# Patient Record
Sex: FEMALE | Race: BLACK OR AFRICAN AMERICAN | Employment: OTHER | URBAN - METROPOLITAN AREA
[De-identification: names, ages, dates, MRNs, and addresses within clinical notes are randomized per-mention and may not be internally consistent; named-entity substitution may affect disease eponyms.]

---

## 2018-01-03 ENCOUNTER — HOSPITAL ENCOUNTER (OUTPATIENT)
Dept: PREADMISSION TESTING | Age: 58
Discharge: HOME | End: 2018-01-03
Payer: MEDICARE

## 2018-01-03 LAB
ANION GAP SERPL CALC-SCNC: 9 MMOL/L (ref 3–18)
APTT PPP: 30.9 SEC (ref 23–36.4)
ATRIAL RATE: 65 BPM
BACTERIA SPEC CULT: NORMAL
BUN SERPL-MCNC: 20 MG/DL (ref 7–18)
BUN/CREAT SERPL: 19 (ref 12–20)
CALCIUM SERPL-MCNC: 9.3 MG/DL (ref 8.5–10.1)
CALCULATED P AXIS, ECG09: 5 DEGREES
CALCULATED R AXIS, ECG10: 11 DEGREES
CALCULATED T AXIS, ECG11: 107 DEGREES
CHLORIDE SERPL-SCNC: 104 MMOL/L (ref 100–108)
CO2 SERPL-SCNC: 28 MMOL/L (ref 21–32)
CREAT SERPL-MCNC: 1.08 MG/DL (ref 0.6–1.3)
DIAGNOSIS, 93000: NORMAL
ERYTHROCYTE [DISTWIDTH] IN BLOOD BY AUTOMATED COUNT: 13.9 % (ref 11.6–14.5)
GLUCOSE SERPL-MCNC: 91 MG/DL (ref 74–99)
HCT VFR BLD AUTO: 37 % (ref 35–45)
HGB BLD-MCNC: 12.1 G/DL (ref 12–16)
INR PPP: 1 (ref 0.8–1.2)
MCH RBC QN AUTO: 32 PG (ref 24–34)
MCHC RBC AUTO-ENTMCNC: 32.7 G/DL (ref 31–37)
MCV RBC AUTO: 97.9 FL (ref 74–97)
P-R INTERVAL, ECG05: 192 MS
PLATELET # BLD AUTO: 310 K/UL (ref 135–420)
PMV BLD AUTO: 9.6 FL (ref 9.2–11.8)
POTASSIUM SERPL-SCNC: 3.9 MMOL/L (ref 3.5–5.5)
PROTHROMBIN TIME: 12.5 SEC (ref 11.5–15.2)
Q-T INTERVAL, ECG07: 426 MS
QRS DURATION, ECG06: 76 MS
QTC CALCULATION (BEZET), ECG08: 443 MS
RBC # BLD AUTO: 3.78 M/UL (ref 4.2–5.3)
SERVICE CMNT-IMP: NORMAL
SODIUM SERPL-SCNC: 141 MMOL/L (ref 136–145)
VENTRICULAR RATE, ECG03: 65 BPM
WBC # BLD AUTO: 7.6 K/UL (ref 4.6–13.2)

## 2018-01-03 PROCEDURE — 85730 THROMBOPLASTIN TIME PARTIAL: CPT | Performed by: ORTHOPAEDIC SURGERY

## 2018-01-03 PROCEDURE — 93005 ELECTROCARDIOGRAM TRACING: CPT

## 2018-01-03 PROCEDURE — 80048 BASIC METABOLIC PNL TOTAL CA: CPT | Performed by: ORTHOPAEDIC SURGERY

## 2018-01-03 PROCEDURE — 87641 MR-STAPH DNA AMP PROBE: CPT | Performed by: ORTHOPAEDIC SURGERY

## 2018-01-03 PROCEDURE — 85610 PROTHROMBIN TIME: CPT | Performed by: ORTHOPAEDIC SURGERY

## 2018-01-03 PROCEDURE — 85027 COMPLETE CBC AUTOMATED: CPT | Performed by: ORTHOPAEDIC SURGERY

## 2018-01-03 RX ORDER — ALBUTEROL SULFATE 0.83 MG/ML
SOLUTION RESPIRATORY (INHALATION)
COMMUNITY

## 2018-01-03 RX ORDER — FLUTICASONE PROPIONATE 50 MCG
2 SPRAY, SUSPENSION (ML) NASAL 2 TIMES DAILY
COMMUNITY

## 2018-01-03 RX ORDER — TRAMADOL HYDROCHLORIDE 50 MG/1
50 TABLET ORAL
COMMUNITY
End: 2020-08-11

## 2018-01-03 RX ORDER — NAPROXEN 500 MG/1
500 TABLET ORAL
COMMUNITY
End: 2020-08-11

## 2018-01-03 RX ORDER — FUROSEMIDE 40 MG/1
40 TABLET ORAL EVERY OTHER DAY
COMMUNITY

## 2018-01-03 RX ORDER — SIMVASTATIN 40 MG/1
40 TABLET, FILM COATED ORAL
COMMUNITY
End: 2019-06-03

## 2018-01-03 RX ORDER — RANITIDINE 150 MG/1
150 TABLET, FILM COATED ORAL 2 TIMES DAILY
COMMUNITY
End: 2020-08-11

## 2018-01-03 RX ORDER — DICLOFENAC POTASSIUM 50 MG/1
50 TABLET, FILM COATED ORAL 3 TIMES DAILY
COMMUNITY
End: 2019-06-03

## 2018-01-03 RX ORDER — FLUCONAZOLE 100 MG/1
100 TABLET ORAL DAILY
COMMUNITY
End: 2019-06-03

## 2018-01-03 RX ORDER — PHENTERMINE HYDROCHLORIDE 37.5 MG/1
37.5 CAPSULE ORAL
COMMUNITY
End: 2018-03-14

## 2018-01-03 RX ORDER — PREGABALIN 300 MG/1
300 CAPSULE ORAL 2 TIMES DAILY
COMMUNITY
End: 2020-08-11

## 2018-01-03 RX ORDER — CEFAZOLIN SODIUM 2 G/50ML
2 SOLUTION INTRAVENOUS ONCE
Status: CANCELLED | OUTPATIENT
Start: 2018-01-03 | End: 2018-01-03

## 2018-01-03 RX ORDER — AMLODIPINE BESYLATE 5 MG/1
5 TABLET ORAL DAILY
COMMUNITY
End: 2019-06-03

## 2018-01-03 RX ORDER — QUETIAPINE FUMARATE 50 MG/1
50 TABLET, FILM COATED ORAL
COMMUNITY
End: 2019-06-03

## 2018-01-03 RX ORDER — SODIUM CHLORIDE, SODIUM LACTATE, POTASSIUM CHLORIDE, CALCIUM CHLORIDE 600; 310; 30; 20 MG/100ML; MG/100ML; MG/100ML; MG/100ML
125 INJECTION, SOLUTION INTRAVENOUS CONTINUOUS
Status: CANCELLED | OUTPATIENT
Start: 2018-01-03

## 2018-01-03 NOTE — PERIOP NOTES
No sleep apnea or previous sleep studies. No history of MH. PCP is aware of surgery. Care fusion instructions reviewed and kit given. Does  meet criteria for special population at this time, OR posting notified. Not participating in clinical trials or research study.

## 2018-03-14 RX ORDER — DICLOFENAC SODIUM 50 MG/1
TABLET, DELAYED RELEASE ORAL 2 TIMES DAILY
COMMUNITY
End: 2019-06-03

## 2018-03-14 RX ORDER — GUAIFENESIN 100 MG/5ML
81 LIQUID (ML) ORAL DAILY
COMMUNITY
End: 2020-08-25

## 2018-03-15 ENCOUNTER — HOSPITAL ENCOUNTER (INPATIENT)
Dept: CARDIAC CATH/INVASIVE PROCEDURES | Age: 58
LOS: 1 days | Discharge: ANOTHER HOSPITAL | DRG: 287 | End: 2018-03-16
Attending: INTERNAL MEDICINE | Admitting: INTERNAL MEDICINE
Payer: MEDICARE

## 2018-03-15 PROBLEM — Z95.1 AORTOCORONARY BYPASS STATUS: Status: ACTIVE | Noted: 2018-03-15

## 2018-03-15 LAB
ANION GAP SERPL CALC-SCNC: 6 MMOL/L (ref 3–18)
BUN SERPL-MCNC: 27 MG/DL (ref 7–18)
BUN/CREAT SERPL: 23 (ref 12–20)
CALCIUM SERPL-MCNC: 9.3 MG/DL (ref 8.5–10.1)
CHLORIDE SERPL-SCNC: 104 MMOL/L (ref 100–108)
CHOLEST SERPL-MCNC: 195 MG/DL
CO2 SERPL-SCNC: 28 MMOL/L (ref 21–32)
CREAT SERPL-MCNC: 1.17 MG/DL (ref 0.6–1.3)
ERYTHROCYTE [DISTWIDTH] IN BLOOD BY AUTOMATED COUNT: 13.8 % (ref 11.6–14.5)
GLUCOSE SERPL-MCNC: 93 MG/DL (ref 74–99)
HCT VFR BLD AUTO: 43.5 % (ref 35–45)
HDLC SERPL-MCNC: 42 MG/DL (ref 40–60)
HDLC SERPL: 4.6 {RATIO} (ref 0–5)
HGB BLD-MCNC: 14.3 G/DL (ref 12–16)
INR PPP: 0.9 (ref 0.8–1.2)
LDLC SERPL CALC-MCNC: 128.6 MG/DL (ref 0–100)
LIPID PROFILE,FLP: ABNORMAL
MAGNESIUM SERPL-MCNC: 1.9 MG/DL (ref 1.6–2.6)
MCH RBC QN AUTO: 32.1 PG (ref 24–34)
MCHC RBC AUTO-ENTMCNC: 32.9 G/DL (ref 31–37)
MCV RBC AUTO: 97.5 FL (ref 74–97)
PLATELET # BLD AUTO: 281 K/UL (ref 135–420)
PMV BLD AUTO: 9.2 FL (ref 9.2–11.8)
POTASSIUM SERPL-SCNC: 4.2 MMOL/L (ref 3.5–5.5)
PROTHROMBIN TIME: 12 SEC (ref 11.5–15.2)
RBC # BLD AUTO: 4.46 M/UL (ref 4.2–5.3)
SODIUM SERPL-SCNC: 138 MMOL/L (ref 136–145)
TRIGL SERPL-MCNC: 122 MG/DL (ref ?–150)
VLDLC SERPL CALC-MCNC: 24.4 MG/DL
WBC # BLD AUTO: 5.7 K/UL (ref 4.6–13.2)

## 2018-03-15 PROCEDURE — 74011250637 HC RX REV CODE- 250/637: Performed by: INTERNAL MEDICINE

## 2018-03-15 PROCEDURE — 74011000250 HC RX REV CODE- 250

## 2018-03-15 PROCEDURE — B2111ZZ FLUOROSCOPY OF MULTIPLE CORONARY ARTERIES USING LOW OSMOLAR CONTRAST: ICD-10-PCS | Performed by: INTERNAL MEDICINE

## 2018-03-15 PROCEDURE — 74011636320 HC RX REV CODE- 636/320: Performed by: INTERNAL MEDICINE

## 2018-03-15 PROCEDURE — 80061 LIPID PANEL: CPT | Performed by: INTERNAL MEDICINE

## 2018-03-15 PROCEDURE — 85610 PROTHROMBIN TIME: CPT | Performed by: INTERNAL MEDICINE

## 2018-03-15 PROCEDURE — 80048 BASIC METABOLIC PNL TOTAL CA: CPT | Performed by: INTERNAL MEDICINE

## 2018-03-15 PROCEDURE — 77030010221 CARDIAC CATHETERIZATION

## 2018-03-15 PROCEDURE — 4A023N8 MEASUREMENT OF CARDIAC SAMPLING AND PRESSURE, BILATERAL, PERCUTANEOUS APPROACH: ICD-10-PCS | Performed by: INTERNAL MEDICINE

## 2018-03-15 PROCEDURE — 65660000000 HC RM CCU STEPDOWN

## 2018-03-15 PROCEDURE — 74011000250 HC RX REV CODE- 250: Performed by: INTERNAL MEDICINE

## 2018-03-15 PROCEDURE — 83735 ASSAY OF MAGNESIUM: CPT | Performed by: INTERNAL MEDICINE

## 2018-03-15 PROCEDURE — B2151ZZ FLUOROSCOPY OF LEFT HEART USING LOW OSMOLAR CONTRAST: ICD-10-PCS | Performed by: INTERNAL MEDICINE

## 2018-03-15 PROCEDURE — 74011250636 HC RX REV CODE- 250/636

## 2018-03-15 PROCEDURE — 74011250636 HC RX REV CODE- 250/636: Performed by: INTERNAL MEDICINE

## 2018-03-15 PROCEDURE — 85027 COMPLETE CBC AUTOMATED: CPT | Performed by: INTERNAL MEDICINE

## 2018-03-15 PROCEDURE — 36415 COLL VENOUS BLD VENIPUNCTURE: CPT | Performed by: INTERNAL MEDICINE

## 2018-03-15 RX ORDER — CLONAZEPAM 0.5 MG/1
0.5 TABLET ORAL 3 TIMES DAILY
Status: DISCONTINUED | OUTPATIENT
Start: 2018-03-15 | End: 2018-03-16 | Stop reason: HOSPADM

## 2018-03-15 RX ORDER — RANITIDINE 150 MG/1
150 TABLET, FILM COATED ORAL 2 TIMES DAILY
Status: DISCONTINUED | OUTPATIENT
Start: 2018-03-15 | End: 2018-03-16 | Stop reason: HOSPADM

## 2018-03-15 RX ORDER — LOSARTAN POTASSIUM 50 MG/1
100 TABLET ORAL DAILY
Status: DISCONTINUED | OUTPATIENT
Start: 2018-03-16 | End: 2018-03-16 | Stop reason: HOSPADM

## 2018-03-15 RX ORDER — OXYCODONE HYDROCHLORIDE 10 MG/1
TABLET ORAL
COMMUNITY
End: 2020-08-11

## 2018-03-15 RX ORDER — FENTANYL CITRATE 50 UG/ML
INJECTION, SOLUTION INTRAMUSCULAR; INTRAVENOUS
Status: COMPLETED
Start: 2018-03-15 | End: 2018-03-15

## 2018-03-15 RX ORDER — SODIUM CHLORIDE 0.9 % (FLUSH) 0.9 %
5-10 SYRINGE (ML) INJECTION EVERY 8 HOURS
Status: DISCONTINUED | OUTPATIENT
Start: 2018-03-15 | End: 2018-03-16 | Stop reason: HOSPADM

## 2018-03-15 RX ORDER — LIDOCAINE HYDROCHLORIDE 10 MG/ML
INJECTION, SOLUTION EPIDURAL; INFILTRATION; INTRACAUDAL; PERINEURAL
Status: COMPLETED
Start: 2018-03-15 | End: 2018-03-15

## 2018-03-15 RX ORDER — ADENOSINE 3 MG/ML
140 INJECTION, SOLUTION INTRAVENOUS
Status: DISCONTINUED | OUTPATIENT
Start: 2018-03-15 | End: 2018-03-15 | Stop reason: HOSPADM

## 2018-03-15 RX ORDER — LIDOCAINE HYDROCHLORIDE 10 MG/ML
3-20 INJECTION INFILTRATION; PERINEURAL
Status: DISCONTINUED | OUTPATIENT
Start: 2018-03-15 | End: 2018-03-15 | Stop reason: HOSPADM

## 2018-03-15 RX ORDER — LORAZEPAM 1 MG/1
.5-1 TABLET ORAL
Status: DISCONTINUED | OUTPATIENT
Start: 2018-03-15 | End: 2018-03-16 | Stop reason: HOSPADM

## 2018-03-15 RX ORDER — SODIUM CHLORIDE 9 MG/ML
50 INJECTION, SOLUTION INTRAVENOUS CONTINUOUS
Status: DISCONTINUED | OUTPATIENT
Start: 2018-03-15 | End: 2018-03-16

## 2018-03-15 RX ORDER — FLUTICASONE PROPIONATE 50 MCG
2 SPRAY, SUSPENSION (ML) NASAL 2 TIMES DAILY
Status: DISCONTINUED | OUTPATIENT
Start: 2018-03-15 | End: 2018-03-16 | Stop reason: HOSPADM

## 2018-03-15 RX ORDER — VERAPAMIL HYDROCHLORIDE 2.5 MG/ML
INJECTION, SOLUTION INTRAVENOUS
Status: DISPENSED
Start: 2018-03-15 | End: 2018-03-15

## 2018-03-15 RX ORDER — ARIPIPRAZOLE 2 MG/1
2 TABLET ORAL
Status: DISCONTINUED | OUTPATIENT
Start: 2018-03-15 | End: 2018-03-16 | Stop reason: HOSPADM

## 2018-03-15 RX ORDER — GUAIFENESIN 100 MG/5ML
81 LIQUID (ML) ORAL DAILY
Status: COMPLETED | OUTPATIENT
Start: 2018-03-15 | End: 2018-03-15

## 2018-03-15 RX ORDER — MIDAZOLAM HYDROCHLORIDE 1 MG/ML
INJECTION, SOLUTION INTRAMUSCULAR; INTRAVENOUS
Status: DISPENSED
Start: 2018-03-15 | End: 2018-03-15

## 2018-03-15 RX ORDER — AMLODIPINE BESYLATE 5 MG/1
5 TABLET ORAL DAILY
Status: DISCONTINUED | OUTPATIENT
Start: 2018-03-16 | End: 2018-03-16 | Stop reason: HOSPADM

## 2018-03-15 RX ORDER — ALBUTEROL SULFATE 0.83 MG/ML
2.5 SOLUTION RESPIRATORY (INHALATION)
Status: DISCONTINUED | OUTPATIENT
Start: 2018-03-15 | End: 2018-03-16 | Stop reason: HOSPADM

## 2018-03-15 RX ORDER — LIDOCAINE HYDROCHLORIDE 10 MG/ML
3-20 INJECTION, SOLUTION EPIDURAL; INFILTRATION; INTRACAUDAL; PERINEURAL ONCE
Status: COMPLETED | OUTPATIENT
Start: 2018-03-15 | End: 2018-03-15

## 2018-03-15 RX ORDER — PHENTERMINE HYDROCHLORIDE 37.5 MG/1
37.5 CAPSULE ORAL 2 TIMES DAILY
COMMUNITY
End: 2019-06-03

## 2018-03-15 RX ORDER — HEPARIN SODIUM 200 [USP'U]/100ML
INJECTION, SOLUTION INTRAVENOUS
Status: COMPLETED
Start: 2018-03-15 | End: 2018-03-15

## 2018-03-15 RX ORDER — SODIUM CHLORIDE 900 MG/100ML
INJECTION INTRAVENOUS
Status: DISPENSED
Start: 2018-03-15 | End: 2018-03-15

## 2018-03-15 RX ORDER — METOPROLOL TARTRATE 50 MG/1
50 TABLET ORAL 2 TIMES DAILY
Status: DISCONTINUED | OUTPATIENT
Start: 2018-03-15 | End: 2018-03-16 | Stop reason: HOSPADM

## 2018-03-15 RX ORDER — FUROSEMIDE 40 MG/1
40 TABLET ORAL DAILY
Status: DISCONTINUED | OUTPATIENT
Start: 2018-03-15 | End: 2018-03-16 | Stop reason: HOSPADM

## 2018-03-15 RX ORDER — SIMVASTATIN 40 MG/1
40 TABLET, FILM COATED ORAL
Status: DISCONTINUED | OUTPATIENT
Start: 2018-03-15 | End: 2018-03-16 | Stop reason: HOSPADM

## 2018-03-15 RX ORDER — GUAIFENESIN 100 MG/5ML
81 LIQUID (ML) ORAL DAILY
Status: DISCONTINUED | OUTPATIENT
Start: 2018-03-15 | End: 2018-03-16 | Stop reason: HOSPADM

## 2018-03-15 RX ORDER — QUETIAPINE FUMARATE 25 MG/1
50 TABLET, FILM COATED ORAL
Status: DISCONTINUED | OUTPATIENT
Start: 2018-03-15 | End: 2018-03-16 | Stop reason: HOSPADM

## 2018-03-15 RX ORDER — NITROGLYCERIN 5 MG/ML
INJECTION, SOLUTION INTRAVENOUS
Status: DISPENSED
Start: 2018-03-15 | End: 2018-03-15

## 2018-03-15 RX ORDER — SODIUM CHLORIDE 0.9 % (FLUSH) 0.9 %
5-10 SYRINGE (ML) INJECTION AS NEEDED
Status: DISCONTINUED | OUTPATIENT
Start: 2018-03-15 | End: 2018-03-16 | Stop reason: HOSPADM

## 2018-03-15 RX ORDER — HEPARIN SODIUM 1000 [USP'U]/ML
INJECTION, SOLUTION INTRAVENOUS; SUBCUTANEOUS
Status: DISPENSED
Start: 2018-03-15 | End: 2018-03-15

## 2018-03-15 RX ORDER — FENTANYL CITRATE 50 UG/ML
25-100 INJECTION, SOLUTION INTRAMUSCULAR; INTRAVENOUS
Status: DISCONTINUED | OUTPATIENT
Start: 2018-03-15 | End: 2018-03-15 | Stop reason: HOSPADM

## 2018-03-15 RX ORDER — MIDAZOLAM HYDROCHLORIDE 1 MG/ML
.5-2 INJECTION, SOLUTION INTRAMUSCULAR; INTRAVENOUS
Status: DISCONTINUED | OUTPATIENT
Start: 2018-03-15 | End: 2018-03-15 | Stop reason: HOSPADM

## 2018-03-15 RX ORDER — BIVALIRUDIN 250 MG/5ML
INJECTION, POWDER, LYOPHILIZED, FOR SOLUTION INTRAVENOUS
Status: DISPENSED
Start: 2018-03-15 | End: 2018-03-15

## 2018-03-15 RX ORDER — PREGABALIN 100 MG/1
300 CAPSULE ORAL 2 TIMES DAILY
Status: DISCONTINUED | OUTPATIENT
Start: 2018-03-15 | End: 2018-03-16 | Stop reason: HOSPADM

## 2018-03-15 RX ORDER — GABAPENTIN 300 MG/1
300 CAPSULE ORAL 2 TIMES DAILY
Status: DISCONTINUED | OUTPATIENT
Start: 2018-03-15 | End: 2018-03-16 | Stop reason: HOSPADM

## 2018-03-15 RX ORDER — HEPARIN SODIUM 1000 [USP'U]/ML
4000 INJECTION, SOLUTION INTRAVENOUS; SUBCUTANEOUS ONCE
Status: COMPLETED | OUTPATIENT
Start: 2018-03-15 | End: 2018-03-15

## 2018-03-15 RX ORDER — HEPARIN SODIUM 200 [USP'U]/100ML
500 INJECTION, SOLUTION INTRAVENOUS
Status: DISCONTINUED | OUTPATIENT
Start: 2018-03-15 | End: 2018-03-15 | Stop reason: HOSPADM

## 2018-03-15 RX ADMIN — QUETIAPINE FUMARATE 50 MG: 25 TABLET ORAL at 22:13

## 2018-03-15 RX ADMIN — FENTANYL CITRATE 50 MCG: 50 INJECTION, SOLUTION INTRAMUSCULAR; INTRAVENOUS at 10:19

## 2018-03-15 RX ADMIN — LAMOTRIGINE 150 MG: 100 TABLET ORAL at 23:35

## 2018-03-15 RX ADMIN — SODIUM CHLORIDE 50 ML/HR: 900 INJECTION, SOLUTION INTRAVENOUS at 17:30

## 2018-03-15 RX ADMIN — SODIUM CHLORIDE 50 ML/HR: 900 INJECTION, SOLUTION INTRAVENOUS at 08:28

## 2018-03-15 RX ADMIN — SIMVASTATIN 40 MG: 40 TABLET, FILM COATED ORAL at 22:13

## 2018-03-15 RX ADMIN — GABAPENTIN 300 MG: 300 CAPSULE ORAL at 22:13

## 2018-03-15 RX ADMIN — MIDAZOLAM HYDROCHLORIDE 1 MG: 1 INJECTION, SOLUTION INTRAMUSCULAR; INTRAVENOUS at 10:38

## 2018-03-15 RX ADMIN — VERAPAMIL HYDROCHLORIDE 3 ML: 2.5 INJECTION INTRAVENOUS at 10:09

## 2018-03-15 RX ADMIN — LIDOCAINE HYDROCHLORIDE 3 ML: 10 INJECTION, SOLUTION EPIDURAL; INFILTRATION; INTRACAUDAL; PERINEURAL at 09:58

## 2018-03-15 RX ADMIN — LIDOCAINE HYDROCHLORIDE 3 ML: 10 INJECTION, SOLUTION EPIDURAL; INFILTRATION; INTRACAUDAL; PERINEURAL at 09:59

## 2018-03-15 RX ADMIN — HEPARIN SODIUM 4000 UNITS: 1000 INJECTION, SOLUTION INTRAVENOUS; SUBCUTANEOUS at 10:11

## 2018-03-15 RX ADMIN — FENTANYL CITRATE 100 MCG: 50 INJECTION, SOLUTION INTRAMUSCULAR; INTRAVENOUS at 09:58

## 2018-03-15 RX ADMIN — Medication 1000 UNITS: at 10:22

## 2018-03-15 RX ADMIN — MIDAZOLAM HYDROCHLORIDE 1 MG: 1 INJECTION, SOLUTION INTRAMUSCULAR; INTRAVENOUS at 10:20

## 2018-03-15 RX ADMIN — MIDAZOLAM HYDROCHLORIDE 2 MG: 1 INJECTION, SOLUTION INTRAMUSCULAR; INTRAVENOUS at 09:58

## 2018-03-15 RX ADMIN — METOPROLOL TARTRATE 50 MG: 50 TABLET ORAL at 22:13

## 2018-03-15 RX ADMIN — ASPIRIN 81 MG 81 MG: 81 TABLET ORAL at 08:27

## 2018-03-15 RX ADMIN — FLUTICASONE PROPIONATE 2 SPRAY: 50 SPRAY, METERED NASAL at 22:12

## 2018-03-15 RX ADMIN — Medication 1000 UNITS: at 09:58

## 2018-03-15 RX ADMIN — PREGABALIN 300 MG: 100 CAPSULE ORAL at 22:13

## 2018-03-15 RX ADMIN — CLONAZEPAM 0.5 MG: 0.5 TABLET ORAL at 22:13

## 2018-03-15 RX ADMIN — CLONAZEPAM 0.5 MG: 0.5 TABLET ORAL at 18:03

## 2018-03-15 RX ADMIN — ARIPIPRAZOLE 2 MG: 2 TABLET ORAL at 22:13

## 2018-03-15 RX ADMIN — IOPAMIDOL 85 ML: 510 INJECTION, SOLUTION INTRAVASCULAR at 10:38

## 2018-03-15 RX ADMIN — FENTANYL CITRATE 50 MCG: 50 INJECTION, SOLUTION INTRAMUSCULAR; INTRAVENOUS at 10:38

## 2018-03-15 RX ADMIN — RANITIDINE 150 MG: 150 TABLET ORAL at 22:12

## 2018-03-15 NOTE — PROGRESS NOTES
Venous sheath from right anticub area removed, easton pressure to site, covered with 2x2 and tegaderm  Tr band removed, no drainage noted from site, 2x2 and tegaderm applied, neuro/vasc assessment of right wirist and hand WNL

## 2018-03-15 NOTE — ROUTINE PROCESS
Cardiac Cath Lab:  Pre Procedure Chart Check     Patients chart was accessed and reviewed for possible and/or scheduled procedure. Creatinine Clearance:  Creatinine clearance cannot be calculated (Unknown ideal weight.)    Total Contrast  Load:  3 x estimated clearance amount=  ml    75% of Contrast Load:  0.75 x Total Contrast Load=    ml    No results for input(s): WBC, RBC, HCT, HGB, PLT, INR, APTT, PTP, NA, K, BUN, CREA, GFRAA, GFRNA, CA, MAG, CPK, CKMB, CKNDX, CKND1, TROPT, TROIQ, BNPP, BNP, HCTEXT, HGBEXT, PLTEXT in the last 72 hours. No lab exists for component: DDIMER, GLUCOSE, INREXT    BMI: There is no height or weight on file to calculate BMI.     ALLERGIES:   Allergies   Allergen Reactions    Hydrocodone Nausea and Vomiting       Lines:                History:    Past Medical History:   Diagnosis Date    Anemia     Arthritis     back    Asthma     life long    Autoimmune disease ([facility] 2013    Fibromyalgia    Bipolar 1 disorder, depressed ([facility]     Bronchitis     Chronic obstructive pulmonary disease ([facility]     2017    Chronic pain     back, all over    Depression     HTN (hypertension), benign     30 years    Hypertension     Liver disease     states had hepatitis and was treated after childbirth and a bloof transfusion 20 years ago    PTSD (post-traumatic stress disorder)      Past Surgical History:   Procedure Laterality Date    ABDOMEN SURGERY PROC UNLISTED      [facility]    BREAST SURGERY PROCEDURE UNLISTED      reduction    DELIVERY       ENDOMETRIAL ABLATION, THERMAL      GASTRIC BYPASS,OBESITY,W/SM BOWEL RECONS      HX APPENDECTOMY      years ago     [address]    HX CHOLECYSTECTOMY      years ago    HX GASTRIC BYPASS  2007    HX HERNIA REPAIR  2008    HX HYSTERECTOMY      years ago    HX LUMBAR DISKECTOMY  2006     Patient Active Problem List   Diagnosis Code    HTN (hypertension), benign I10    Depression F32.9    Asthma J45.909    Bipolar 1 disorder, depressed (HCC) F31.9    PTSD (post-traumatic stress disorder) F43.10    Back pain M54.9

## 2018-03-15 NOTE — ROUTINE PROCESS
1300 TRANSFER - OUT REPORT:    Verbal report given to Adrienne PEOPLES(name) on Aziza Sterling  being transferred to [facility] Cardiac (unit) for routine progression of care       Report consisted of patients Situation, Background, Assessment and   Recommendations(SBAR). Information from the following report(s) SBAR, Kardex, Procedure Summary, Intake/Output, MAR, Recent Results, Med Rec Status and Cardiac Rhythm SR was reviewed with the receiving nurse. Lines:   Peripheral IV 03/15/18 Left Antecubital (Active)   Site Assessment Clean, dry, & intact 3/15/2018  8:34 AM   Phlebitis Assessment 0 3/15/2018  8:34 AM   Infiltration Assessment 0 3/15/2018  8:34 AM   Dressing Status Clean, dry, & intact 3/15/2018  8:34 AM   Dressing Type Tape;Transparent 3/15/2018  8:34 AM   Hub Color/Line Status Pink;Flushed;Capped; Infusing 3/15/2018  8:34 AM   Action Taken Open ports on tubing capped 3/15/2018  8:34 AM   Alcohol Cap Used Yes 3/15/2018  8:34 AM        Opportunity for questions and clarification was provided.       Patient transported with:   Monitor

## 2018-03-15 NOTE — PROGRESS NOTES
Pt is all prepped and ready for procedure. 1045 Pt back to unit S/P cardiac cath. Rt wrist and rt brachial accessed. TR band to wrist and venous sheat in place to rt brachial. Pt very sleepy. Sites wnl.     1230 Pt awake and alert, up to bathroom and voided well  [id number] now eating lunch  Spoke with Ynes Castellanos from transfer center, transfer work  Initiated, face sheet faxed. 1300 Report called to tele    1350 Pt transfer to [address] [place] [address] , bed 345 in stable condition.

## 2018-03-15 NOTE — PROGRESS NOTES
Cardiology Progress Note        Patient: Ernestina Ferrell        Sex: female          DOA: 3/15/2018  YOB: 1960      Age:  63 y.o.        LOS:  LOS: 0 days   Assessment/Plan     Discussed with transfer center, they want me to discuss with ER physician for transfer to [facility]. Discussed with patient and son they requested transfer to [facility]  for CABG. Will initiate process of transfer to [facility].   THX      Signed By: Noé Kaur MD     March 15, 2018

## 2018-03-15 NOTE — ROUTINE PROCESS
-1400 Assumed pt care. Received verbal report from [address], Cath lab. No s/s of distress noted. Pt to be transferred to [facility] for a CABG. Waiting on call from [facility] for room assignment. - Dr Carlito Mckenzie stated pt will be transferred to [facility] tomorrow. Bedside and Verbal shift change report given to Jose Daniel High RN (oncoming nurse) by Sade Harvey RN (offgoing nurse). Report included the following information SBAR, Kardex, MAR and Accordion. Alarm parameters reviewed, on and audible Appropriate for patient clinical condition. Alarm parameters reviewed and opportunities for questions provided.

## 2018-03-15 NOTE — PROGRESS NOTES
TRANSFER - OUT REPORT:  Verbal report given to RICHELLE Granados(name) on Brenton Sevilla being transferred to Care(unit) for routine progression of care   Report consisted of patients Situation, Background, Assessment and   Recommendations(SBAR). Information from the following report(s) SBAR, Kardex, Procedure Summary, MAR, Recent Results and Cardiac Rhythm NSR 69bpm was reviewed with the receiving nurse. Cath Lab Report:    Procedure:  [x ] LHC  [x ] RHC  [ ] PTCA   [ ] Peripheral   [ ] Pacemaker [ ] TORI  [ ] [facility]    Access site:   [x ] Right [ ] Left  [ ] Radial [x ] Brachial [ ] Femoral [ ] Jugular [ ] Chest Wall      Sheath:           [ x] Pulled in Cath Lab   [x ] In place in RBV   [x ] To be pulled after: 1 hour after heparin @1111         Closure:          [ x] Radial Band  [ ] Manual Pressure     [ ] Radha Reyes     [ ] Star Close    [ ] Per Close    [ ] Safe Guard    Site Assessment:   [x ] Clean, Dry, No bleeding    [ ] Minor oozing          [ ] Hematoma: Description:    Stents(s) Placement:  [ ] Left Main:                 [ ] LAD:                [ ] Circ:                [ ] RCA:                [ ] EF:     [ ] Peripheral:      [ ] N/A    Infusion [ ]Angiomax [ ] Integrelin [ ] Heparin d/c'd: Intra procedure Medications:    Fentanyl:200mcg IV  Versed:4mg IV  Heparin:4000Units @ 1011  Antiplatelet:  Other:    Lines:        Peripheral IV 03/15/18 Left Antecubital (Active)   Site Assessment Clean, dry, & intact 3/15/2018  8:34 AM   Phlebitis Assessment 0 3/15/2018  8:34 AM   Infiltration Assessment 0 3/15/2018  8:34 AM   Dressing Status Clean, dry, & intact 3/15/2018  8:34 AM   Dressing Type Tape;Transparent 3/15/2018  8:34 AM   Hub Color/Line Status Pink;Flushed;Capped; Infusing 3/15/2018  8:34 AM   Action Taken Open ports on tubing capped 3/15/2018  8:34 AM   Alcohol Cap Used Yes 3/15/2018  8:34 AM                                         Sheath 03/15/18 (Active)   Site Assessment Clean, dry, & intact 3/15/2018 10:31 AM   Dressing Status Clean, dry, & intact 3/15/2018 10:31 AM   Dressing Type Transparent 3/15/2018 10:31 AM   Hub Color/Line Status Green 3/15/2018 10:31 AM   Hemostasis  Dressing applied during procedure 3/15/2018 10:31 AM       Patient Vitals for the past 4 hrs:   Temp Pulse Resp BP SpO2   03/15/18 1031 97.8 °F (36.6 °C) 69 13 (!) 150/97 96 %   03/15/18 0830 99 °F (37.2 °C) - 20 105/68 95 %         Extended / Orthostatic Vitals:    Vital Signs  Level of Consciousness: Alert (03/15/18 1031)  Temp: 97.8 °F (36.6 °C) (03/15/18 1031)  Temp Source: Oral (03/15/18 1031)  Pulse (Heart Rate): 69 (03/15/18 1031)  Heart Rate Source: Monitor (03/15/18 1031)  Cardiac Rhythm: Normal sinus rhythm (03/15/18 1031)  Resp Rate: 13 (03/15/18 1031)  BP: (!) 150/97 (03/15/18 1031)  MAP (Calculated): 115 (03/15/18 1031)  BP 1 Location: Left arm (03/15/18 1031)  BP 1 Method: Automatic (03/15/18 1031)  BP Patient Position: At rest;Supine (03/15/18 1031)  MEWS Score: 0 (03/15/18 1031)         Oxygen Therapy  O2 Sat (%): 96 % (03/15/18 1031)  O2 Device: Room air (03/15/18 0830)          Opportunity for questions and clarification was provided.

## 2018-03-16 VITALS
SYSTOLIC BLOOD PRESSURE: 138 MMHG | RESPIRATION RATE: 16 BRPM | DIASTOLIC BLOOD PRESSURE: 72 MMHG | WEIGHT: 227.07 LBS | OXYGEN SATURATION: 98 % | BODY MASS INDEX: 38.77 KG/M2 | HEART RATE: 70 BPM | HEIGHT: 64 IN | TEMPERATURE: 97.7 F

## 2018-03-16 PROBLEM — I10 ESSENTIAL HYPERTENSION: Status: ACTIVE | Noted: 2018-03-16

## 2018-03-16 PROBLEM — I25.10 3-VESSEL CAD: Status: ACTIVE | Noted: 2018-03-15

## 2018-03-16 PROBLEM — E66.01 MORBID OBESITY (HCC): Status: ACTIVE | Noted: 2018-03-15

## 2018-03-16 PROBLEM — E78.5 HYPERLIPIDEMIA: Status: ACTIVE | Noted: 2018-03-15

## 2018-03-16 LAB
APTT PPP: 28.1 SEC (ref 23–36.4)
BASOPHILS # BLD: 0 K/UL (ref 0–0.06)
BASOPHILS NFR BLD: 0 % (ref 0–2)
DIFFERENTIAL METHOD BLD: ABNORMAL
EOSINOPHIL # BLD: 0.2 K/UL (ref 0–0.4)
EOSINOPHIL NFR BLD: 4 % (ref 0–5)
ERYTHROCYTE [DISTWIDTH] IN BLOOD BY AUTOMATED COUNT: 14 % (ref 11.6–14.5)
HCT VFR BLD AUTO: 41.6 % (ref 35–45)
HGB BLD-MCNC: 13.4 G/DL (ref 12–16)
LYMPHOCYTES # BLD: 1.8 K/UL (ref 0.9–3.6)
LYMPHOCYTES NFR BLD: 33 % (ref 21–52)
MCH RBC QN AUTO: 31.6 PG (ref 24–34)
MCHC RBC AUTO-ENTMCNC: 32.2 G/DL (ref 31–37)
MCV RBC AUTO: 98.1 FL (ref 74–97)
MONOCYTES # BLD: 0.4 K/UL (ref 0.05–1.2)
MONOCYTES NFR BLD: 8 % (ref 3–10)
NEUTS SEG # BLD: 3.1 K/UL (ref 1.8–8)
NEUTS SEG NFR BLD: 55 % (ref 40–73)
PLATELET # BLD AUTO: 287 K/UL (ref 135–420)
PMV BLD AUTO: 9.3 FL (ref 9.2–11.8)
RBC # BLD AUTO: 4.24 M/UL (ref 4.2–5.3)
WBC # BLD AUTO: 5.6 K/UL (ref 4.6–13.2)

## 2018-03-16 PROCEDURE — 36415 COLL VENOUS BLD VENIPUNCTURE: CPT | Performed by: INTERNAL MEDICINE

## 2018-03-16 PROCEDURE — 74011000250 HC RX REV CODE- 250: Performed by: INTERNAL MEDICINE

## 2018-03-16 PROCEDURE — 74011250637 HC RX REV CODE- 250/637: Performed by: INTERNAL MEDICINE

## 2018-03-16 PROCEDURE — 85025 COMPLETE CBC W/AUTO DIFF WBC: CPT | Performed by: INTERNAL MEDICINE

## 2018-03-16 PROCEDURE — 85730 THROMBOPLASTIN TIME PARTIAL: CPT | Performed by: INTERNAL MEDICINE

## 2018-03-16 PROCEDURE — 74011250636 HC RX REV CODE- 250/636: Performed by: INTERNAL MEDICINE

## 2018-03-16 RX ORDER — HEPARIN SODIUM 10000 [USP'U]/100ML
9.8-25 INJECTION, SOLUTION INTRAVENOUS
Status: DISCONTINUED | OUTPATIENT
Start: 2018-03-16 | End: 2018-03-16 | Stop reason: HOSPADM

## 2018-03-16 RX ADMIN — ASPIRIN 81 MG 81 MG: 81 TABLET ORAL at 10:46

## 2018-03-16 RX ADMIN — LAMOTRIGINE 150 MG: 100 TABLET ORAL at 10:46

## 2018-03-16 RX ADMIN — RANITIDINE 150 MG: 150 TABLET ORAL at 10:46

## 2018-03-16 RX ADMIN — HEPARIN SODIUM AND DEXTROSE 9.8 UNITS/KG/HR: 10000; 5 INJECTION INTRAVENOUS at 15:42

## 2018-03-16 RX ADMIN — FUROSEMIDE 40 MG: 40 TABLET ORAL at 10:46

## 2018-03-16 RX ADMIN — METOPROLOL TARTRATE 50 MG: 50 TABLET ORAL at 10:52

## 2018-03-16 RX ADMIN — UMECLIDINIUM BROMIDE AND VILANTEROL TRIFENATATE 1 PUFF: 62.5; 25 POWDER RESPIRATORY (INHALATION) at 10:51

## 2018-03-16 RX ADMIN — FLUTICASONE PROPIONATE 2 SPRAY: 50 SPRAY, METERED NASAL at 10:51

## 2018-03-16 RX ADMIN — Medication 10 ML: at 15:36

## 2018-03-16 RX ADMIN — AMLODIPINE BESYLATE 5 MG: 5 TABLET ORAL at 10:46

## 2018-03-16 RX ADMIN — LOSARTAN POTASSIUM 100 MG: 50 TABLET ORAL at 10:46

## 2018-03-16 NOTE — PROCEDURES
[address] [facility] [facility]    Name:Filomena POLLACK.  MR#: [id number]  : 1960  ACCOUNT #: [de-identified]   DATE OF SERVICE: 03/15/2018    REASON FOR PROCEDURE:  Preoperative abnormal cardiac stress test, with a history of shortness of breath and abnormal EKG. PROCEDURES PERFORMED:  1. Left heart catheterization. 2.  Right heart catheterization. 3.  Selective coronary angiogram.    BLOOD LOSS:  Less than 50 mL. SPECIMENS REMOVED:  None. COUNSELING:  Risks, benefits and alternatives were discussed in detail with the patient and family including mother and son. They all agreed to proceed. PROCEDURE AND TECHNIQUE:  The patient was brought to the cardiac catheterization lab in a fasting and nonsedated state. The patient was prepped and draped in the usual sterilized fashion. The right radial area was anesthetized with local anesthetic and then the right antecubital vein area was also anesthetized with local anesthetic. A 6-Australian sheath was placed under fluoroscopic guidance in the right antecubital vein. Then right radial artery access was also achieved via modified Seldinger technique under fluoroscopic guidance without any complication. The patient was given a radial cocktail and then the Creston catheter was advanced under fluoroscopic guidance. Right-sided pressures were measured and right-sided cardiac output and cardiac index were also measured. The patient remained hemodynamically stable after completing the right heart catheterization. Then a 5-Australian JR4 catheter was used to perform the left heart catheterization and a thin 5-Australian JR4 catheter was used to perform the selective angiography of the right pulmonary artery. Then a 5-Australian JL3 diagnostic catheter was used to perform the selective angiography of the left coronary system. Multiple angiographic views were acquired. The patient remained hemodynamically stable.     Procedure was completed without any complication. The patient remained hemodynamically stable. FINDINGS:  CORONARY ANATOMY:  1. Left main:  This is a right dominant coronary anatomy. LVEDP is 43 mmHg. 2.  Left main artery is a very large vessel in its ostium and body, distally bifurcated into LAD and left circumflex artery, distal left main has probably 30-40% disease on cranial views. 3.  LAD:  LAD is a large vessel in its ostium and in the proximal area, and then it gives rise to a large diagonal branch which has ostial 70-80% lesion, and then in the mid area the LAD is a subtotally occluded very small vessel. Distal LAD is filling with antegrade filling with a small-caliber vessel bridging to the apex. 4.  Left circumflex artery is a large vessel in its ostium and proximal area, gives rise to a small OM1. The moderate-size OM2 has a 70% lesion, and distal left circumflex artery also has a 70% lesion in the distal area, which further divides into superior and inferior branches distally. 5.  Right coronary artery is a moderate-size vessel, has 30-40% proximal lesion. In the mid RCA there is an 80-90% lesion, and very distal RCA also has 80-90% disease. Distal RCA is patent, which bifurcates into small PLV and PDA branches with minimal luminal irregularity. HEMODYNAMICS:    1. Aortic saturation was 88.3% on room air and PA saturation was 60.60% on room air. 2.  Pulmonary capillary wedge pressure mean pressure was 27 mmHg. A wave was 28 mmHg, V wave was 30 mmHg. 3.  PA pressure:  PA was 44/27 mmHg, mean pressure was 31 mmHg. 4.  RV:  54/13. 5.  RA mean pressure was 15 mmHg, A wave was 19 mmHg and V wave was 16 mmHg. 6.  LV was 162/21 with mean LVEDP of 43 mmHg. Aortic pressure was 166/92 mmHg. 7.  PVR was 0.91 Hidalgo unit. 8.  Thermodilution cardiac output and cardiac index was 4.65 liters per minute and 2.25 L per minute per square meter.   9.  Sheng cardiac output and cardiac index was 4.41 and 2.13 L per minute per square meter.    FINAL IMPRESSION:  1.  Multivessel coronary artery disease. 2.  Distal left main has 30-40% disease on cranial view. 3.  Mid-left anterior descending is subtotally occluded and long segment of subtotally occluded vessel. 3.  Moderate to large diagonal branch has ostial 70-80% disease. 4.  Mid-distal left circumflex artery has 70% disease. 5.  Large obtuse marginal 2 branch has 70% proximal disease. 6.  Mid-right coronary artery has 80-90% tight lesions, 2 of them. 7.  Right-sided elevated pressure. No evidence of any significant pulmonary hypertension. RECOMMENDATION:  Multivessel bypass surgery. We will discuss with the family. Depending upon the family's wishes, we will transfer the patient to the hospital , where the patient will undergo bypass surgery. The patient will be admitted in the hospital, will be started on heparin. Discussed with the patient's son and mother.       Rosa Ivey MD MA / GILMA  D: 03/15/2018 20:04     T: 03/16/2018 04:17  JOB #: [id number]

## 2018-03-16 NOTE — PROGRESS NOTES
Cm was asked by Dr. Ronnie Valle to get intouch with cardiac surgeon at [facility] patient has mulitvessel disease ad needs a cabg. Spoke with transfer center [phone] they will have thoracic cardiac surgeon call him back [phone]. Also provided her with Dr. Erika Dupree pager number    Met with patiet at bedside states she understands the purpose for her transfer. Patient is agreeable. Patient states shelives with her henry nd will call him and let him know she is being transferred. Telephone call from [place]amps from  Transfer center from [facility]. Pedro Reed is the accepting physician. She asked that patients face sheet be faxed to [phone]. Cm did fax and have confirmation with job no. [phone]. They will accept patient to the telemetry unit  At [facility] however they will call when they have a bed available. Telephone call from [facility] They can accept the patient today she will need ALs transport [place] [facility] 10th [facility] East room 334A. RN please call report to [phone] at [address] is setting up transportation at this time   Patient will need Emtala form it has been signed  By Dr. Ronnie Valle and form given to [facility], Devendra B [address] Management Interventions  PCP Verified by CM: Yes  Mode of Transport at Discharge: ALS  Transition of Care Consult (CM Consult): Other ([facility])  Current Support Network:  Other  Confirm Follow Up Transport:  (als to [employer])  Plan discussed with Pt/Family/Caregiver: Yes  Discharge Location  Discharge Placement: Other: ([facility]

## 2018-03-16 NOTE — PROGRESS NOTES
Cardiology Progress Note        Patient: Brenton Sevilla        Sex: female          DOA: 3/15/2018  YOB: 1960      Age:  63 y.o.        LOS:  LOS: 1 day   Assessment/Plan     Principal Problem:    3-vessel CAD (3/15/2018)    Active Problems:    Essential hypertension (3/16/2018)      Hyperlipidemia (3/15/2018)      Morbid obesity ([address] (3/15/2018)        Plan:  Severe multivessel CAD  Discussed with cardiac surgeon Dr. Wilber Scott, who have accepted the patient for CABG at [facility]  Patient have CD of angiography  EMTALA is done  DC summary dictated. Subjective:    cc:  No CP    REVIEW OF SYSTEMS:     General: No fevers or chills. Cardiovascular: No chest pain or pressure. No palpitations. Pre syncope, syncope. No ankle swelling  Pulmonary: No SOB, orthopnea, PND  Gastrointestinal: No nausea, vomiting or diarrhea      Objective:      Visit Vitals    /72 (BP 1 Location: Right arm, BP Patient Position: At rest;Supine; Head of bed elevated (Comment degrees))    Pulse 70    Temp 97.7 °F (36.5 °C)    Resp 16    Ht 5' 4\" (1.626 m)    Wt 103 kg (227 lb 1.2 oz)    SpO2 98%    Breastfeeding No    BMI 38.98 kg/m2     Body mass index is 38.98 kg/(m^2). Physical Exam:  General Appearance: Comfortable, not using accessory muscles of respiration. HEENT: AMIRAH. HEAD: Atraumatic  NECK: No JVD, no thyroidomeglay. LUNGS: Clear bilaterally. , no added sounds   HEART: S1+S2 audible, no added sound    ABD: Non-tender, BS Audible    EXT: No edema, and no cysnosis. VASCULAR EXAM: Pulses are intact. PSYCHIATRIC EXAM: Mood is appropriate. MUSCULOSKELETAL: Grossly no joint deformity. NEUROLOGICAL: No motor and sensory deficit, Cranial nerves II-XII intact.   Medication:  Current Facility-Administered Medications   Medication Dose Route Frequency    0.9% sodium chloride infusion  50 mL/hr IntraVENous CONTINUOUS    LORazepam (ATIVAN) tablet 0.5-1 mg  0.5-1 mg Oral ON CALL    bivalirudin (ANGIOMAX) 250 mg in 0.9% sodium chloride (MBP/ADV) 50 mL infusion  1.75 mg/kg/hr IntraVENous CONTINUOUS    sodium chloride (NS) flush 5-10 mL  5-10 mL IntraVENous Q8H    sodium chloride (NS) flush 5-10 mL  5-10 mL IntraVENous PRN    aspirin chewable tablet 81 mg  81 mg Oral DAILY    umeclidinium-vilanterol (ANORO ELLIPTA) 62.5 mcg- 25 mcg/inhalation  1 Puff Inhalation DAILY    albuterol (PROVENTIL VENTOLIN) nebulizer solution 2.5 mg  2.5 mg Nebulization Q4H PRN    amLODIPine (NORVASC) tablet 5 mg  5 mg Oral DAILY    fluticasone (FLONASE) 50 mcg/actuation nasal spray 2 Spray  2 Spray Both Nostrils BID    furosemide (LASIX) tablet 40 mg  40 mg Oral DAILY    pregabalin (LYRICA) capsule 300 mg  300 mg Oral BID    QUEtiapine (SEROquel) tablet 50 mg  50 mg Oral QHS    raNITIdine (ZANTAC) tablet 150 mg  150 mg Oral BID    simvastatin (ZOCOR) tablet 40 mg  40 mg Oral QHS    ARIPiprazole (ABILIFY) tablet 2 mg  2 mg Oral QHS    clonazePAM (KlonoPIN) tablet 0.5 mg  0.5 mg Oral TID    lamoTRIgine (LaMICtal) tablet 150 mg  150 mg Oral BID    losartan (COZAAR) tablet 100 mg  100 mg Oral DAILY    metoprolol tartrate (LOPRESSOR) tablet 50 mg  50 mg Oral BID    gabapentin (NEURONTIN) capsule 300 mg  300 mg Oral BID    levomilnacipran (FETZIMA) ER capsule 40 mg  (Patient Supplied)  40 mg Oral BID               Lab/Data Reviewed:  Procedures/imaging: see electronic medical records for all procedures/Xrays   and details which were not copied into this note but were reviewed prior to creation of Plan       All lab results for the last 24 hours reviewed.      Recent Labs      03/15/18   0815   WBC  5.7   HGB  14.3   HCT  43.5   PLT  281     Recent Labs      03/15/18   0815   NA  138   K  4.2   CL  104   CO2  28   GLU  93   BUN  27*   CREA  1.17   CA  9.3       Signed By: Melanie Perkins MD     March 16, 2018

## 2018-03-16 NOTE — PROGRESS NOTES
Report called to nurse Ashlyn Wilkins at [facility]. Patient will be transported by [facility] to the 10 th floor East room 334A. Heparin gtt will be infusing in route at 9.8 units/kg/hr wt. 103kg dose weight, next PTT due at 2142 today. Opportunity for questions and call back number provided.

## 2018-03-16 NOTE — PROGRESS NOTES
2335:  Reassessment completed and VS obtained. Patient very drowsy. Concerned that patient takes multiple antidepressant medications and other sedating type medications. Patient spilled ginger ale on herself while trying to get a drink to swallow pills. Complete bed linen and gown change completed. Patient instructed to not get out of bed without assistance due to her drowsiness and she agrees. Bed alarm engaged.

## 2018-03-16 NOTE — PROGRESS NOTES
0730 Assumed care of patient from Isrrael Granda [address]. Patient resting with eyes closed,no signs of distress,call bell within reach. 1046 Held Lyrica, Neurontin, and Klonopin this morning for sedation. Patient eyes open to voice, however patient unable to stay awake to follow commands. Will continue to monitor. 1230 Patient more alert, continues to sleep between care. Will continue to monitor. 1 Dr Aileen Bowers on unit, made aware that sedatives were held this morning for lethargy. Verbal orders received to hold Lyrica, Neurontin and Klonopin for sedation. 1542 Heparin gtt started at lower rate, next ptt due at 2142 today. Patient educated on the indication for Heparin gtt. Verbalized understanding.

## 2018-03-16 NOTE — ROUTINE PROCESS
Bedside and Verbal shift change report given to Morgan Low RN (oncoming nurse) by ALESSANDRA Goncalves RN (offgoing nurse). Report included the following information SBAR, Kardex, Intake/Output, MAR and Recent Results. Alarm parameters reviewed and opportunities for questions provided.

## 2018-03-16 NOTE — PROGRESS NOTES
Chart reviewed. Pt admitted for cardiac cath. Per chart, pt to transfer to another facility. CM will be available for discharge planning, as needed. Readmission Risk Assessment: Low Risk and MSSP/Good Help ACO patients    RRAT Score: 1 - 12    Initial Assessment:  See clinicals    Emergency Contact:    Name Relation Home Work [place] Mother [phone]      Pertinent Medical Hx: asthma, COPD, GERD, anxiety    PCP/Specialists: PCP [facility]:     DME:     Low Risk Care Transition Plan:  1. Evaluate for [facility] or [address] coordination of resources  2. Involve patient/caregiver in assessment, planning, education and implement of intervention. 3. CM daily patient care huddles/interdisciplinary rounds. 4. PCP/Specialist appointment within 7 - 10 days made prior to discharge. 5. Facilitate transportation and logistics for follow-up appointments.   6. Handoff to [address] Nurse Navigator or PCP practice    Care Management Interventions  Transition of Care Consult (CM Consult): Discharge Planning

## 2018-03-16 NOTE — ROUTINE PROCESS
Cardiac cath films given to patient, instructed to take with her on discharge and give to MD assuming her care.

## 2018-03-17 NOTE — DISCHARGE SUMMARY
[address] SUMMARY    Name:Martha POLLACK.  MR#: [id number]  : 1960  ACCOUNT #: [de-identified]   ADMIT DATE: 03/15/2018  DISCHARGE DATE: 2018    DISCHARGE DIAGNOSES:  1. Severe multivessel coronary artery disease. 2.  Hypertension. 3.  Severe morbid obesity. 4.  Dyslipidemia. 5.  Chronic back pain. DISCHARGE MEDICATIONS:  Include:    1. Amlodipine 5 mg daily. 2.  Abilify. 3.  Aspirin. 4.  Clonazepam.  5.  Flonase. 6.  Lasix. 7.  Gabapentin. 8.  Lamictal.  9.    10.  Losartan. 11.  Metoprolol. 12.  Lyrica. 13.  Seroquel. 14.  Zantac. 15.  Zocor. 16.  Simvastatin. 17.  The patient is on IV heparin ACS protocol. DISCHARGE CONDITION:  Stable, hemodynamically stable, without any chest pain. OBJECTIVE:  VITAL SIGNS:  Temperature is 97.7, heart rate is 70 per minute, respirations 16 per minute, blood pressure is 138/72, O2 saturation is 98%. HEENT:  Head is atraumatic, normocephalic. NECK:  Supple, no JVD, no carotid bruit. HEART:  S1, S2 audible. LUNGS:  Bilateral air entry positive. No added sound. ABDOMEN:  Soft, nontender. Bowel sounds audible. EXTREMITIES:  No edema. Pulses are palpable. NEUROLOGIC:  No focal motor or sensory deficit. DERMATOLOGIC:  No skin rash. MUSCULOSKELETAL:  No obvious joint deformity. Right radial area, no hematoma. Cardiac telemetry stable. DISCHARGE CONDITION:  Stable. DISPOSITION:  The patient will be transferred to [facility] for multivessel bypass surgery; accepting physician is Dr. Mame Seaman. Discussed with the family and patient. DISCHARGE TIME:  More than 30 minutes. LABORATORY DATA:  Includes yesterday hemoglobin 14.3, platelet count 371. Sodium 138, potassium 4.2, creatinine 1.17. Please see the cath report.   The patient was given CD of the angiogram.      Gisell Heaton MD MA/TIM  D: 2018 15:12     T: 2018 19:05  JOB #: [id number]

## 2019-06-03 ENCOUNTER — OFFICE VISIT (OUTPATIENT)
Dept: SURGERY | Age: 59
End: 2019-06-03

## 2019-06-03 VITALS
TEMPERATURE: 97.6 F | HEART RATE: 88 BPM | OXYGEN SATURATION: 98 % | WEIGHT: 244.6 LBS | BODY MASS INDEX: 41.76 KG/M2 | DIASTOLIC BLOOD PRESSURE: 69 MMHG | HEIGHT: 64 IN | SYSTOLIC BLOOD PRESSURE: 117 MMHG | RESPIRATION RATE: 16 BRPM

## 2019-06-03 DIAGNOSIS — E66.01 MORBID OBESITY (HCC): Primary | ICD-10-CM

## 2019-06-03 DIAGNOSIS — F31.9 BIPOLAR 1 DISORDER, DEPRESSED (HCC): ICD-10-CM

## 2019-06-03 DIAGNOSIS — E78.5 HYPERLIPIDEMIA, UNSPECIFIED HYPERLIPIDEMIA TYPE: ICD-10-CM

## 2019-06-03 DIAGNOSIS — K90.9 INTESTINAL MALABSORPTION, UNSPECIFIED TYPE: ICD-10-CM

## 2019-06-03 DIAGNOSIS — Z95.1 AORTOCORONARY BYPASS STATUS: ICD-10-CM

## 2019-06-03 DIAGNOSIS — M54.5 LOW BACK PAIN, UNSPECIFIED BACK PAIN LATERALITY, UNSPECIFIED CHRONICITY, WITH SCIATICA PRESENCE UNSPECIFIED: ICD-10-CM

## 2019-06-03 DIAGNOSIS — E66.01 MORBID OBESITY WITH BMI OF 40.0-44.9, ADULT (HCC): ICD-10-CM

## 2019-06-03 DIAGNOSIS — I10 ESSENTIAL HYPERTENSION: ICD-10-CM

## 2019-06-03 DIAGNOSIS — I10 HTN (HYPERTENSION), BENIGN: ICD-10-CM

## 2019-06-03 RX ORDER — ATORVASTATIN CALCIUM 20 MG/1
20 TABLET, FILM COATED ORAL
COMMUNITY

## 2019-06-03 NOTE — PROGRESS NOTES
Revision Surgery Consultation    Subjective: The patient is a 63 y.o. obese female with a Body mass index is 41.99 kg/m². .  The patient had a Fobi Pouch procedure done approximatly 12 years ago in [place] by Dr. Malcolm Tierney. her starting weight prior to surgery was 280. she ultimately lost approximately 120 lbs with a subsequent weight regain of 80lbs. her peak EBWL  out from surgery was 80% and now her current EBWL is 24%. her last bariatric follow-up was innumerous years ago with Dr. Malcolm Tierney. Campos Padilla notes that she had no issues in the immediate post-op phase and had no hospital readmissions in the remote post-op phase. she currently is having the following issues related to his health:Weight gain. she is here today to discuss a possible revision of her Fobi pouch because of weight regain. All of their prior evaluations available by both their PCP's and specialists physicians have been reviewed today either in the Care Everywhere portal or scanned under the media tab. I have spent a large portion of my initial consultation today reviewing the patients current dietary habits which have contributed to their health issues, weight regain and  their current obesity. They understand that generally speaking,  weight regain is  a function of resuming less that ideal dietary habits instead of being a procedural issue. They understand that older procedures are more likely to be associated with a less that perfect procedural result, such as a prior vertical banded gastroplasty or non divided gastric bypass. These procedures are more likely to result in staple line failures with resultant weight regain. This has been explained to the patient via diagrams of these older procedures and given to the patient. I have suggested to them personally a dietary regimen that they can initiate now to help with their status as it pertains to their weight.   They understand that the most important aspect of their journey through their weight loss endeavor will be their adherence to a new lifestyle of healthy eating behavior. They also understand that an adherence to an exercise program will not only help with weight loss but is ultimately important in weight maintenance. Patient Active Problem List    Diagnosis Date Noted    Essential hypertension 2018    Aortocoronary bypass status 03/15/2018    3-vessel CAD 03/15/2018    Hyperlipidemia 03/15/2018    Morbid obesity ([facility] [address] 03/15/2018    Back pain 10/25/2012    Depression     Asthma     Bipolar 1 disorder, depressed ([facility] [address]     PTSD (post-traumatic stress disorder)     HTN (hypertension), benign       Past Surgical History:   Procedure Laterality Date    ABDOMEN SURGERY PROC UNLISTED      [facility]    BREAST SURGERY PROCEDURE UNLISTED      reduction    CARDIAC SURG PROCEDURE UNLIST      3 [place] [facility] [place]    DELIVERY       ENDOMETRIAL ABLATION, THERMAL      GASTRIC BYPASS,OBESITY,W/SM BOWEL RECONS      HX APPENDECTOMY      years ago    HX  SECTION      HX CHOLECYSTECTOMY      years ago    HX GASTRIC BYPASS      Dr Dari Meredith      HX HYSTERECTOMY      years ago    HX LUMBAR DISKECTOMY  2006      Social History     Tobacco Use    Smoking status: Current Every Day Smoker     Packs/day: 1.00    Smokeless tobacco: Never Used    Tobacco comment: cigars   Substance Use Topics    Alcohol use: Yes     Comment: rare      History reviewed. No pertinent family history. Current Outpatient Medications   Medication Sig Dispense Refill    atorvastatin (LIPITOR) 20 mg tablet Take  by mouth daily.  umeclidinium-vilanterol (ANORO ELLIPTA) 62.5-25 mcg/actuation inhaler Take 1 Puff by inhalation daily.  oxyCODONE IR (ROXICODONE) 10 mg tab immediate release tablet Take  by mouth every six (6) hours as needed.       aspirin 81 mg chewable tablet Take 81 mg by mouth daily.  albuterol (PROVENTIL VENTOLIN) 2.5 mg /3 mL (0.083 %) nebulizer solution by Nebulization route every four (4) hours as needed for Wheezing.  fluticasone (FLONASE) 50 mcg/actuation nasal spray 2 Sprays by Both Nostrils route two (2) times a day.  traMADol (ULTRAM) 50 mg tablet Take 50 mg by mouth every four (4) hours as needed for Pain.  levomilnacipran (FETZIMA) 40 mg ER capsule Take 40 mg by mouth two (2) times a day. Indications: fibromyalgia      pregabalin (LYRICA) 300 mg capsule Take 300 mg by mouth two (2) times a day.  furosemide (LASIX) 40 mg tablet Take 40 mg by mouth as needed. Indications: Edema      raNITIdine (ZANTAC) 150 mg tablet Take 150 mg by mouth two (2) times a day.  naproxen (NAPROSYN) 500 mg tablet Take 500 mg by mouth daily as needed.  ARIPiprazole (ABILIFY) 2 mg tablet Take 2 mg by mouth nightly.  metoprolol tartrate (LOPRESSOR) 50 mg tablet Take 50 mg by mouth two (2) times a day.  losartan (COZAAR) 100 mg tablet Take 100 mg by mouth daily.  lamoTRIgine (LAMICTAL) 100 mg tablet Take 150 mg by mouth two (2) times a day. Indications: DEPRESSION ASSOCIATED WITH BIPOLAR DISORDER      clonazePAM (KLONOPIN) 0.5 mg tablet Take 1 mg by mouth three (3) times daily.  gabapentin (NEURONTIN) 800 mg tablet Take 300 mg by mouth two (2) times a day.  Indications: can take up to 3 capsules 3 times a day       Allergies   Allergen Reactions    Hydrocodone Nausea and Vomiting          Review of Systems:            General - No history or complaints of unexpected fever, chills, or weight loss  Head/Neck - No history or complaints of headache, diplopia, dysphagia, hearing loss  Cardiac - No history or complaints of chest pain, palpitations, murmur, or shortness of breath  Pulmonary - No history or complaints of shortness of breath, productive cough, hemoptysis  Gastrointestinal - No history or complaints of reflux,  abdominal pain, obstipation/constipation, blood per rectum  Genitourinary - No history or complaints of hematuria/dysuria, stress urinary incontinence symptoms, or renal lithiasis  Musculoskeletal - No history or complaints of joint pain or muscular weakness  Hematologic - No history or complaints of bleeding disorders, blood transfusions, sickle cell anemia  Neurologic - No history or complaints of  migraine headaches, seizure activity, syncopal episodes, TIA or stroke  Integumentary - No history or complaints of rashes, abnormal nevi, skin cancer  Gynecological - n/a           Objective:     Visit Vitals  /69 (BP 1 Location: Left arm, BP Patient Position: Sitting)   Pulse 88   Temp 97.6 °F (36.4 °C)   Resp 16   Ht 5' 4\" (1.626 m)   Wt 110.9 kg (244 lb 9.6 oz)   SpO2 98%   BMI 41.99 kg/m²       Physical Examination: General appearance - alert, well appearing, and in no distress and oriented to person, place, and time  Mental status - alert, oriented to person, place, and time, normal mood, behavior, speech, dress, motor activity, and thought processes  Eyes - pupils equal and reactive, extraocular eye movements intact, sclera anicteric, left eye normal, right eye normal  Ears - right ear normal, left ear normal  Nose - normal and patent, no erythema, discharge or polyps  Mouth - mucous membranes moist, pharynx normal without lesions  Neck - supple, no significant adenopathy  Lymphatics - no palpable lymphadenopathy, no hepatosplenomegaly  Chest - clear to auscultation, no wheezes, rales or rhonchi, symmetric air entry  Heart - normal rate, regular rhythm, normal S1, S2, no murmurs, rubs, clicks or gallops  Abdomen - soft, nontender, nondistended, no masses or organomegaly, log midline incision  Back exam - limited range of motion, no tenderness, palpable spasm or pain on motion  Neurological - alert, oriented, normal speech, no focal findings or movement disorder noted  Musculoskeletal - no joint tenderness, deformity or swelling  Extremities - peripheral pulses normal, no pedal edema, no clubbing or cyanosis  Skin - normal coloration and turgor, no rashes, no suspicious skin lesions noted    Labs / Old Records: Old operative reports reviewed if available and are scanned under the media tab or reviewed under Care Everywhere        Assessment:     Morbid obesity status post Fobi pouch procedure approximately 12 years ago with complaint of weight regain. Plan: 1. Weight regain-Today in our office I had a lengthy discussion with Della Matthews regarding the nature of their prior procedure. We discussed the anatomical changes to their anatomy and how this relates to  contributing weight regain. Our office will continue to attempt to obtain any medical records, if not obtained or available already ,related to their procedure. It was also discussed today that before any decisions can be made regarding a possible revision of their initial  procedure that an upper GI swallow study must be obtained to evaluate their post surgical anatomy. They understand that in patients with a prior open gastric bypass, those patients are not revision candidates due to adhesive disease usually, and the fact that post surgical anatomy  usually can not be improved upon. They understand if their gastric bypass was performed laparoscopically, then this situation might be more amendable to gastric band placement over their prior gastric bypass. They understand, as I have explained today, that the adhesive disease associated with prior  procedures is at times a rate limiting factor. This precludes our ability to perform a revision procedure safely. The factors that contribute to this are increased risk such as age, health issues and increased risk from a procedural standpoint and have been discussed today. We will proceed with the UGI swallow study as described above.   The patient understands all of the above and wishes to proceed with the study. 2.Nutrition-  I have discussed in detail the pitfalls in diet that have contributed to their weight regain and the importance of adhering to a lifelong regimen of dietary goals and proper eating habits. I have discussed the proper lifelong bariatric diet  in detail spending in excess of 20 minutes discussing this. We will schedule them for a dietary consultation with our nutritionist and urge them to continue on a regular follow-up schedule with her. 3.Maintenance vitamins- Today we have discussed the importance of vitamins as it pertains to their procedure and we will obtain appropriate lab to check all levels. They have been provided a handout regarding this today. Total time spent with the patient reviewing their complex history of bariatric surgery,diet, and plan is in excess of 60 minutes. Secondary Diagnoses:     Smoking Cessation - Today I have counseled the patient extensively regarding smoking cessation for greater than 10 minutes. They have been counseled extensively about the detrimental effects of smoking on their weight loss surgical procedure particularly for the gastric bypass and sleeve gastrectomy procedures. They understand that smoking leads to pulmonary issues postoperatively and can lead to gastric ulcers and marginal ulcers in the post bariatric surgery pouch that has been created. They understand that they must stop smoking 1 month at least prior to surgery or it may affect their ultimate progression to their procedure. They understand finally that labs may be obtained to prove that they have ceased smoking prior to surgery. Total time counseling was greater than 10 minutes.     Signed By: Cee Beckham MD     Magi 3, 2019

## 2019-08-28 ENCOUNTER — HOSPITAL ENCOUNTER (OUTPATIENT)
Dept: LAB | Age: 59
Discharge: HOME | End: 2019-08-28
Payer: MEDICARE

## 2019-08-28 ENCOUNTER — HOSPITAL ENCOUNTER (OUTPATIENT)
Age: 59
Setting detail: OUTPATIENT SURGERY
Discharge: HOME | End: 2019-08-28
Attending: SPECIALIST | Admitting: SPECIALIST
Payer: MEDICARE

## 2019-08-28 ENCOUNTER — APPOINTMENT (OUTPATIENT)
Dept: GENERAL RADIOLOGY | Age: 59
End: 2019-08-28
Attending: SPECIALIST
Payer: MEDICARE

## 2019-08-28 VITALS
BODY MASS INDEX: 40.75 KG/M2 | WEIGHT: 238.7 LBS | TEMPERATURE: 97.2 F | HEART RATE: 66 BPM | RESPIRATION RATE: 16 BRPM | HEIGHT: 64 IN | DIASTOLIC BLOOD PRESSURE: 78 MMHG | SYSTOLIC BLOOD PRESSURE: 131 MMHG | OXYGEN SATURATION: 96 %

## 2019-08-28 DIAGNOSIS — K90.9 IDIOPATHIC STEATORRHEA: ICD-10-CM

## 2019-08-28 DIAGNOSIS — E66.01 MORBID OBESITY (HCC): ICD-10-CM

## 2019-08-28 LAB
ALBUMIN SERPL-MCNC: 3.5 G/DL (ref 3.4–5)
ALBUMIN/GLOB SERPL: 1 {RATIO} (ref 0.8–1.7)
ALP SERPL-CCNC: 142 U/L (ref 45–117)
ALT SERPL-CCNC: 23 U/L (ref 13–56)
ANION GAP SERPL CALC-SCNC: 6 MMOL/L (ref 3–18)
AST SERPL-CCNC: 16 U/L (ref 10–38)
BASOPHILS # BLD: 0 K/UL (ref 0–0.1)
BASOPHILS NFR BLD: 0 % (ref 0–2)
BILIRUB SERPL-MCNC: 0.5 MG/DL (ref 0.2–1)
BUN SERPL-MCNC: 21 MG/DL (ref 7–18)
BUN/CREAT SERPL: 18 (ref 12–20)
CALCIUM SERPL-MCNC: 9.6 MG/DL (ref 8.5–10.1)
CHLORIDE SERPL-SCNC: 105 MMOL/L (ref 100–111)
CO2 SERPL-SCNC: 29 MMOL/L (ref 21–32)
CREAT SERPL-MCNC: 1.2 MG/DL (ref 0.6–1.3)
DIFFERENTIAL METHOD BLD: ABNORMAL
EOSINOPHIL # BLD: 0.2 K/UL (ref 0–0.4)
EOSINOPHIL NFR BLD: 4 % (ref 0–5)
ERYTHROCYTE [DISTWIDTH] IN BLOOD BY AUTOMATED COUNT: 14.7 % (ref 11.6–14.5)
FERRITIN SERPL-MCNC: 32 NG/ML (ref 8–388)
FOLATE SERPL-MCNC: 7.9 NG/ML (ref 3.1–17.5)
GLOBULIN SER CALC-MCNC: 3.5 G/DL (ref 2–4)
GLUCOSE SERPL-MCNC: 93 MG/DL (ref 74–99)
HCT VFR BLD AUTO: 41.7 % (ref 35–45)
HGB BLD-MCNC: 13.4 G/DL (ref 12–16)
IRON SERPL-MCNC: 115 UG/DL (ref 50–175)
LYMPHOCYTES # BLD: 2.6 K/UL (ref 0.9–3.6)
LYMPHOCYTES NFR BLD: 39 % (ref 21–52)
MCH RBC QN AUTO: 30.7 PG (ref 24–34)
MCHC RBC AUTO-ENTMCNC: 32.1 G/DL (ref 31–37)
MCV RBC AUTO: 95.4 FL (ref 74–97)
MONOCYTES # BLD: 0.6 K/UL (ref 0.05–1.2)
MONOCYTES NFR BLD: 9 % (ref 3–10)
NEUTS SEG # BLD: 3.3 K/UL (ref 1.8–8)
NEUTS SEG NFR BLD: 48 % (ref 40–73)
PLATELET # BLD AUTO: 299 K/UL (ref 135–420)
PMV BLD AUTO: 9.3 FL (ref 9.2–11.8)
POTASSIUM SERPL-SCNC: 4.5 MMOL/L (ref 3.5–5.5)
PROT SERPL-MCNC: 7 G/DL (ref 6.4–8.2)
RBC # BLD AUTO: 4.37 M/UL (ref 4.2–5.3)
SODIUM SERPL-SCNC: 140 MMOL/L (ref 136–145)
VIT B12 SERPL-MCNC: 268 PG/ML (ref 211–911)
WBC # BLD AUTO: 6.8 K/UL (ref 4.6–13.2)

## 2019-08-28 PROCEDURE — 80053 COMPREHEN METABOLIC PANEL: CPT

## 2019-08-28 PROCEDURE — 84425 ASSAY OF VITAMIN B-1: CPT

## 2019-08-28 PROCEDURE — 74011000255 HC RX REV CODE- 255: Performed by: SPECIALIST

## 2019-08-28 PROCEDURE — 83540 ASSAY OF IRON: CPT

## 2019-08-28 PROCEDURE — 36415 COLL VENOUS BLD VENIPUNCTURE: CPT

## 2019-08-28 PROCEDURE — 77030040361 HC SLV COMPR DVT MDII -B: Performed by: SPECIALIST

## 2019-08-28 PROCEDURE — 76040000019: Performed by: SPECIALIST

## 2019-08-28 PROCEDURE — 74240 X-RAY XM UPR GI TRC 1CNTRST: CPT

## 2019-08-28 PROCEDURE — 85025 COMPLETE CBC W/AUTO DIFF WBC: CPT

## 2019-08-28 PROCEDURE — 82728 ASSAY OF FERRITIN: CPT

## 2019-08-28 PROCEDURE — 82607 VITAMIN B-12: CPT

## 2019-08-28 NOTE — PROCEDURES
12 years post open johnathon emily gastric bypass at 24% weight loss seeking revision. UGI is normal - not a candidate for revision. Pt needs diet and exercise education. See dictation.

## 2019-08-29 NOTE — OP NOTES
[facility]  OPERATIVE REPORT    Name:  Fay Montoya  MR#:   [id number]  :  1960  ACCOUNT #:  [de-identified]  DATE OF SERVICE:  2019    PREOPERATIVE DIAGNOSIS:  The patient is 12 years out from a Fobi-Pouch by Dr. Yoshi Smith with full weight loss. POSTOPERATIVE DIAGNOSIS:  The patient is 12 years out from a Fobi pouch by Dr. Yoshi Smith with full weight loss with normal upper gastrointestinal study. PROCEDURE PERFORMED:  Upper gastrointestinal study with barium. SURGEON:  Therman Angelucci, MD    ASSISTANT:  None. ANESTHESIA:  None. COMPLICATIONS:  None. SPECIMENS REMOVED:  None. IMPLANTS:  None. ESTIMATED BLOOD LOSS:  None. STATEMENT OF MEDICAL NECESSITY:  The patient is a 45-year-old female who 12 years ago underwent a Fobi-Pouch by Dr. Yoshi Smith and initially lost weight very well. She is only at 25% excess body weight loss due to weight regain. She is here today for upper GI study and to assess her prior surgery. PROCEDURE:  The patient was brought to fluoro unit where she was given thin barium. On swallowing barium, she was noted to have normal peristalsis of her esophagus. She had prompt filling of the distal esophagus with tapering into and through the gastroesophageal junction. Contrast then filled a vertically oriented gastric pouch, which was normal in caliber and dimension. Contrast readily flowed into the Mac limb, which was normal also. We did follow up the contrast distally for about a 1-1/2 feet to 2 feet, and it was totally normal likewise. There was no dilation of gastric pouch. Her gastric pouch emptied normally, and she is not a candidate for any type of procedure due to the fact she has had an open operation via Fobi-Pouch. There is no way to revise her operation to make it better in my experience given the Fobi-Pouch anatomy with the small bowel being tacked along the lateral pouch.   These patients are not a candidate for band-over-bypass either. She understands all this very clearly and will pursue a medical weight loss plan.       Kira Torres MD      [id number]  D:  08/29/2019 7:04  T:  08/29/2019 10:25  JOB #:  [id number]

## 2019-08-31 LAB — VIT B1 BLD-SCNC: 136 NMOL/L (ref 66.5–200)

## 2020-08-14 ENCOUNTER — HOSPITAL ENCOUNTER (OUTPATIENT)
Dept: PREADMISSION TESTING | Age: 60
Discharge: HOME | End: 2020-08-14
Payer: MEDICARE

## 2020-08-14 PROCEDURE — 87635 SARS-COV-2 COVID-19 AMP PRB: CPT

## 2020-08-16 LAB — SARS-COV-2, COV2NT: NOT DETECTED

## 2020-08-17 ENCOUNTER — HOSPITAL ENCOUNTER (OUTPATIENT)
Dept: PREADMISSION TESTING | Age: 60
Discharge: HOME | End: 2020-08-17
Payer: MEDICARE

## 2020-08-17 LAB
ABO + RH BLD: NORMAL
ALBUMIN SERPL-MCNC: 3.1 G/DL (ref 3.4–5)
ALBUMIN/GLOB SERPL: 0.9 {RATIO} (ref 0.8–1.7)
ALP SERPL-CCNC: 145 U/L (ref 45–117)
ALT SERPL-CCNC: 14 U/L (ref 13–56)
ANION GAP SERPL CALC-SCNC: 5 MMOL/L (ref 3–18)
APTT PPP: 28.3 SEC (ref 23–36.4)
AST SERPL-CCNC: 13 U/L (ref 10–38)
BILIRUB SERPL-MCNC: 0.3 MG/DL (ref 0.2–1)
BLOOD GROUP ANTIBODIES SERPL: NORMAL
BUN SERPL-MCNC: 22 MG/DL (ref 7–18)
BUN/CREAT SERPL: 19 (ref 12–20)
CALCIUM SERPL-MCNC: 9 MG/DL (ref 8.5–10.1)
CHLORIDE SERPL-SCNC: 105 MMOL/L (ref 100–111)
CO2 SERPL-SCNC: 30 MMOL/L (ref 21–32)
CREAT SERPL-MCNC: 1.15 MG/DL (ref 0.6–1.3)
ERYTHROCYTE [DISTWIDTH] IN BLOOD BY AUTOMATED COUNT: 16.7 % (ref 11.6–14.5)
GLOBULIN SER CALC-MCNC: 3.5 G/DL (ref 2–4)
GLUCOSE SERPL-MCNC: 86 MG/DL (ref 74–99)
HCT VFR BLD AUTO: 40.5 % (ref 35–45)
HGB BLD-MCNC: 13.1 G/DL (ref 12–16)
INR PPP: 1 (ref 0.8–1.2)
MCH RBC QN AUTO: 29.8 PG (ref 24–34)
MCHC RBC AUTO-ENTMCNC: 32.3 G/DL (ref 31–37)
MCV RBC AUTO: 92.3 FL (ref 74–97)
PLATELET # BLD AUTO: 313 K/UL (ref 135–420)
PMV BLD AUTO: 8.5 FL (ref 9.2–11.8)
POTASSIUM SERPL-SCNC: 4.3 MMOL/L (ref 3.5–5.5)
PROT SERPL-MCNC: 6.6 G/DL (ref 6.4–8.2)
PROTHROMBIN TIME: 13 SEC (ref 11.5–15.2)
RBC # BLD AUTO: 4.39 M/UL (ref 4.2–5.3)
SODIUM SERPL-SCNC: 140 MMOL/L (ref 136–145)
SPECIMEN EXP DATE BLD: NORMAL
WBC # BLD AUTO: 6.9 K/UL (ref 4.6–13.2)

## 2020-08-17 PROCEDURE — 85027 COMPLETE CBC AUTOMATED: CPT

## 2020-08-17 PROCEDURE — 85730 THROMBOPLASTIN TIME PARTIAL: CPT

## 2020-08-17 PROCEDURE — 93005 ELECTROCARDIOGRAM TRACING: CPT

## 2020-08-17 PROCEDURE — 86900 BLOOD TYPING SEROLOGIC ABO: CPT

## 2020-08-17 PROCEDURE — 80053 COMPREHEN METABOLIC PANEL: CPT

## 2020-08-17 PROCEDURE — 85610 PROTHROMBIN TIME: CPT

## 2020-08-17 PROCEDURE — 36415 COLL VENOUS BLD VENIPUNCTURE: CPT

## 2020-08-18 LAB
ATRIAL RATE: 79 BPM
BACTERIA SPEC CULT: NORMAL
BACTERIA SPEC CULT: NORMAL
CALCULATED P AXIS, ECG09: 18 DEGREES
CALCULATED R AXIS, ECG10: 28 DEGREES
CALCULATED T AXIS, ECG11: 106 DEGREES
DIAGNOSIS, 93000: NORMAL
P-R INTERVAL, ECG05: 204 MS
Q-T INTERVAL, ECG07: 400 MS
QRS DURATION, ECG06: 68 MS
QTC CALCULATION (BEZET), ECG08: 458 MS
SERVICE CMNT-IMP: NORMAL
VENTRICULAR RATE, ECG03: 79 BPM

## 2020-08-19 PROBLEM — M48.061 LUMBAR SPINAL STENOSIS: Status: ACTIVE | Noted: 2020-08-19

## 2020-08-19 PROBLEM — M47.816 LUMBAR SPONDYLOSIS: Status: ACTIVE | Noted: 2020-08-19

## 2020-08-19 PROBLEM — M54.10 RADICULOPATHY OF LEG: Status: ACTIVE | Noted: 2020-08-19

## 2020-08-19 RX ORDER — GABAPENTIN 800 MG/1
300 TABLET ORAL 2 TIMES DAILY
Status: CANCELLED | OUTPATIENT
Start: 2020-08-19

## 2020-08-19 RX ORDER — GABAPENTIN 300 MG/1
300 CAPSULE ORAL
Status: CANCELLED | OUTPATIENT
Start: 2020-08-19

## 2020-08-20 ENCOUNTER — APPOINTMENT (OUTPATIENT)
Dept: GENERAL RADIOLOGY | Age: 60
DRG: 453 | End: 2020-08-20
Attending: ORTHOPAEDIC SURGERY
Payer: MEDICARE

## 2020-08-20 ENCOUNTER — HOSPITAL ENCOUNTER (INPATIENT)
Age: 60
LOS: 4 days | Discharge: HOME HEALTH | DRG: 453 | End: 2020-08-25
Attending: ORTHOPAEDIC SURGERY | Admitting: ORTHOPAEDIC SURGERY
Payer: MEDICARE

## 2020-08-20 ENCOUNTER — ANESTHESIA (OUTPATIENT)
Dept: SURGERY | Age: 60
DRG: 453 | End: 2020-08-20
Payer: MEDICARE

## 2020-08-20 ENCOUNTER — ANESTHESIA EVENT (OUTPATIENT)
Dept: SURGERY | Age: 60
DRG: 453 | End: 2020-08-20
Payer: MEDICARE

## 2020-08-20 DIAGNOSIS — I95.9 ACUTE HYPOTENSION: ICD-10-CM

## 2020-08-20 DIAGNOSIS — I25.10 CORONARY ARTERY DISEASE INVOLVING NATIVE CORONARY ARTERY OF NATIVE HEART WITHOUT ANGINA PECTORIS: ICD-10-CM

## 2020-08-20 DIAGNOSIS — M48.062 SPINAL STENOSIS OF LUMBAR REGION WITH NEUROGENIC CLAUDICATION: Primary | ICD-10-CM

## 2020-08-20 LAB
HCT VFR BLD AUTO: 35.7 % (ref 35–45)
HGB BLD-MCNC: 11.4 G/DL (ref 12–16)

## 2020-08-20 PROCEDURE — 77030013708 HC HNDPC SUC IRR PULS STRY –B: Performed by: ORTHOPAEDIC SURGERY

## 2020-08-20 PROCEDURE — 77030040830 HC CATH URETH FOL MDII -A: Performed by: ORTHOPAEDIC SURGERY

## 2020-08-20 PROCEDURE — 94640 AIRWAY INHALATION TREATMENT: CPT

## 2020-08-20 PROCEDURE — 99218 HC RM OBSERVATION: CPT

## 2020-08-20 PROCEDURE — C1713 ANCHOR/SCREW BN/BN,TIS/BN: HCPCS | Performed by: ORTHOPAEDIC SURGERY

## 2020-08-20 PROCEDURE — 74011000250 HC RX REV CODE- 250: Performed by: NURSE ANESTHETIST, CERTIFIED REGISTERED

## 2020-08-20 PROCEDURE — 97530 THERAPEUTIC ACTIVITIES: CPT

## 2020-08-20 PROCEDURE — 77030003029 HC SUT VCRL J&J -B: Performed by: ORTHOPAEDIC SURGERY

## 2020-08-20 PROCEDURE — 74011000258 HC RX REV CODE- 258: Performed by: ANESTHESIOLOGY

## 2020-08-20 PROCEDURE — 74011000250 HC RX REV CODE- 250: Performed by: PHYSICIAN ASSISTANT

## 2020-08-20 PROCEDURE — 76210000000 HC OR PH I REC 2 TO 2.5 HR: Performed by: ORTHOPAEDIC SURGERY

## 2020-08-20 PROCEDURE — 97535 SELF CARE MNGMENT TRAINING: CPT

## 2020-08-20 PROCEDURE — 74011250637 HC RX REV CODE- 250/637: Performed by: NURSE ANESTHETIST, CERTIFIED REGISTERED

## 2020-08-20 PROCEDURE — 01NB0ZZ RELEASE LUMBAR NERVE, OPEN APPROACH: ICD-10-PCS | Performed by: ORTHOPAEDIC SURGERY

## 2020-08-20 PROCEDURE — 76060000041 HC ANESTHESIA 5 TO 5.5 HR: Performed by: ORTHOPAEDIC SURGERY

## 2020-08-20 PROCEDURE — C9290 INJ, BUPIVACAINE LIPOSOME: HCPCS | Performed by: ORTHOPAEDIC SURGERY

## 2020-08-20 PROCEDURE — 94760 N-INVAS EAR/PLS OXIMETRY 1: CPT

## 2020-08-20 PROCEDURE — 0SG10AJ FUSION OF 2 OR MORE LUMBAR VERTEBRAL JOINTS WITH INTERBODY FUSION DEVICE, POSTERIOR APPROACH, ANTERIOR COLUMN, OPEN APPROACH: ICD-10-PCS | Performed by: ORTHOPAEDIC SURGERY

## 2020-08-20 PROCEDURE — 74011000258 HC RX REV CODE- 258: Performed by: ORTHOPAEDIC SURGERY

## 2020-08-20 PROCEDURE — 77030010784 HC BOWL MX BN MEDT -C: Performed by: ORTHOPAEDIC SURGERY

## 2020-08-20 PROCEDURE — 77030033138 HC SUT PGA STRATFX J&J -B: Performed by: ORTHOPAEDIC SURGERY

## 2020-08-20 PROCEDURE — 77030040361 HC SLV COMPR DVT MDII -B: Performed by: ORTHOPAEDIC SURGERY

## 2020-08-20 PROCEDURE — 0QH304Z INSERTION OF INTERNAL FIXATION DEVICE INTO LEFT PELVIC BONE, OPEN APPROACH: ICD-10-PCS | Performed by: ORTHOPAEDIC SURGERY

## 2020-08-20 PROCEDURE — 0QH204Z INSERTION OF INTERNAL FIXATION DEVICE INTO RIGHT PELVIC BONE, OPEN APPROACH: ICD-10-PCS | Performed by: ORTHOPAEDIC SURGERY

## 2020-08-20 PROCEDURE — 36415 COLL VENOUS BLD VENIPUNCTURE: CPT

## 2020-08-20 PROCEDURE — 77030003194 HC GRFT RECOMB BN MEDT -I1: Performed by: ORTHOPAEDIC SURGERY

## 2020-08-20 PROCEDURE — 77030010507 HC ADH SKN DERMBND J&J -B: Performed by: ORTHOPAEDIC SURGERY

## 2020-08-20 PROCEDURE — 74011250636 HC RX REV CODE- 250/636: Performed by: NURSE ANESTHETIST, CERTIFIED REGISTERED

## 2020-08-20 PROCEDURE — 74011250636 HC RX REV CODE- 250/636: Performed by: ORTHOPAEDIC SURGERY

## 2020-08-20 PROCEDURE — 74011000250 HC RX REV CODE- 250: Performed by: ANESTHESIOLOGY

## 2020-08-20 PROCEDURE — 77030004435 HC BUR RND STRY -C: Performed by: ORTHOPAEDIC SURGERY

## 2020-08-20 PROCEDURE — 74011250636 HC RX REV CODE- 250/636: Performed by: PHYSICIAN ASSISTANT

## 2020-08-20 PROCEDURE — 76010000137 HC OR TIME 5 TO 5.5 HR: Performed by: ORTHOPAEDIC SURGERY

## 2020-08-20 PROCEDURE — 97167 OT EVAL HIGH COMPLEX 60 MIN: CPT

## 2020-08-20 PROCEDURE — 74011250636 HC RX REV CODE- 250/636: Performed by: ANESTHESIOLOGY

## 2020-08-20 PROCEDURE — 74011000272 HC RX REV CODE- 272: Performed by: ORTHOPAEDIC SURGERY

## 2020-08-20 PROCEDURE — 77030004402 HC BUR NEUR STRY -C: Performed by: ORTHOPAEDIC SURGERY

## 2020-08-20 PROCEDURE — 07DR3ZZ EXTRACTION OF ILIAC BONE MARROW, PERCUTANEOUS APPROACH: ICD-10-PCS | Performed by: ORTHOPAEDIC SURGERY

## 2020-08-20 PROCEDURE — 0SG1071 FUSION OF 2 OR MORE LUMBAR VERTEBRAL JOINTS WITH AUTOLOGOUS TISSUE SUBSTITUTE, POSTERIOR APPROACH, POSTERIOR COLUMN, OPEN APPROACH: ICD-10-PCS | Performed by: ORTHOPAEDIC SURGERY

## 2020-08-20 PROCEDURE — 0QP204Z REMOVAL OF INTERNAL FIXATION DEVICE FROM RIGHT PELVIC BONE, OPEN APPROACH: ICD-10-PCS | Performed by: ORTHOPAEDIC SURGERY

## 2020-08-20 PROCEDURE — 77030038552 HC DRN WND MDII -A: Performed by: ORTHOPAEDIC SURGERY

## 2020-08-20 PROCEDURE — 74011000250 HC RX REV CODE- 250: Performed by: ORTHOPAEDIC SURGERY

## 2020-08-20 PROCEDURE — 97162 PT EVAL MOD COMPLEX 30 MIN: CPT

## 2020-08-20 PROCEDURE — 77030034479 HC ADH SKN CLSR PRINEO J&J -B: Performed by: ORTHOPAEDIC SURGERY

## 2020-08-20 PROCEDURE — 0QP304Z REMOVAL OF INTERNAL FIXATION DEVICE FROM LEFT PELVIC BONE, OPEN APPROACH: ICD-10-PCS | Performed by: ORTHOPAEDIC SURGERY

## 2020-08-20 PROCEDURE — 74011250637 HC RX REV CODE- 250/637: Performed by: PHYSICIAN ASSISTANT

## 2020-08-20 PROCEDURE — 85018 HEMOGLOBIN: CPT

## 2020-08-20 PROCEDURE — 77030020782 HC GWN BAIR PAWS FLX 3M -B: Performed by: ORTHOPAEDIC SURGERY

## 2020-08-20 PROCEDURE — 77030016273 HC SPCR SPN CAPSTN MEDT -I1: Performed by: ORTHOPAEDIC SURGERY

## 2020-08-20 PROCEDURE — 0ST20ZZ RESECTION OF LUMBAR VERTEBRAL DISC, OPEN APPROACH: ICD-10-PCS | Performed by: ORTHOPAEDIC SURGERY

## 2020-08-20 PROCEDURE — 77030037875 HC DRSG MEPILEX <16IN BORD MOLN -A: Performed by: ORTHOPAEDIC SURGERY

## 2020-08-20 PROCEDURE — 77030018390 HC SPNG HEMSTAT2 J&J -B: Performed by: ORTHOPAEDIC SURGERY

## 2020-08-20 DEVICE — BONE GRAFT KIT 7510200 INFUSE SMALL
Type: IMPLANTABLE DEVICE | Site: SPINE LUMBAR | Status: FUNCTIONAL
Brand: INFUSE® BONE GRAFT

## 2020-08-20 DEVICE — SCREW 54840006540 MAS 6.5X40 CC
Type: IMPLANTABLE DEVICE | Site: SPINE LUMBAR | Status: FUNCTIONAL
Brand: CD HORIZON® SPINAL SYSTEM

## 2020-08-20 RX ORDER — NALOXONE HYDROCHLORIDE 0.4 MG/ML
0.4 INJECTION, SOLUTION INTRAMUSCULAR; INTRAVENOUS; SUBCUTANEOUS AS NEEDED
Status: DISCONTINUED | OUTPATIENT
Start: 2020-08-20 | End: 2020-08-25 | Stop reason: HOSPADM

## 2020-08-20 RX ORDER — POTASSIUM CHLORIDE 750 MG/1
10 TABLET, EXTENDED RELEASE ORAL DAILY
Status: DISCONTINUED | OUTPATIENT
Start: 2020-08-21 | End: 2020-08-25 | Stop reason: HOSPADM

## 2020-08-20 RX ORDER — VASOPRESSIN 20 U/ML
INJECTION PARENTERAL AS NEEDED
Status: DISCONTINUED | OUTPATIENT
Start: 2020-08-20 | End: 2020-08-20 | Stop reason: HOSPADM

## 2020-08-20 RX ORDER — CYCLOBENZAPRINE HCL 10 MG
10 TABLET ORAL
Status: DISCONTINUED | OUTPATIENT
Start: 2020-08-20 | End: 2020-08-25 | Stop reason: HOSPADM

## 2020-08-20 RX ORDER — FUROSEMIDE 40 MG/1
40 TABLET ORAL EVERY OTHER DAY
Status: DISCONTINUED | OUTPATIENT
Start: 2020-08-20 | End: 2020-08-23

## 2020-08-20 RX ORDER — ONDANSETRON 2 MG/ML
INJECTION INTRAMUSCULAR; INTRAVENOUS AS NEEDED
Status: DISCONTINUED | OUTPATIENT
Start: 2020-08-20 | End: 2020-08-20 | Stop reason: HOSPADM

## 2020-08-20 RX ORDER — FENTANYL CITRATE 50 UG/ML
25 INJECTION, SOLUTION INTRAMUSCULAR; INTRAVENOUS AS NEEDED
Status: DISCONTINUED | OUTPATIENT
Start: 2020-08-20 | End: 2020-08-20 | Stop reason: HOSPADM

## 2020-08-20 RX ORDER — LAMOTRIGINE 100 MG/1
150 TABLET ORAL 2 TIMES DAILY
Status: DISCONTINUED | OUTPATIENT
Start: 2020-08-20 | End: 2020-08-25 | Stop reason: HOSPADM

## 2020-08-20 RX ORDER — GLYCOPYRROLATE 0.2 MG/ML
INJECTION INTRAMUSCULAR; INTRAVENOUS AS NEEDED
Status: DISCONTINUED | OUTPATIENT
Start: 2020-08-20 | End: 2020-08-20 | Stop reason: HOSPADM

## 2020-08-20 RX ORDER — SODIUM CHLORIDE, SODIUM LACTATE, POTASSIUM CHLORIDE, CALCIUM CHLORIDE 600; 310; 30; 20 MG/100ML; MG/100ML; MG/100ML; MG/100ML
100 INJECTION, SOLUTION INTRAVENOUS CONTINUOUS
Status: DISCONTINUED | OUTPATIENT
Start: 2020-08-20 | End: 2020-08-20 | Stop reason: HOSPADM

## 2020-08-20 RX ORDER — HYDROMORPHONE HYDROCHLORIDE 2 MG/ML
INJECTION, SOLUTION INTRAMUSCULAR; INTRAVENOUS; SUBCUTANEOUS AS NEEDED
Status: DISCONTINUED | OUTPATIENT
Start: 2020-08-20 | End: 2020-08-20 | Stop reason: HOSPADM

## 2020-08-20 RX ORDER — ALBUTEROL SULFATE 90 UG/1
AEROSOL, METERED RESPIRATORY (INHALATION) AS NEEDED
Status: DISCONTINUED | OUTPATIENT
Start: 2020-08-20 | End: 2020-08-20 | Stop reason: HOSPADM

## 2020-08-20 RX ORDER — CLONAZEPAM 0.5 MG/1
1 TABLET ORAL
Status: DISCONTINUED | OUTPATIENT
Start: 2020-08-20 | End: 2020-08-25 | Stop reason: HOSPADM

## 2020-08-20 RX ORDER — NALOXONE HYDROCHLORIDE 0.4 MG/ML
0.1 INJECTION, SOLUTION INTRAMUSCULAR; INTRAVENOUS; SUBCUTANEOUS AS NEEDED
Status: DISCONTINUED | OUTPATIENT
Start: 2020-08-20 | End: 2020-08-20 | Stop reason: HOSPADM

## 2020-08-20 RX ORDER — HYDROMORPHONE HYDROCHLORIDE 2 MG/ML
0.5 INJECTION, SOLUTION INTRAMUSCULAR; INTRAVENOUS; SUBCUTANEOUS
Status: DISCONTINUED | OUTPATIENT
Start: 2020-08-20 | End: 2020-08-20 | Stop reason: HOSPADM

## 2020-08-20 RX ORDER — ATORVASTATIN CALCIUM 20 MG/1
20 TABLET, FILM COATED ORAL
Status: DISCONTINUED | OUTPATIENT
Start: 2020-08-20 | End: 2020-08-25 | Stop reason: HOSPADM

## 2020-08-20 RX ORDER — FAMOTIDINE 20 MG/1
20 TABLET, FILM COATED ORAL EVERY 12 HOURS
Status: DISCONTINUED | OUTPATIENT
Start: 2020-08-20 | End: 2020-08-25 | Stop reason: HOSPADM

## 2020-08-20 RX ORDER — METOPROLOL TARTRATE 50 MG/1
50 TABLET ORAL 2 TIMES DAILY
Status: DISCONTINUED | OUTPATIENT
Start: 2020-08-20 | End: 2020-08-21

## 2020-08-20 RX ORDER — SODIUM CHLORIDE 0.9 % (FLUSH) 0.9 %
5-40 SYRINGE (ML) INJECTION EVERY 8 HOURS
Status: DISCONTINUED | OUTPATIENT
Start: 2020-08-20 | End: 2020-08-20 | Stop reason: HOSPADM

## 2020-08-20 RX ORDER — PROPOFOL 10 MG/ML
INJECTION, EMULSION INTRAVENOUS AS NEEDED
Status: DISCONTINUED | OUTPATIENT
Start: 2020-08-20 | End: 2020-08-20 | Stop reason: HOSPADM

## 2020-08-20 RX ORDER — SODIUM CHLORIDE 0.9 % (FLUSH) 0.9 %
5-40 SYRINGE (ML) INJECTION AS NEEDED
Status: DISCONTINUED | OUTPATIENT
Start: 2020-08-20 | End: 2020-08-25 | Stop reason: HOSPADM

## 2020-08-20 RX ORDER — BISACODYL 5 MG
5 TABLET, DELAYED RELEASE (ENTERIC COATED) ORAL DAILY PRN
Status: DISCONTINUED | OUTPATIENT
Start: 2020-08-20 | End: 2020-08-25 | Stop reason: HOSPADM

## 2020-08-20 RX ORDER — LOSARTAN POTASSIUM 50 MG/1
100 TABLET ORAL DAILY
Status: DISCONTINUED | OUTPATIENT
Start: 2020-08-21 | End: 2020-08-21

## 2020-08-20 RX ORDER — NEOSTIGMINE METHYLSULFATE 1 MG/ML
INJECTION, SOLUTION INTRAVENOUS AS NEEDED
Status: DISCONTINUED | OUTPATIENT
Start: 2020-08-20 | End: 2020-08-20 | Stop reason: HOSPADM

## 2020-08-20 RX ORDER — EPHEDRINE SULFATE/0.9% NACL/PF 50 MG/5 ML
SYRINGE (ML) INTRAVENOUS AS NEEDED
Status: DISCONTINUED | OUTPATIENT
Start: 2020-08-20 | End: 2020-08-20 | Stop reason: HOSPADM

## 2020-08-20 RX ORDER — METOCLOPRAMIDE HYDROCHLORIDE 5 MG/ML
INJECTION INTRAMUSCULAR; INTRAVENOUS AS NEEDED
Status: DISCONTINUED | OUTPATIENT
Start: 2020-08-20 | End: 2020-08-20 | Stop reason: HOSPADM

## 2020-08-20 RX ORDER — ALBUTEROL SULFATE 0.83 MG/ML
2.5 SOLUTION RESPIRATORY (INHALATION)
Status: DISCONTINUED | OUTPATIENT
Start: 2020-08-20 | End: 2020-08-23

## 2020-08-20 RX ORDER — KETAMINE HYDROCHLORIDE 10 MG/ML
INJECTION, SOLUTION INTRAMUSCULAR; INTRAVENOUS AS NEEDED
Status: DISCONTINUED | OUTPATIENT
Start: 2020-08-20 | End: 2020-08-20 | Stop reason: HOSPADM

## 2020-08-20 RX ORDER — ROCURONIUM BROMIDE 10 MG/ML
INJECTION, SOLUTION INTRAVENOUS AS NEEDED
Status: DISCONTINUED | OUTPATIENT
Start: 2020-08-20 | End: 2020-08-20 | Stop reason: HOSPADM

## 2020-08-20 RX ORDER — OXYCODONE AND ACETAMINOPHEN 5; 325 MG/1; MG/1
1-2 TABLET ORAL
Status: DISCONTINUED | OUTPATIENT
Start: 2020-08-20 | End: 2020-08-21

## 2020-08-20 RX ORDER — HEPARIN SODIUM 10000 [USP'U]/ML
INJECTION, SOLUTION INTRAVENOUS; SUBCUTANEOUS AS NEEDED
Status: DISCONTINUED | OUTPATIENT
Start: 2020-08-20 | End: 2020-08-20 | Stop reason: HOSPADM

## 2020-08-20 RX ORDER — CEFAZOLIN SODIUM 2 G/50ML
2 SOLUTION INTRAVENOUS ONCE
Status: COMPLETED | OUTPATIENT
Start: 2020-08-20 | End: 2020-08-20

## 2020-08-20 RX ORDER — SODIUM CHLORIDE, SODIUM LACTATE, POTASSIUM CHLORIDE, CALCIUM CHLORIDE 600; 310; 30; 20 MG/100ML; MG/100ML; MG/100ML; MG/100ML
70 INJECTION, SOLUTION INTRAVENOUS CONTINUOUS
Status: DISCONTINUED | OUTPATIENT
Start: 2020-08-20 | End: 2020-08-22

## 2020-08-20 RX ORDER — ARIPIPRAZOLE 2 MG/1
2 TABLET ORAL
Status: DISCONTINUED | OUTPATIENT
Start: 2020-08-20 | End: 2020-08-25 | Stop reason: HOSPADM

## 2020-08-20 RX ORDER — PREGABALIN 100 MG/1
300 CAPSULE ORAL 2 TIMES DAILY
Status: DISCONTINUED | OUTPATIENT
Start: 2020-08-20 | End: 2020-08-25 | Stop reason: HOSPADM

## 2020-08-20 RX ORDER — HYDROMORPHONE HYDROCHLORIDE 1 MG/ML
1 INJECTION, SOLUTION INTRAMUSCULAR; INTRAVENOUS; SUBCUTANEOUS
Status: DISCONTINUED | OUTPATIENT
Start: 2020-08-20 | End: 2020-08-23

## 2020-08-20 RX ORDER — CEFAZOLIN SODIUM 2 G/50ML
2 SOLUTION INTRAVENOUS EVERY 6 HOURS
Status: DISCONTINUED | OUTPATIENT
Start: 2020-08-20 | End: 2020-08-21

## 2020-08-20 RX ORDER — SODIUM CHLORIDE 0.9 % (FLUSH) 0.9 %
5-40 SYRINGE (ML) INJECTION AS NEEDED
Status: DISCONTINUED | OUTPATIENT
Start: 2020-08-20 | End: 2020-08-20 | Stop reason: HOSPADM

## 2020-08-20 RX ORDER — MIDAZOLAM HYDROCHLORIDE 1 MG/ML
INJECTION, SOLUTION INTRAMUSCULAR; INTRAVENOUS AS NEEDED
Status: DISCONTINUED | OUTPATIENT
Start: 2020-08-20 | End: 2020-08-20 | Stop reason: HOSPADM

## 2020-08-20 RX ORDER — SODIUM CHLORIDE 0.9 % (FLUSH) 0.9 %
5-40 SYRINGE (ML) INJECTION EVERY 8 HOURS
Status: DISCONTINUED | OUTPATIENT
Start: 2020-08-20 | End: 2020-08-25 | Stop reason: HOSPADM

## 2020-08-20 RX ORDER — ONDANSETRON 2 MG/ML
4 INJECTION INTRAMUSCULAR; INTRAVENOUS
Status: DISCONTINUED | OUTPATIENT
Start: 2020-08-20 | End: 2020-08-25 | Stop reason: HOSPADM

## 2020-08-20 RX ORDER — ALBUTEROL SULFATE 0.83 MG/ML
2.5 SOLUTION RESPIRATORY (INHALATION)
Status: DISCONTINUED | OUTPATIENT
Start: 2020-08-20 | End: 2020-08-20 | Stop reason: HOSPADM

## 2020-08-20 RX ORDER — CHLORTHALIDONE 25 MG/1
25 TABLET ORAL DAILY
Status: DISCONTINUED | OUTPATIENT
Start: 2020-08-21 | End: 2020-08-21

## 2020-08-20 RX ORDER — TEMAZEPAM 15 MG/1
15 CAPSULE ORAL
Status: DISCONTINUED | OUTPATIENT
Start: 2020-08-20 | End: 2020-08-25 | Stop reason: HOSPADM

## 2020-08-20 RX ORDER — FENTANYL CITRATE 50 UG/ML
INJECTION, SOLUTION INTRAMUSCULAR; INTRAVENOUS AS NEEDED
Status: DISCONTINUED | OUTPATIENT
Start: 2020-08-20 | End: 2020-08-20 | Stop reason: HOSPADM

## 2020-08-20 RX ORDER — LIDOCAINE HYDROCHLORIDE 20 MG/ML
INJECTION, SOLUTION EPIDURAL; INFILTRATION; INTRACAUDAL; PERINEURAL AS NEEDED
Status: DISCONTINUED | OUTPATIENT
Start: 2020-08-20 | End: 2020-08-20 | Stop reason: HOSPADM

## 2020-08-20 RX ORDER — DIPHENHYDRAMINE HYDROCHLORIDE 50 MG/ML
12.5 INJECTION, SOLUTION INTRAMUSCULAR; INTRAVENOUS
Status: DISCONTINUED | OUTPATIENT
Start: 2020-08-20 | End: 2020-08-25 | Stop reason: HOSPADM

## 2020-08-20 RX ORDER — DIPHENHYDRAMINE HYDROCHLORIDE 50 MG/ML
12.5 INJECTION, SOLUTION INTRAMUSCULAR; INTRAVENOUS
Status: DISCONTINUED | OUTPATIENT
Start: 2020-08-20 | End: 2020-08-20 | Stop reason: HOSPADM

## 2020-08-20 RX ORDER — SODIUM CHLORIDE, SODIUM LACTATE, POTASSIUM CHLORIDE, CALCIUM CHLORIDE 600; 310; 30; 20 MG/100ML; MG/100ML; MG/100ML; MG/100ML
125 INJECTION, SOLUTION INTRAVENOUS CONTINUOUS
Status: DISCONTINUED | OUTPATIENT
Start: 2020-08-20 | End: 2020-08-22

## 2020-08-20 RX ORDER — ALBUTEROL SULFATE 90 UG/1
1 AEROSOL, METERED RESPIRATORY (INHALATION)
Status: DISCONTINUED | OUTPATIENT
Start: 2020-08-20 | End: 2020-08-25 | Stop reason: HOSPADM

## 2020-08-20 RX ADMIN — PREGABALIN 300 MG: 75 CAPSULE ORAL at 21:29

## 2020-08-20 RX ADMIN — FAMOTIDINE 20 MG: 20 TABLET, FILM COATED ORAL at 21:29

## 2020-08-20 RX ADMIN — GLYCOPYRROLATE 0.8 MG: 0.2 INJECTION INTRAMUSCULAR; INTRAVENOUS at 14:02

## 2020-08-20 RX ADMIN — VASOPRESSIN 1 UNITS: 20 INJECTION INTRAVENOUS at 10:42

## 2020-08-20 RX ADMIN — LIDOCAINE HYDROCHLORIDE 100 MG: 20 INJECTION, SOLUTION EPIDURAL; INFILTRATION; INTRACAUDAL; PERINEURAL at 09:54

## 2020-08-20 RX ADMIN — ALBUTEROL SULFATE 3 PUFF: 90 AEROSOL, METERED RESPIRATORY (INHALATION) at 14:35

## 2020-08-20 RX ADMIN — LAMOTRIGINE 150 MG: 100 TABLET ORAL at 21:29

## 2020-08-20 RX ADMIN — VASOPRESSIN 2 UNITS: 20 INJECTION INTRAVENOUS at 12:59

## 2020-08-20 RX ADMIN — CEFAZOLIN SODIUM 2 G: 2 SOLUTION INTRAVENOUS at 23:15

## 2020-08-20 RX ADMIN — METOCLOPRAMIDE 10 MG: 5 INJECTION, SOLUTION INTRAMUSCULAR; INTRAVENOUS at 13:27

## 2020-08-20 RX ADMIN — VASOPRESSIN 1 UNITS: 20 INJECTION INTRAVENOUS at 10:22

## 2020-08-20 RX ADMIN — VASOPRESSIN 2 UNITS: 20 INJECTION INTRAVENOUS at 11:59

## 2020-08-20 RX ADMIN — SODIUM CHLORIDE, SODIUM LACTATE, POTASSIUM CHLORIDE, AND CALCIUM CHLORIDE 70 ML/HR: 600; 310; 30; 20 INJECTION, SOLUTION INTRAVENOUS at 18:00

## 2020-08-20 RX ADMIN — ATORVASTATIN CALCIUM 20 MG: 20 TABLET, FILM COATED ORAL at 21:30

## 2020-08-20 RX ADMIN — PROPOFOL 100 MG: 10 INJECTION, EMULSION INTRAVENOUS at 09:54

## 2020-08-20 RX ADMIN — CYCLOBENZAPRINE HYDROCHLORIDE 10 MG: 10 TABLET, FILM COATED ORAL at 22:29

## 2020-08-20 RX ADMIN — LIDOCAINE HYDROCHLORIDE 100 MG: 20 INJECTION, SOLUTION EPIDURAL; INFILTRATION; INTRACAUDAL; PERINEURAL at 14:39

## 2020-08-20 RX ADMIN — SODIUM CHLORIDE, SODIUM LACTATE, POTASSIUM CHLORIDE, AND CALCIUM CHLORIDE: 600; 310; 30; 20 INJECTION, SOLUTION INTRAVENOUS at 09:40

## 2020-08-20 RX ADMIN — CEFAZOLIN SODIUM 2 G: 2 SOLUTION INTRAVENOUS at 19:41

## 2020-08-20 RX ADMIN — SODIUM CHLORIDE, SODIUM LACTATE, POTASSIUM CHLORIDE, AND CALCIUM CHLORIDE: 600; 310; 30; 20 INJECTION, SOLUTION INTRAVENOUS at 11:31

## 2020-08-20 RX ADMIN — SODIUM CHLORIDE, SODIUM LACTATE, POTASSIUM CHLORIDE, AND CALCIUM CHLORIDE: 600; 310; 30; 20 INJECTION, SOLUTION INTRAVENOUS at 10:34

## 2020-08-20 RX ADMIN — ROCURONIUM BROMIDE 50 MG: 10 INJECTION, SOLUTION INTRAVENOUS at 10:23

## 2020-08-20 RX ADMIN — PHENYLEPHRINE HYDROCHLORIDE 200 MCG: 10 INJECTION INTRAVENOUS at 10:20

## 2020-08-20 RX ADMIN — SODIUM CHLORIDE, SODIUM LACTATE, POTASSIUM CHLORIDE, AND CALCIUM CHLORIDE: 600; 310; 30; 20 INJECTION, SOLUTION INTRAVENOUS at 12:16

## 2020-08-20 RX ADMIN — MIDAZOLAM 1 MG: 1 INJECTION INTRAMUSCULAR; INTRAVENOUS at 09:41

## 2020-08-20 RX ADMIN — ROCURONIUM BROMIDE 20 MG: 10 INJECTION, SOLUTION INTRAVENOUS at 11:59

## 2020-08-20 RX ADMIN — Medication 4 MG: at 14:02

## 2020-08-20 RX ADMIN — PHENYLEPHRINE HYDROCHLORIDE 200 MCG: 10 INJECTION INTRAVENOUS at 13:56

## 2020-08-20 RX ADMIN — VASOPRESSIN 2 UNITS: 20 INJECTION INTRAVENOUS at 13:35

## 2020-08-20 RX ADMIN — VASOPRESSIN 1 UNITS: 20 INJECTION INTRAVENOUS at 11:03

## 2020-08-20 RX ADMIN — VASOPRESSIN 1 UNITS: 20 INJECTION INTRAVENOUS at 11:11

## 2020-08-20 RX ADMIN — HYDROMORPHONE HYDROCHLORIDE 0.5 MG: 2 INJECTION, SOLUTION INTRAMUSCULAR; INTRAVENOUS; SUBCUTANEOUS at 13:52

## 2020-08-20 RX ADMIN — FENTANYL CITRATE 100 MCG: 50 INJECTION, SOLUTION INTRAMUSCULAR; INTRAVENOUS at 09:50

## 2020-08-20 RX ADMIN — PHENYLEPHRINE HYDROCHLORIDE 200 MCG: 10 INJECTION INTRAVENOUS at 10:14

## 2020-08-20 RX ADMIN — MIDAZOLAM 1 MG: 1 INJECTION INTRAMUSCULAR; INTRAVENOUS at 09:44

## 2020-08-20 RX ADMIN — VASOPRESSIN 1 UNITS: 20 INJECTION INTRAVENOUS at 11:22

## 2020-08-20 RX ADMIN — ALBUTEROL SULFATE 2.5 MG: 2.5 SOLUTION RESPIRATORY (INHALATION) at 20:29

## 2020-08-20 RX ADMIN — Medication 20 MG: at 10:21

## 2020-08-20 RX ADMIN — CEFAZOLIN SODIUM 3 G: 2 SOLUTION INTRAVENOUS at 09:57

## 2020-08-20 RX ADMIN — KETAMINE HYDROCHLORIDE 20 MG: 10 INJECTION, SOLUTION INTRAMUSCULAR; INTRAVENOUS at 10:25

## 2020-08-20 RX ADMIN — ONDANSETRON HYDROCHLORIDE 4 MG: 2 INJECTION INTRAMUSCULAR; INTRAVENOUS at 13:27

## 2020-08-20 RX ADMIN — KETAMINE HYDROCHLORIDE 30 MG: 10 INJECTION, SOLUTION INTRAMUSCULAR; INTRAVENOUS at 13:27

## 2020-08-20 RX ADMIN — Medication 10 ML: at 22:30

## 2020-08-20 RX ADMIN — TRANEXAMIC ACID 1 G: 100 INJECTION, SOLUTION INTRAVENOUS at 10:26

## 2020-08-20 RX ADMIN — SODIUM CHLORIDE, SODIUM LACTATE, POTASSIUM CHLORIDE, AND CALCIUM CHLORIDE 125 ML/HR: 600; 310; 30; 20 INJECTION, SOLUTION INTRAVENOUS at 08:49

## 2020-08-20 RX ADMIN — VASOPRESSIN 2 UNITS: 20 INJECTION INTRAVENOUS at 11:47

## 2020-08-20 NOTE — PROGRESS NOTES
Problem: Mobility Impaired (Adult and Pediatric)  Goal: *Acute Goals and Plan of Care (Insert Text)  Description: In 1-4 days pt will be able to perform:  STG  1. Bed mobility:  Demonstrate proper log-roll technique for safe initiation of rolling for OOB activities. 2.  Supine to sit to supine S with HR for meals. 3.  Sit to stand to sit S with RW/LSO in prep for ambulation. LT.  Gait:  Ambulate 150ft S with RW/LSO, for home/community mobility. 2.  Activity tolerance: Tolerate up in chair 30 minutes-1 hour for ADL's.  3.  Patient/Family Education:  Patient/family to be independent with HEP for follow-up care and safe discharge. Note: []         Recommend HH with 24 hour adult care  [x]         Benefit from additional acute PT session to address: functional mobility, safety and optimal independence. PHYSICAL THERAPY EVALUATION    Patient: Angela Lugo (64 y.o. female)  Date: 2020  Primary Diagnosis: Lumbar spinal stenosis [M48.061]  Procedure(s) (LRB):  LUMBAR 3-LUMBAR 5 LAMINECTOMY, INSTRUMENTED FUSION WITH CAGES, EXPLORATION OF LUMBAR 5 - SACRAL 1 FUSION, REMOVAL OF LUMBAR 5-SACRAL 1 HARDWARE WITH C-ARM  **SPEC POP** (N/A) Day of Surgery   Precautions:   Fall, Back    PLOF: ambulated w/ SPC, pt poor historian due to drowsy state    ASSESSMENT :  Based on the objective data described below, the patient presents with decreased mobility in regards to bed mobility, transfers, gt quality and tolerance, balance, and safety due to back surgery. Decreased generalized strength of B LE, pain in back, preexisting conditions in Hx also impacting pt functional mobility. Pt rating pain on numerical pain scale pre/post and during session 10/10 however pt remained drowsy and would easily fall asleep without stimulation. Pt ed regarding mobility safety, back precautions, log roll techniques, donning/doffing/use of LSO, environmental safety and home safe techniques.   Pt so drowsy she was unable to attend fully to tasks and question retention of ed. Pt able to perform supine<>siidelying<>sit w/ min/mod Ax1-2. Safety vc required throughout session to reinforce safety. Donned/doffed LSO requiring full A from PT/OT. Pt seen w/ OT for second pair of skilled hands as needed during sessionPt BP remained low throughout session 91/51, 78/45. Pt returned to R sidelying w/ pillow support, O2 via NC and SCDs reapplied. Answered questions by pt. Pt had call button within reach. Nurse Edgardo Ledesma aware. Recommend HHPT upon hospital d/c. Patient will benefit from skilled intervention to address the above impairments. Patient's rehabilitation potential is considered to be Fair  Factors which may influence rehabilitation potential include:   []         None noted  []         Mental ability/status  [x]         Medical condition  []         Home/family situation and support systems  [x]         Safety awareness  [x]         Pain tolerance/management  []         Other:      PLAN :  Recommendations and Planned Interventions:   [x]           Bed Mobility Training             [x]    Neuromuscular Re-Education  [x]           Transfer Training                   []    Orthotic/Prosthetic Training  [x]           Gait Training                          []    Modalities  [x]           Therapeutic Exercises           []    Edema Management/Control  [x]           Therapeutic Activities            [x]    Family Training/Education  [x]           Patient Education  []           Other (comment):    Frequency/Duration: Patient will be followed by physical therapy twice daily to address goals. Discharge Recommendations: Home Health  Further Equipment Recommendations for Discharge: N/A     SUBJECTIVE:   Patient stated I want some pain medicine.     OBJECTIVE DATA SUMMARY:     Past Medical History:   Diagnosis Date    Anemia     Arthritis     back    Asthma     life long    Autoimmune disease ([facility] [address] 2013    Fibromyalgia    Bipolar 1 disorder, depressed ([facility] [address]     Bronchitis     CAD (coronary artery disease)     Chronic obstructive pulmonary disease ([facility]     2017    Chronic pain     back, all over    Depression     HTN (hypertension), benign     30 years    Hypertension     Liver disease     states had hepatitis and was treated after childbirth and a bloof transfusion 20 years ago    PTSD (post-traumatic stress disorder)      Past Surgical History:   Procedure Laterality Date    ABDOMEN SURGERY PROC UNLISTED  2008    Tummy Tuck    BREAST SURGERY PROCEDURE UNLISTED      reduction    CARDIAC SURG PROCEDURE [place] [facility] [place]       ENDOMETRIAL ABLATION, THERMAL      GASTRIC BYPASS,OBESITY,W/SM BOWEL RECONS      HX APPENDECTOMY      years ago    [address]    HX CHOLECYSTECTOMY      years ago    HX GASTRIC BYPASS      Dr Elias Powers    [address]      HX HYSTERECTOMY      years ago    HX LUMBAR DISKECTOMY  2006     Barriers to Learning/Limitations: yes;  physical  Compensate with: Verbal Cues and Tactile Cues  Home Situation:  Home Situation  Home Environment: Private residence  # Steps to Enter: 0  One/Two Story Residence: One story  Living Alone: No  Support Systems: Family member(s)  Patient Expects to be Discharged to[de-identified] Private residence  Current DME Used/Available at Home: [employer] Kevin, straight, Walker, rolling, Brace/Splint  Critical Behavior:  Neurologic State: Drowsy; Lethargic  Orientation Level: Oriented to person;Oriented to place;Oriented to situation  Cognition: Decreased attention/concentration;Decreased command following;Poor safety awareness  Safety/Judgement: Decreased awareness of environment;Decreased awareness of need for assistance;Decreased awareness of need for safety;Decreased insight into deficits  Psychosocial  Patient Behaviors: Calm; Cooperative  Skin Condition/Temp: Dry;Warm  Skin Integrity: Incision (comment)(back )  Skin Integumentary  Skin Color: Appropriate for ethnicity  Skin Condition/Temp: Dry;Warm  Skin Integrity: Incision (comment)(back )  Turgor: Non-tenting  Strength:    Strength: Generally decreased, functional  Tone & Sensation:   Tone: Normal  Sensation: Intact  Range Of Motion:  AROM: Generally decreased, functional  Posture:  Functional Mobility:  Bed Mobility:  Rolling: Minimum assistance;Assist x2; Additional time(vc)  Supine to Sit: Minimum assistance; Moderate assistance;Assist x1;Assist x2; Additional time(vc)  Sit to Supine: Minimum assistance;Assist x2; Additional time(vc)  Scooting: Contact guard assistance(vc)  Transfers:  Balance:   Wheelchair Mobility:  Ambulation/Gait Training:    Therapeutic Exercises:   Pain:  Pain level pre-treatment: 10/10   Pain level post-treatment: 10/10   Pain Intervention(s) : Medication (see MAR); Rest, Ice, Repositioning  Response to intervention: Nurse notified, See doc flow    Activity Tolerance:   Fair -  Please refer to the flowsheet for vital signs taken during this treatment. After treatment:   []         Patient left in no apparent distress sitting up in chair  [x]         Patient left in no apparent distress in bed  [x]         Call bell left within reach  [x]         Nursing notified  []         Caregiver present  []         Bed alarm activated  [x]         SCDs applied    COMMUNICATION/EDUCATION:   [x]         Role of Physical Therapy in the acute care setting. [x]         Fall prevention education was provided and the patient/caregiver indicated understanding. [x]         Patient/family have participated as able in goal setting and plan of care. []         Patient/family agree to work toward stated goals and plan of care. []         Patient understands intent and goals of therapy, but is neutral about his/her participation. []         Patient is unable to participate in goal setting/plan of care: ongoing with therapy staff.  []         Other:     Thank you for this referral.  [facility], PT   Time Calculation: 33 mins      Eval Complexity: History: HIGH Complexity :3+ comorbidities / personal factors will impact the outcome/ POC Exam:MEDIUM Complexity : 3 Standardized tests and measures addressing body structure, function, activity limitation and / or participation in recreation  Presentation: MEDIUM Complexity : Evolving with changing characteristics  Clinical Decision Making:High Complexity    Overall Complexity:MEDIUM

## 2020-08-20 NOTE — H&P
History and Physical        Patient: Jacquelyn Mart               Sex: female          DOA: (Not on file)         YOB: 1960      Age:  62 y.o.        LOS:  LOS: 0 days        HPI:     Jacquelyn Mart is a 62 y.o. female who has a history of back and lower extremity pain. Their pain is typically across the lower back and travels into the bilaterally lower extremity down the lateral aspect of the leg. She has numbness/tingling in the same distribution of their pain. She denies weakness in the bilateral lower extremity. Their pain is worse with ambulation and better at rest. She has failed conservative care including anti-inflammatories, analgesics, physical therapy and injections. Their pain affects their activities of daily living and would like to move forward with surgical intervention.        Past Medical History:   Diagnosis Date    Anemia     Arthritis     back    Asthma     life long    Autoimmune disease ([facility] [address] 2013    Fibromyalgia    Bipolar 1 disorder, depressed ([facility] [address]     Bronchitis     CAD (coronary artery disease)     Chronic obstructive pulmonary disease ([facility] [address]     2017    Chronic pain     back, all over    Depression     HTN (hypertension), benign     30 years    Hypertension     Liver disease     states had hepatitis and was treated after childbirth and a bloof transfusion 20 years ago    PTSD (post-traumatic stress disorder)        Past Surgical History:   Procedure Laterality Date    [facility]BDOM[facility]ED  [facility]    BREAST SURGERY PROCEDURE UNLISTED      reduction    CARDIAC SURG PROCEDURE UN[place] [facility] [place]    DELIVERY       ENDOMETRIAL ABLATION, THERMAL      GASTRIC BYPASS,OBESITY,W/SM BOWEL RECONS      HX APPENDECTOMY      years ago    HX  SECTION      HX CHOLECYSTECTOMY      years ago    HX GASTRIC BYPASS      Dr Damián Poe      HX HYSTERECTOMY      years ago   [facility] HX LUMBAR DISKECTOMY  2006       No family history on file. Social History     Socioeconomic History    Marital status: SINGLE     Spouse name: Not on file    Number of children: Not on file    Years of education: Not on file    Highest education level: Not on file   Tobacco Use    Smoking status: Light Tobacco Smoker     Packs/day: 1.00    Smokeless tobacco: Never Used    Tobacco comment: advised none 24 hours pre-op    Substance and Sexual Activity    Alcohol use: Yes     Comment: rare    Drug use: No    Sexual activity: Yes       Prior to Admission medications    Medication Sig Start Date End Date Taking? Authorizing Provider   acetaminophen (TylenoL) 325 mg tablet Take  by mouth every four (4) hours as needed for Pain. Provider, Historical   albuterol (PROVENTIL HFA, VENTOLIN HFA, PROAIR HFA) 90 mcg/actuation inhaler Take  by inhalation every four (4) hours as needed for Wheezing. Provider, Historical   umeclidinium brm/vilanterol tr (ANORO ELLIPTA IN) Take  by inhalation daily. Provider, Historical   ARIPiprazole (Abilify) 2 mg tablet Take 2 mg by mouth nightly. Provider, Historical   chlorthalidone (HYGROTEN) 25 mg tablet Take 25 mg by mouth daily. Provider, Historical   chlorzoxazone (Parafon Forte DSC) 500 mg tablet Take 500 mg by mouth nightly as needed for Muscle Spasm(s). Provider, Historical   clobetasoL (TEMOVATE) 0.05 % ointment Apply  to affected area two (2) times daily as needed for Skin Irritation. Provider, Historical   clopidogreL (Plavix) 75 mg tab Take 75 mg by mouth daily. Provider, Historical   Omeprazole delayed release (PRILOSEC D/R) 20 mg tablet Take 20 mg by mouth two (2) times a day. Provider, Historical   potassium chloride SA (MICRO-K) 10 mEq capsule Take 10 mEq by mouth daily. Provider, Historical   pregabalin (Lyrica) 300 mg capsule Take 300 mg by mouth two (2) times a day.  Indications: disorder characterized by stiff, tender & painful muscles, repeated episodes of anxiety    Provider, Historical   OTHER Indications: Nicotene patch daily    Provider, Historical   atorvastatin (LIPITOR) 20 mg tablet Take 20 mg by mouth nightly. Provider, Historical   aspirin 81 mg chewable tablet Take 81 mg by mouth daily. Provider, Historical   albuterol (PROVENTIL VENTOLIN) 2.5 mg /3 mL (0.083 %) nebulizer solution by Nebulization route every four (4) hours as needed for Wheezing. Provider, Historical   fluticasone (FLONASE) 50 mcg/actuation nasal spray 2 Sprays by Both Nostrils route two (2) times a day. Provider, Historical   furosemide (LASIX) 40 mg tablet Take 40 mg by mouth every other day. Indications: visible water retention    Provider, Historical   metoprolol tartrate (LOPRESSOR) 50 mg tablet Take 50 mg by mouth two (2) times a day. Other, MD Sánchez   losartan (COZAAR) 100 mg tablet Take 100 mg by mouth daily. OtherSánchez MD   lamoTRIgine (LAMICTAL) 100 mg tablet Take 150 mg by mouth two (2) times a day. Indications: DEPRESSION ASSOCIATED WITH BIPOLAR DISORDER    Other, MD Sánchez   clonazePAM (KLONOPIN) 0.5 mg tablet Take 1 mg by mouth three (3) times daily as needed. Indications: usually takes once    Sánchez Lopez MD   gabapentin (NEURONTIN) 800 mg tablet Take 300 mg by mouth two (2) times a day. Indications: can take up to 3 capsules 3 times a day    Provider, Historical       Allergies   Allergen Reactions    Effexor [Venlafaxine] Nausea and Vomiting    Hydrocodone Nausea and Vomiting       Review of Systems  A comprehensive review of systems was negative except for that written in the History of Present Illness. Physical Exam:      There were no vitals taken for this visit.     Physical Exam:  General: Alert and Oriented X 3  Lungs: Clear to ausculation bilaterally  Cardiovascular: Regular Rate and Rhythm, without murmur  Abdomen: Soft, nontender with positive bowel sounds in all four quadrants  Gential/Rectal: deferred  Musculoskeletal: The paitent has full range of motion of the lumbar spine in flexion, extension and side bending bilaterally. The patient has pain with flexion or extension. There is not pain with internal and external rotation of the bilaterally hip. The patient is tender across the left paralumbar, right paralumbar muscles. The patient is neurologically intact in the lower extremities. There is a Negative straight leg raise. His pulses are 2+. Calves are nontender. Radiographic evaluation show Lumbar DDD at L3-L5 with previous fusion at L5-S1      Labs Reviewed: All lab results for the last 24 hours reviewed. Assessment/Plan     Principal Problem:    Lumbar spinal stenosis (8/19/2020)    Active Problems:    HTN (hypertension), benign ()      Hyperlipidemia (3/15/2018)      Lumbar spondylosis (8/19/2020)      Radiculopathy of leg (8/19/2020)        We are going to move forward with a decompressive lumbar laminectomy and instrumented fusion at L3-L5 with possible removal of L5-S1 instrumentation  The risks vs the benefits have been discussed with the patient. They will follow-up with us in the hospital 10 days post-operatively and call with any and all concerns.

## 2020-08-20 NOTE — INTERVAL H&P NOTE
Update History & Physical 
 
The Patient's History and Physical of August 20, Chart was reviewed with the patient and I examined the patient. There was no change. The surgical site was confirmed by the patient and me. Plan:  The risk, benefits, expected outcome, and alternative to the recommended procedure have been discussed with the patient. Patient understands and wants to proceed with the procedure.  
 
Electronically signed by Cherelle Mae MD on 8/20/2020 at 8:57 AM

## 2020-08-20 NOTE — ANESTHESIA PREPROCEDURE EVALUATION
Relevant Problems   No relevant active problems       Anesthetic History   No history of anesthetic complications            Review of Systems / Medical History  Patient summary reviewed and pertinent labs reviewed    Pulmonary    COPD: moderate        Asthma : well controlled  Pertinent negatives: No sleep apnea     Neuro/Psych         Psychiatric history     Cardiovascular    Hypertension          CAD    Exercise tolerance: >4 METS     GI/Hepatic/Renal     GERD: well controlled      Liver disease  Pertinent negatives: No hiatal hernia   Endo/Other        Morbid obesity and arthritis     Other Findings              Physical Exam    Airway  Mallampati: II  TM Distance: 4 - 6 cm  Neck ROM: normal range of motion   Mouth opening: Normal     Cardiovascular    Rhythm: regular  Rate: normal         Dental      Comments: Chipped molars   Pulmonary  Breath sounds clear to auscultation               Abdominal  GI exam deferred       Other Findings            Anesthetic Plan    ASA: 3  Anesthesia type: general          Induction: Intravenous  Anesthetic plan and risks discussed with: Patient

## 2020-08-20 NOTE — PROGRESS NOTES
Problem: Self Care Deficits Care Plan (Adult)  Goal: *Acute Goals and Plan of Care (Insert Text)  Description: Initial Occupational Therapy Goals (8/20/2020) Within 7 day(s):    1. Patient will perform toilet transfer with SBA in preparation for bowel and bladder management. 2. Patient will perform bowel and bladder management with SBA for increased independence with ADLs. 3. Patient will perform UB dressing with SBA (including back brace) for increased independence with ADLs. 4. Patient will perform LB dressing with CGA & A/E PRN for increased independence with ADLs. 5. Patient will adhere to back/spinal precautions 100% of the time with 1-2 verbal cues for increased independence with ADLs. 6. Patient will utilize energy conservation techniques with 1 verbal cue(s) for increased independence with ADLs. Outcome: Progressing Towards Goal   OCCUPATIONAL THERAPY EVALUATION    Patient: Tom Forte (5 y.o. female)  Date: 8/20/2020  Primary Diagnosis: Lumbar spinal stenosis [M48.061]  Procedure(s) (LRB):  LUMBAR 3-LUMBAR 5 LAMINECTOMY, INSTRUMENTED FUSION WITH CAGES, EXPLORATION OF LUMBAR 5 - SACRAL 1 FUSION, REMOVAL OF LUMBAR 5-SACRAL 1 HARDWARE WITH C-ARM  **SPEC POP** (N/A) Day of Surgery   Precautions: Fall, Back  PLOF: pt mod I for ADLs, living in one story home    ASSESSMENT AND RECOMMENDATIONS:  Based on the objective data described below, the patient presents with BLE decreased ROM and strength affecting LE ADLs. Pt seen with PT for additional set of skilled hands. Pt reporting pain 10/10, pt very drowsy throughout session and would fall asleep intermittently. BP 98/51 supine in bed. Pt required min/mod A x2 for log rolling technique and to sit up to EOB. Pt required min A to maintain sitting balance. Pt required total A x2 to don LSO seated EOB. Pt not safe for OOB activity at this time due to decreased attention/concentration and drowsiness.  Pt returned to supine with min/mod A. BP 78/45 at end of session supine in bed; RN notified of above. Patient will benefit from skilled Occupational Therapy intervention to maximize safety/independence with ADLs at d/c; pt would benefit from addressing lower body dressing/functional mobility during next session. Education: Reviewed Back Precautions, LSO/Brace management, body mechanics, pt not verbalizing understanding at this time and requires additional education    Patient will benefit from skilled intervention to address the above impairments. Patient's rehabilitation potential is considered to be Fair  Factors which may influence rehabilitation potential include:   []             None noted  []             Mental ability/status  []             Medical condition  []             Home/family situation and support systems  [x]             Safety awareness  [x]             Pain tolerance/management  []             Other:        PLAN :  Recommendations and Planned Interventions:   [x]               Self Care Training                  [x]      Therapeutic Activities  [x]               Functional Mobility Training   []      Cognitive Retraining  [x]               Therapeutic Exercises           [x]      Endurance Activities  [x]               Balance Training                    []      Neuromuscular Re-Education  []               Visual/Perceptual Training     [x]      Home Safety Training  [x]               Patient Education                   []      Family Training/Education  []               Other (comment):    Frequency/Duration: Patient will be followed by occupational therapy 1-2 times per day/4-7 days per week to address goals. Discharge Recommendations: Home Health  Further Equipment Recommendations for Discharge: shower chair     SUBJECTIVE:   Patient stated Beatriz Malady are we doing? Eryn Luis    OBJECTIVE DATA SUMMARY:     Past Medical History:   Diagnosis Date    Anemia     Arthritis     back    Asthma     life long    Autoimmune disease ([facility] [address] 2013    Fibromyalgia Bipolar 1 disorder, depressed ([facility] [address]     Bronchitis     CAD (coronary artery disease)     Chronic obstructive pulmonary disease ([facility] [address]     2017    Chronic pain     back, all over    Depression     HTN (hypertension), benign     30 years    Hypertension     Liver disease     states had hepatitis and was treated after childbirth and a bloof transfusion 20 years ago    PTSD (post-traumatic stress disorder)      Past Surgical History:   Procedure Laterality Date    ABDOMEN SURGERY PROC UNLISTED  2008    [facility]    BREAST SURGERY PROCEDURE UNLISTED      reduction    CARDIAC SURG PROCEDURE UNLIST      3 vessel bypass at [facility] [place]- 2018    DELIVERY       ENDOMETRIAL ABLATION, THERMAL      GASTRIC BYPASS,OBESITY,W/SM BOWEL RECONS      HX APPENDECTOMY      years ago    [address] [facility] CHOLECYSTECTOMY      years ago    HX GASTRIC BYPASS      Dr Alan Garcia    [address]      HX HYSTERECTOMY      years ago    HX LUMBAR DISKECTOMY       Barriers to Learning/Limitations: yes;  physical and altered mental status (i.e.Sedation, Confusion)  Compensate with: visual, verbal, tactile, kinesthetic cues/model    Home Situation/Prior Level of Function: pt mod I for ADLs, living in one story home  Home Situation  Home Environment: Private residence  # Steps to Enter: 0  One/Two Story Residence: One story  Living Alone: No  Support Systems: Family member(s)  Patient Expects to be Discharged to[de-identified] Private residence  Current DME Used/Available at Home: Cane, straight, Walker, rolling, Brace/Splint  Tub or Shower Type: Tub/Shower combination  []  Right hand dominant   []  Left hand dominant    Cognitive/Behavioral Status:  Neurologic State: Drowsy; Lethargic  Orientation Level: Oriented to person;Oriented to place;Oriented to situation  Cognition: Decreased attention/concentration;Decreased command following;Poor safety awareness  Safety/Judgement: Decreased awareness of environment;Decreased awareness of need for assistance;Decreased awareness of need for safety;Decreased insight into deficits    Skin: lumbar incision w/ dressing/Mepilex & HemeVAC     Coordination: BUE  Coordination: Generally decreased, functional  Fine Motor Skills-Upper: Left Intact; Right Intact    Gross Motor Skills-Upper: Left Intact; Right Intact    Balance:  Sitting: Impaired; Without support    Strength: BUE  Strength: Generally decreased, functional    Tone & Sensation:BUE  Tone: Normal  Sensation: Intact    Range of Motion: BUE  AROM: Generally decreased, functional    Functional Mobility and Transfers for ADLs:  Bed Mobility:  Rolling: Minimum assistance;Assist x2; Additional time(vc)  Supine to Sit: Minimum assistance; Moderate assistance;Assist x1;Assist x2; Additional time(vc)  Sit to Supine: Minimum assistance;Assist x2; Additional time(vc)  Scooting: Contact guard assistance(vc)    ADL Assessment:  Feeding: Setup;Stand-by assistance    Oral Facial Hygiene/Grooming: Setup;Contact guard assistance    Bathing: Total assistance    Upper Body Dressing: Moderate assistance    Lower Body Dressing: Total assistance    Toileting: Total assistance    ADL Intervention:  Upper Body Dressing Assistance  Dressing Assistance: Moderate assistance  Orthotics(Brace): Moderate assistance  Hospital Gown: Moderate assistance    Cognitive Retraining  Safety/Judgement: Decreased awareness of environment;Decreased awareness of need for assistance;Decreased awareness of need for safety;Decreased insight into deficits    Pain:  Pain level pre-treatment: 10/10  Pain level post-treatment: 10/10  Pain Intervention(s): Medication administer by Nursing (see MAR); Rest, Ice, Repositioning   Response to intervention: Nurse notified, see doc flow sheet    Activity Tolerance:   Poor. Patient able to sit at EOB ~3 minute(s). Patient able to complete ADLs with intermittent rest breaks. Patient limited by pain, strength, ROM, drowsiness. Patient unsteady.      Please refer to the flowsheet for vital signs taken during this treatment. After treatment:   []  Patient left in no apparent distress sitting up in chair  []  Patient sitting on EOB  [x]  Patient left in no apparent distress in bed  [x]  Call bell left within reach  [x]  Nursing notified  []  Caregiver present  [x]  Ice applied  []  SCD's on while back in bed  [] Bed alarm activated    COMMUNICATION/EDUCATION:   Communication/Collaboration:  [x]       Role of Occupational Therapy in the acute care setting. [x]      Home safety education was provided and the patient/caregiver indicated understanding. [x]      Patient/family have participated as able in goal setting and plan of care. [x]      Patient/family agree to work toward stated goals and plan of care. []      Patient understands intent and goals of therapy, but is neutral about his/her participation. []      Patient is unable to participate in plan of care at this time. Thank you for this referral.  Linda Frey OTR/L  Time Calculation: 26 mins    Eval Complexity: History: MEDIUM Complexity : Expanded review of history including physical, cognitive and psychosocial  history ; Examination: HIGH Complexity : 5 or more performance deficits relating to physical, cognitive , or psychosocial skils that result in activity limitations and / or participation restrictions; Decision Making:HIGH Complexity : Patient presents with comorbidities that affect occupational performance.  Signifigant modification of tasks or assistance (eg, physical or verbal) with assessment (s) is necessary to enable patient to complete evaluation

## 2020-08-20 NOTE — ANESTHESIA POSTPROCEDURE EVALUATION
Post-Anesthesia Evaluation and Assessment    Cardiovascular Function/Vital Signs  Visit Vitals  /49   Pulse 81   Temp 36.3 °C (97.3 °F)   Resp 18   Ht 5' 4\" (1.626 m)   Wt 118 kg (260 lb 2 oz)   SpO2 96%   BMI 44.65 kg/m²       Patient is status post Procedure(s):  LUMBAR 3-LUMBAR 5 LAMINECTOMY, INSTRUMENTED FUSION WITH CAGES, EXPLORATION OF LUMBAR 5 - SACRAL 1 FUSION, REMOVAL OF LUMBAR 5-SACRAL 1 HARDWARE WITH C-ARM  **SPEC POP**. Nausea/Vomiting: Controlled. Postoperative hydration reviewed and adequate. Pain:  Pain Scale 1: Visual (08/20/20 1545)  Pain Intensity 1: 0 (08/20/20 1545)   Managed. Neurological Status:   Neuro (WDL): Exceptions to WDL (08/20/20 1545)   At baseline. Mental Status and Level of Consciousness: Baseline and stable. Pulmonary Status:   O2 Device: Nasal cannula (08/20/20 1535)   Adequate oxygenation and airway patent. Complications related to anesthesia: None    Post-anesthesia assessment completed. No concerns. Patient has met all discharge requirements.     Signed By: Vanessa Keating MD

## 2020-08-20 NOTE — PERIOP NOTES
Called Anesthesiology to the bedside to assess pt prior to transfer pt to the unit. Dr Jacquelyn Hook at bedside assessing the and is ok to transfer to the floor and will sign her out.

## 2020-08-20 NOTE — PERIOP NOTES
TRANSFER - OUT REPORT:    Verbal report given to Soni Dawn RN (name) on Keith Hodgkins  being transferred to [facility](unit) for routine post - op       Report consisted of patients Situation, Background, Assessment and   Recommendations(SBAR). Information from the following report(s) SBAR, Intake/Output, MAR, Recent Results and Cardiac Rhythm NSR was reviewed with the receiving nurse. Lines:   Peripheral IV 08/20/20 Right Hand (Active)   Site Assessment Clean, dry, & intact 08/20/20 1532   Phlebitis Assessment 0 08/20/20 1532   Infiltration Assessment 0 08/20/20 1532   Dressing Status Clean, dry, & intact 08/20/20 1532   Dressing Type Transparent 08/20/20 1532   Hub Color/Line Status Infusing 08/20/20 1532        Opportunity for questions and clarification was provided.       Patient transported with:   O2 @ 2 liters

## 2020-08-20 NOTE — ROUTINE PROCESS
Bedside and Verbal shift change report given to Daryl SERRATO/Rabia RN by Sadiq Solis RN.  Report included the following information SBAR, Kardex, OR Summary, Intake/Output and MAR

## 2020-08-20 NOTE — PROGRESS NOTES
1729 Patient arrives to unit at this time. Dual skin assessment completed with Angélica Franco RN, no abnormalities noted besides surgical incision to back, fall risk armband in place. Admission assessment completed. Patient is A/O x 4, but drowsy and arousable. Lungs coarse with wheezing heard on ausculation, radial pulses present , pedal pulses present , abdomen soft and non-distended. Bowel sounds hypoactive, 18 G IV to right hand  intact and patent infusing. No signs of phlebitis or infiltration noted. SCDs applied bilaterally. Skin warm and dry  with  mepilex dressing to back CDI, Hemovac drain in place, patent, charged and draining. Rodgers catheter in place draining clear yellow urine. Patient denies numbness/tingling. Pain 0/10. Patient was oriented to the room to include use of call bell, meal ordering, and use of incentive spirometer. Patient was given explanation of \" up for dinner\" program and has verbalized understanding. Phone and call bell left within reach. Plan of care for the day addressed with patient. Educated on pain medication availability and possible side effects as well as need to call for assistance before getting out of bed, patient verbalized understanding. 1926- On call MD paged to inform of low BP    1933- Dr. Juancho Landin returned call, advised to increase fluids to 100 ml/hr for a few hours and encourage patient to eat and drink.  If BP does not improve call back

## 2020-08-20 NOTE — OP NOTES
OPERATIVE NOTE    Patient: Amber Nieves MRN: [id number]  SSN: [id number]    YOB: 1960  Age: 62 y.o. Sex: female      Indications: This is a 62y.o. year-old female who presents with back and leg pain. She was positive for Severe Instability/DDD/Stenosis. The patient was admitted for surgery as conservative measures have failed. Date of Procedure: 8/20/2020     Preoperative Diagnosis: LUMBAR SPONDYLOSIS WITH RADICULOPATHY,LUMBOSACRAL SPONDYLOLISTHESIS    Postoperative Diagnosis: LUMBAR SPONDYLOSIS WITH RADICULOPATHY,LUMBOSACRAL SPONDYLOLISTHESIS      Procedure: L3,L4 primary laminectomy and revison laminectomy L5 for decompression. L3-5 posterolateral,interbody and instrumented fusion. Exploration of fusion L5-S1 (posterior solid), Grafting, Fluoro. Increased difficulty all components secondary to morbid obesity, retained hardware, increased surgical and retractor time. Surgeon: Bev Bellamy DO,Surgical Assistant: Padmaja Easley PA-C    Assistant: Circ-1: Prosper Mcdaniels RN  Circ-Relief: Beba Douglas RN  Physician Assistant: Flavia Gallegos (utilization documented in note)   Radiology Technician: Kwaku Rooney; RT Kathy  Scrub Tech-1: Trace Hyman  Nurse Practitioner: Larry Zamora NP  Float Staff: Heydi Ellsworth    Anesthesia: Kathryn Chowdhury    Estimated Blood Loss: 850cc with 250cc returned    Specimens: * No specimens in log *     Drains: Hemovac    Implants:   Implant Name Type Inv.  Item Serial No.  Lot No. LRB No. Used Action   GRAFT BNE RECOMB HUM INFUS SM --  - [id number]  GRAFT BNE RECOMB HUM INFUS SM --   MEDTRONIC SOFAMOR DANEK [id number] N/A 1 Implanted   SPACER SIZE 22MM X 12MM    MEDTRONIC SOFAMOR DANEK [id number] N/A 1 Implanted   SPACER SIZE 22MM X 11MM    MEDTRONIC SOFAMOR DANEK [id number] N/A 1 Implanted   SCR SET SOLARA 30 4.75MM TI --  - S0  SCR SET SOLARA 30 4.75MM TI --  0 MEDTR[employer] SOFAMOR DANEK  N/A 8 Implanted   SERG SPNE CRV COCR [id number] -- SOLERA - S0  SERG SPNE CRV COCR [id number] -- SOLERA 0 [employer] SOFAMOR DANEK  N/A 1 Implanted   SERG SPNE CRV COCR [id number] -- SOLERA - S0  SERG SPNE CRV COCR [id number] -- SOLERA 0 [employer]TR[employer]IC SOFAMOR DANEK  N/A 1 Implanted   SCR SPNE MULTAXL MAS 6.5X40MM -- SOLERA - S0  SCR SPNE MULTAXL MAS 6.5X40MM -- SOLERA 0 [employer]IC SOFAMOR DANEK  N/A 3 Implanted   SCR SPNE MULTAXL MAS 6.5X45MM -- SOLERA - S0  SCR SPNE MULTAXL MAS 6.5X45MM -- SOLERA 0 MEDTR[employer]IC SOFAMOR DANEK  N/A 1 Implanted   SCR SPNE MULTAXL MAS 6.5X35MM -- SOLERA - S0  SCR SPNE MULTAXL MAS 6.5X35MM -- SOLERA 0 MEDTR[employer]IC SOFAMOR DANEK  N/A 1 Implanted   SCR SPNE MULTAXL MAS 8.5X40MM -- SOLERA - S0  SCR SPNE MULTAXL MAS 8.5X40MM -- SOLERA 0 MED[employer]IC SOFAMOR DANEK  N/A 2 Implanted   SCR SPNE MULTAXL MAS 7.5X40MM -- SOLERA - S0  SCR SPNE MULTAXL MAS 7.5X40MM -- SOLERA 0 MEDTRONIC SOFAMOR DANEK  N/A 1 Implanted       Complications: None; patient tolerated the procedure well. Procedure: The patent was greeted by anesthesia and taken to the operative suite where the patient underwent general endotracheal anesthesia. The patient was positioned in the prone position on a standard radiolucent Samuel spine table. A alan catheter was placed. The lumbar spine was sterilely prepped and draped in the standard fashion. Flouroscopy confirmed the appropriate segments and an incision was made over the posterior spinous processes. The paraspinal musculature was elevated with electrocautery. The upper facet capsule of the surgical levels was left intact and the remaining facet capsules for the surgical levels were removed and the facets decorticated posteriorly. The transverse processes and posterolateral bone was exposed to prepare for posterolateral fusion. Complete or partial spinous process resections were performed at  L-3-L-5.  A high speed bur was utilized to outline the laminectomy site and a complete decompressive lumbar laminectomy was subsequently performed secondary to spinal stenosis. Complete or partial laminectomy was performed at  L-3-L-5. .  This was revision laminectomy of L5. The fusion at L5-S1 was found to be solid but with this patients morbid obesity and imbalance I felt it would be better to manintain support to the pelvis and replaced the old instrumentation after removal with new larger instrumentation. Aggressive medial facetectomies were performed at all levels with partial or complete facetectomies as needed to decompress the exiting spinal nerve roots. Foraminotomy was performed over each exiting nerve root bilaterally to assure neurologic decompression. At the completion of the decompression there was no evidence of residual neurological encroachment at any level. A complete discectomy was subsequently performed for interbody fusion from a trans-foraminal approach at L-4-L-5. All disc material was removed. All cartilage was removed from the endplates with disc space india and ring curettes. Four cc's of local bone was packed at the interbody fusion levels as grafting material.  An PEEK/Titanium interbody cage of appropriate length and height was placed in oblique position between the superior and interior endplate of the  I-[id number]-[id number] space for disc height restoration, neural foraminal expansion and lordosis correction as well as interbody fusion. This process was repeated at L3-4 for interbody fusion and cage. .All of these were accomplished and X-rays confirmed excellent position of the interbody fusion cages without abnormalities. Attention was turned to pedicle screw instrumentation. Erik Dixon was utilized to palpate the superior, inferior, and medial wall of each pedicle from the spinal canal.  A high speed bur was utilized to gain lateral entrance into the pedicle and an awl was passed through the pedicle into the vertebral bone.   A ball tipped probe was utilized to palpate the internal anatomy of each pedicle including the superior, inferior, medial and lateral internal wall of each pedicle as well as a bony bottom and lateral wall of each vertebrae. An appropriate length and diameter Medtronic Solera Midline Cortical pedicle screw was placed and confirmed both radiographicaly and visually to be in excellent position without neurologic encroachment or pedicle wall breach. This was again performed at  L-3-L-5 (with extension to the pelvis from previous fusion). A bone marrow aspiration was performed from the right iliac crest with a bone marrow aspiration needle and mixed with Orthoblend DBM structural grafts and allowed to sit on the back table for absorption. The transverse processes  and posterolateral bone was decorticated with a high speed bur. The tissues were irrigated with 3000 ml of sterile saline with bacitracin utilizing pulsatile lavage. Subsequently, the remainder of the patients local bone from decompression was placed between the posterolateral structures for posterolateral fusion between  L-3-L-5. The Orthoblend DBM structural grafts were packed as a bone graft extender with the local bone and a large bone graft volume was noted. Pre-bent lordotic rods were placed into the polyaxial heads of the pedicle screw instrumentation and locked into position with locking caps and a torque limiting screwdriver. A secondary evaluation of each neural foramina was performed and no residual or post instrumentation stenosis was noted. All bleeding was cauterized with bipolar electrocautery or gel-foam hemostatic agents. A deep hemovac drain was placed. My Physician Assistant was utilized as a co-surgeon during this case to include dissection, suction, nerve root retraction, Dural retraction, irrigation, graft placement and final closure of surgical incision. This assistance was instrumental to the safety and success of this surgical case.   Final X-rays confirmed excellent position of the spinal intsrumentation without abnormality. The fascia was re-approximated with 1-0 Vicryl, the skin edges were re-approximated with 2-0 Vicryl and the skin was closed with a running 3-0 Monocryl. A layer of Dermabond Prineo skin sealant was placed as well as a Mepalex dressing. The patient recovered from anesthesia and was transferred to the post-anesthesia care unit in stable condition.   Jane Duran MD  8/20/2020  1:57 PM

## 2020-08-20 NOTE — PERIOP NOTES
Pt continues to fall asleep with snoring. Easily awakened. She states she took her Klonopin this morning to help her relax.

## 2020-08-20 NOTE — PERIOP NOTES
Reviewed PTA medication list with patient/caregiver and patient/caregiver denies any additional medications. Patient admits to having a responsible adult care for them for at least 24 hours after surgery.     Pharm information verified for post op RXs. Permission granted by patient for a dual skin assessment. Dual skin assessment completed by Kendrick Sung RN and Alida PEOPLES.

## 2020-08-21 ENCOUNTER — APPOINTMENT (OUTPATIENT)
Dept: GENERAL RADIOLOGY | Age: 60
DRG: 453 | End: 2020-08-21
Attending: FAMILY MEDICINE
Payer: MEDICARE

## 2020-08-21 PROBLEM — A41.9 SEPSIS (HCC): Status: ACTIVE | Noted: 2020-08-21

## 2020-08-21 PROBLEM — Z98.1 S/P LUMBAR FUSION: Status: ACTIVE | Noted: 2020-08-21

## 2020-08-21 PROBLEM — N17.9 AKI (ACUTE KIDNEY INJURY) (HCC): Status: ACTIVE | Noted: 2020-08-21

## 2020-08-21 PROBLEM — N39.0 UTI (URINARY TRACT INFECTION): Status: ACTIVE | Noted: 2020-08-21

## 2020-08-21 LAB
ALBUMIN SERPL-MCNC: 2.5 G/DL (ref 3.4–5)
ALBUMIN/GLOB SERPL: 1 {RATIO} (ref 0.8–1.7)
ALP SERPL-CCNC: 103 U/L (ref 45–117)
ALT SERPL-CCNC: 18 U/L (ref 13–56)
ANION GAP SERPL CALC-SCNC: 5 MMOL/L (ref 3–18)
APPEARANCE UR: CLEAR
AST SERPL-CCNC: 27 U/L (ref 10–38)
BACTERIA URNS QL MICRO: NEGATIVE /HPF
BASOPHILS # BLD: 0 K/UL (ref 0–0.1)
BASOPHILS NFR BLD: 0 % (ref 0–2)
BILIRUB SERPL-MCNC: 0.3 MG/DL (ref 0.2–1)
BILIRUB UR QL: NEGATIVE
BUN SERPL-MCNC: 27 MG/DL (ref 7–18)
BUN/CREAT SERPL: 18 (ref 12–20)
CALCIUM SERPL-MCNC: 7.8 MG/DL (ref 8.5–10.1)
CHLORIDE SERPL-SCNC: 105 MMOL/L (ref 100–111)
CO2 SERPL-SCNC: 29 MMOL/L (ref 21–32)
COLOR UR: YELLOW
CREAT SERPL-MCNC: 1.53 MG/DL (ref 0.6–1.3)
DIFFERENTIAL METHOD BLD: ABNORMAL
EOSINOPHIL # BLD: 0 K/UL (ref 0–0.4)
EOSINOPHIL NFR BLD: 0 % (ref 0–5)
EPITH CASTS URNS QL MICRO: NORMAL /LPF (ref 0–5)
ERYTHROCYTE [DISTWIDTH] IN BLOOD BY AUTOMATED COUNT: 16.9 % (ref 11.6–14.5)
GLOBULIN SER CALC-MCNC: 2.6 G/DL (ref 2–4)
GLUCOSE SERPL-MCNC: 120 MG/DL (ref 74–99)
GLUCOSE UR STRIP.AUTO-MCNC: NEGATIVE MG/DL
HCT VFR BLD AUTO: 32 % (ref 35–45)
HGB BLD-MCNC: 10.4 G/DL (ref 12–16)
HGB UR QL STRIP: ABNORMAL
KETONES UR QL STRIP.AUTO: NEGATIVE MG/DL
LACTATE SERPL-SCNC: 1.9 MMOL/L (ref 0.4–2)
LACTATE SERPL-SCNC: 2.9 MMOL/L (ref 0.4–2)
LEUKOCYTE ESTERASE UR QL STRIP.AUTO: ABNORMAL
LYMPHOCYTES # BLD: 1.3 K/UL (ref 0.9–3.6)
LYMPHOCYTES NFR BLD: 11 % (ref 21–52)
MCH RBC QN AUTO: 29.9 PG (ref 24–34)
MCHC RBC AUTO-ENTMCNC: 32.5 G/DL (ref 31–37)
MCV RBC AUTO: 92 FL (ref 74–97)
MONOCYTES # BLD: 0.7 K/UL (ref 0.05–1.2)
MONOCYTES NFR BLD: 6 % (ref 3–10)
NEUTS SEG # BLD: 9.3 K/UL (ref 1.8–8)
NEUTS SEG NFR BLD: 83 % (ref 40–73)
NITRITE UR QL STRIP.AUTO: NEGATIVE
PH UR STRIP: 5.5 [PH] (ref 5–8)
PLATELET # BLD AUTO: 216 K/UL (ref 135–420)
PMV BLD AUTO: 9 FL (ref 9.2–11.8)
POTASSIUM SERPL-SCNC: 4.2 MMOL/L (ref 3.5–5.5)
PROT SERPL-MCNC: 5.1 G/DL (ref 6.4–8.2)
PROT UR STRIP-MCNC: NEGATIVE MG/DL
RBC # BLD AUTO: 3.48 M/UL (ref 4.2–5.3)
RBC #/AREA URNS HPF: NORMAL /HPF (ref 0–5)
SODIUM SERPL-SCNC: 139 MMOL/L (ref 136–145)
SP GR UR REFRACTOMETRY: 1.02 (ref 1–1.03)
UROBILINOGEN UR QL STRIP.AUTO: 1 EU/DL (ref 0.2–1)
WBC # BLD AUTO: 11.3 K/UL (ref 4.6–13.2)
WBC URNS QL MICRO: NORMAL /HPF (ref 0–5)

## 2020-08-21 PROCEDURE — 65270000029 HC RM PRIVATE

## 2020-08-21 PROCEDURE — 81001 URINALYSIS AUTO W/SCOPE: CPT

## 2020-08-21 PROCEDURE — 74011000258 HC RX REV CODE- 258: Performed by: FAMILY MEDICINE

## 2020-08-21 PROCEDURE — 97530 THERAPEUTIC ACTIVITIES: CPT

## 2020-08-21 PROCEDURE — 77010033678 HC OXYGEN DAILY

## 2020-08-21 PROCEDURE — 85025 COMPLETE CBC W/AUTO DIFF WBC: CPT

## 2020-08-21 PROCEDURE — 36415 COLL VENOUS BLD VENIPUNCTURE: CPT

## 2020-08-21 PROCEDURE — 74011250636 HC RX REV CODE- 250/636: Performed by: PHYSICIAN ASSISTANT

## 2020-08-21 PROCEDURE — 74011250637 HC RX REV CODE- 250/637: Performed by: PHYSICIAN ASSISTANT

## 2020-08-21 PROCEDURE — 74011250636 HC RX REV CODE- 250/636: Performed by: FAMILY MEDICINE

## 2020-08-21 PROCEDURE — 99218 HC RM OBSERVATION: CPT

## 2020-08-21 PROCEDURE — 87086 URINE CULTURE/COLONY COUNT: CPT

## 2020-08-21 PROCEDURE — 83605 ASSAY OF LACTIC ACID: CPT

## 2020-08-21 PROCEDURE — 71045 X-RAY EXAM CHEST 1 VIEW: CPT

## 2020-08-21 PROCEDURE — 87040 BLOOD CULTURE FOR BACTERIA: CPT

## 2020-08-21 PROCEDURE — 74011250637 HC RX REV CODE- 250/637: Performed by: FAMILY MEDICINE

## 2020-08-21 PROCEDURE — 80053 COMPREHEN METABOLIC PANEL: CPT

## 2020-08-21 PROCEDURE — 74011250636 HC RX REV CODE- 250/636: Performed by: ORTHOPAEDIC SURGERY

## 2020-08-21 RX ORDER — ACETAMINOPHEN 500 MG
1000 TABLET ORAL
Status: DISCONTINUED | OUTPATIENT
Start: 2020-08-21 | End: 2020-08-25 | Stop reason: HOSPADM

## 2020-08-21 RX ORDER — VANCOMYCIN 2 GRAM/500 ML IN 0.9 % SODIUM CHLORIDE INTRAVENOUS
2000 ONCE
Status: COMPLETED | OUTPATIENT
Start: 2020-08-21 | End: 2020-08-21

## 2020-08-21 RX ORDER — OXYCODONE HYDROCHLORIDE 5 MG/1
10 TABLET ORAL
Status: DISCONTINUED | OUTPATIENT
Start: 2020-08-21 | End: 2020-08-23

## 2020-08-21 RX ADMIN — Medication 10 ML: at 05:24

## 2020-08-21 RX ADMIN — VANCOMYCIN HYDROCHLORIDE 2000 MG: 10 INJECTION, POWDER, LYOPHILIZED, FOR SOLUTION INTRAVENOUS at 04:26

## 2020-08-21 RX ADMIN — FAMOTIDINE 20 MG: 20 TABLET, FILM COATED ORAL at 20:47

## 2020-08-21 RX ADMIN — PIPERACILLIN AND TAZOBACTAM 3.38 G: 3; .375 INJECTION, POWDER, LYOPHILIZED, FOR SOLUTION INTRAVENOUS at 11:25

## 2020-08-21 RX ADMIN — POTASSIUM CHLORIDE 10 MEQ: 750 TABLET, EXTENDED RELEASE ORAL at 09:33

## 2020-08-21 RX ADMIN — PIPERACILLIN AND TAZOBACTAM 3.38 G: 3; .375 INJECTION, POWDER, LYOPHILIZED, FOR SOLUTION INTRAVENOUS at 22:53

## 2020-08-21 RX ADMIN — PIPERACILLIN AND TAZOBACTAM 3.38 G: 3; .375 INJECTION, POWDER, LYOPHILIZED, FOR SOLUTION INTRAVENOUS at 05:57

## 2020-08-21 RX ADMIN — LAMOTRIGINE 150 MG: 100 TABLET ORAL at 09:33

## 2020-08-21 RX ADMIN — ACETAMINOPHEN 1000 MG: 500 TABLET ORAL at 05:24

## 2020-08-21 RX ADMIN — ATORVASTATIN CALCIUM 20 MG: 20 TABLET, FILM COATED ORAL at 22:53

## 2020-08-21 RX ADMIN — SODIUM CHLORIDE, SODIUM LACTATE, POTASSIUM CHLORIDE, AND CALCIUM CHLORIDE 125 ML/HR: 600; 310; 30; 20 INJECTION, SOLUTION INTRAVENOUS at 01:20

## 2020-08-21 RX ADMIN — FAMOTIDINE 20 MG: 20 TABLET, FILM COATED ORAL at 09:33

## 2020-08-21 RX ADMIN — CEFAZOLIN SODIUM 2 G: 2 SOLUTION INTRAVENOUS at 05:17

## 2020-08-21 RX ADMIN — PREGABALIN 300 MG: 75 CAPSULE ORAL at 09:33

## 2020-08-21 RX ADMIN — VANCOMYCIN HYDROCHLORIDE 1000 MG: 1 INJECTION, POWDER, LYOPHILIZED, FOR SOLUTION INTRAVENOUS at 17:30

## 2020-08-21 RX ADMIN — PIPERACILLIN AND TAZOBACTAM 3.38 G: 3; .375 INJECTION, POWDER, LYOPHILIZED, FOR SOLUTION INTRAVENOUS at 16:31

## 2020-08-21 RX ADMIN — SODIUM CHLORIDE 500 ML: 900 INJECTION, SOLUTION INTRAVENOUS at 03:47

## 2020-08-21 RX ADMIN — ACETAMINOPHEN 1000 MG: 500 TABLET ORAL at 20:47

## 2020-08-21 RX ADMIN — OXYCODONE 10 MG: 5 TABLET ORAL at 04:14

## 2020-08-21 RX ADMIN — OXYCODONE 10 MG: 5 TABLET ORAL at 20:48

## 2020-08-21 RX ADMIN — SODIUM CHLORIDE 1000 ML: 900 INJECTION, SOLUTION INTRAVENOUS at 05:16

## 2020-08-21 NOTE — CONSULTS
Medicine Consult    Patient:  Valentino Edouard 62 y.o. female  Asked to evaluate patient by RN for Dr. Jorge A Coello patient  Primary Care Provider:  Celio Field MD  Date of Admission:  8/20/2020  Reason for Consult: hypotension, fever        Assessment/Plan     Patient Active Problem List   Diagnosis Code    HTN (hypertension), benign I10    Depression F32.9    Asthma J45.909    Bipolar 1 disorder, depressed ([facility] F31.9    PTSD (post-traumatic stress disorder) F43.10    Back pain M54.9    Aortocoronary bypass status Z95.1    3-vessel CAD I25.10    Essential hypertension I10    Hyperlipidemia E78.5    Morbid obesity ([facility] E66.01    Lumbar spinal stenosis M48.061    Lumbar spondylosis M47.816    Radiculopathy of leg M54.10    Sepsis ([facility] A41.9    ROSANGELA (acute kidney injury) ([facility] N17.9    UTI (urinary tract infection) N39.0       PLAN:    61 y/o female with postop fever and hypotension. I advised the RN to call a code sepsis overhead. STAT labs ordered as well as CXR. Lactic acid elevated at 2.9. Sepsis-likely due to urinary source  -sepsis bundle initiated  -repeat lactic acid within 4 hours  -IV fluid boluses given  -zosyn and vancomycin for broad spectrum coverage  -tylenol 1000 mg q8 hrs prn  -oxycodone 10 mg every 6 hours for pain control    UTI  -antibiotics ordered as above  -discontinue alan when able    ROSANGELA-likely due to sepsis  -fluid resuscitation in progress  -trend Cr    Tabitha Ron MD  Nocturnist    Thank you for allowing us to participate in this patient's care. HPI:   CC:  Valentino Edouard is a 62 y.o. female with past medical history significant for chronic back pain s/p L3,L4 primary laminectomy and revison laminectomy L5 for decompression. L3-5 posterolateral,interbody and instrumented fusion. Consulted for hypotension and fever overnight. She feels ill but is not having chest pain or shortness of breath.     Past Medical History:   Diagnosis Date    Anemia  Arthritis     back    Asthma     life long    Autoimmune disease ([facility] 2013    Fibromyalgia    Bipolar 1 disorder, depressed ([facility]     Bronchitis     CAD (coronary artery disease)     Chronic obstructive pulmonary disease ([facility]     2017    Chronic pain     back, all over    Depression     HTN (hypertension), benign     30 years    Hypertension     Liver disease     states had hepatitis and was treated after childbirth and a bloof transfusion 20 years ago    PTSD (post-traumatic stress disorder)      Past Surgical History:   Procedure Laterality Date    ABDOMEN SURGERY PROC UNLISTED  2008    [facility]    BREAST SURGERY PROCEDURE UNLISTED      reduction    CARDIAC SURG PROCEDURE UNLIST      3 vessel bypass at [facility]- 2018    DELIVERY       ENDOMETRIAL ABLATION, THERMAL      GASTRIC BYPASS,OBESITY,W/SM BOWEL RECONS      HX APPENDECTOMY      years ago    HX  SECTION      HX CHOLECYSTECTOMY      years ago    HX GASTRIC BYPASS      Dr Villasenor Doctor      HX HYSTERECTOMY      years ago    HX LUMBAR DISKECTOMY        Social History     Tobacco Use    Smoking status: Light Tobacco Smoker     Packs/day: 1.00    Smokeless tobacco: Never Used    Tobacco comment: advised none 24 hours pre-op    Substance Use Topics    Alcohol use: Yes     Comment: rare    Drug use: No     No family history on file. No current facility-administered medications on file prior to encounter. Current Outpatient Medications on File Prior to Encounter   Medication Sig Dispense Refill    atorvastatin (LIPITOR) 20 mg tablet Take 20 mg by mouth nightly.  aspirin 81 mg chewable tablet Take 81 mg by mouth daily.  albuterol (PROVENTIL VENTOLIN) 2.5 mg /3 mL (0.083 %) nebulizer solution by Nebulization route every four (4) hours as needed for Wheezing.  fluticasone (FLONASE) 50 mcg/actuation nasal spray 2 Sprays by Both Nostrils route two (2) times a day.  furosemide (LASIX) 40 mg tablet Take 40 mg by mouth every other day. Indications: visible water retention      metoprolol tartrate (LOPRESSOR) 50 mg tablet Take 50 mg by mouth two (2) times a day.  losartan (COZAAR) 100 mg tablet Take 100 mg by mouth daily.  lamoTRIgine (LAMICTAL) 100 mg tablet Take 150 mg by mouth two (2) times a day. Indications: DEPRESSION ASSOCIATED WITH BIPOLAR DISORDER      clonazePAM (KLONOPIN) 0.5 mg tablet Take 1 mg by mouth three (3) times daily as needed. Indications: usually takes once      gabapentin (NEURONTIN) 800 mg tablet Take 300 mg by mouth two (2) times a day. Indications: can take up to 3 capsules 3 times a day        Allergies   Allergen Reactions    Effexor [Venlafaxine] Nausea and Vomiting    Hydrocodone Nausea and Vomiting           Review of Systems  Constitutional: +fever, chills, diaphoresis, malaise, fatigue  Skin: no itching or rashes  HEENT: no ear discomfort, hearing loss, tinnitus, epistaxis or sore throat  EYES: no blurry vision, double vision or photophobia  CARDIOVASCULAR: no claudication, cp, palpitations, orthopnea, pnd or LE edema  PULMONARY: no cough, wheeze, shortness of breath or sputum production  GI: no nausea, vomiting, diarrhea, abdominal pain, melena, hematemesis or brbpr  : no dysuria, hematuria  MUSCULOSKELETAL: +back pain  ENDOCRINE: no heat/cold intolerance, polyuria or polydipsia  HEME: no easy bruising or bleeding  NEURO: no unilateral weakness, numbness, tingling or seizures      Physical Exam:      Visit Vitals  BP (!) 109/95 (BP 1 Location: Other(comment), BP Patient Position: At rest)   Pulse (!) 105   Temp (!) 100.6 °F (38.1 °C)   Resp 18   Ht 5' 4\" (1.626 m)   Wt 118 kg (260 lb 2 oz)   SpO2 96%   BMI 44.65 kg/m²     Body mass index is 44.65 kg/m².     Physical Exam:  GEN: ill appearing, uncomfortable, laying in bed in no acute distress  HEENT: atraumatic, nose normal,oropharynx clear  NECK: supple, trachea midline  EYES: conjunctiva normal, lids with out lesions  HEART: tachycardic with out m/r/g, pmi nondisplaced, pulses 2+ distally  LUNGS: equal chest wall expansion, cta bl with out wheezes/rales or rhonchi  AB: soft, +BS, nt/nd no organomegaly  NEURO: alert, awake and oriented x3, gait not assessed  SKIN: dry, intact, warm no breakdown noted        Laboratory Studies:    Recent Results (from the past 24 hour(s))   HGB & HCT    Collection Time: 08/20/20  4:50 PM   Result Value Ref Range    HGB 11.4 (L) 12.0 - 16.0 g/dL    HCT 35.7 35.0 - 45.0 %   LACTIC ACID    Collection Time: 08/21/20  3:56 AM   Result Value Ref Range    Lactic acid 2.9 (HH) 0.4 - 2.0 MMOL/L   CBC WITH AUTOMATED DIFF    Collection Time: 08/21/20  3:56 AM   Result Value Ref Range    WBC 11.3 4.6 - 13.2 K/uL    RBC 3.48 (L) 4.20 - 5.30 M/uL    HGB 10.4 (L) 12.0 - 16.0 g/dL    HCT 32.0 (L) 35.0 - 45.0 %    MCV 92.0 74.0 - 97.0 FL    MCH 29.9 24.0 - 34.0 PG    MCHC 32.5 31.0 - 37.0 g/dL    RDW 16.9 (H) 11.6 - 14.5 %    PLATELET 559 682 - 559 K/uL    MPV 9.0 (L) 9.2 - 11.8 FL    NEUTROPHILS 83 (H) 40 - 73 %    LYMPHOCYTES 11 (L) 21 - 52 %    MONOCYTES 6 3 - 10 %    EOSINOPHILS 0 0 - 5 %    BASOPHILS 0 0 - 2 %    ABS. NEUTROPHILS 9.3 (H) 1.8 - 8.0 K/UL    ABS. LYMPHOCYTES 1.3 0.9 - 3.6 K/UL    ABS. MONOCYTES 0.7 0.05 - 1.2 K/UL    ABS. EOSINOPHILS 0.0 0.0 - 0.4 K/UL    ABS.  BASOPHILS 0.0 0.0 - 0.1 K/UL    DF AUTOMATED     METABOLIC PANEL, COMPREHENSIVE    Collection Time: 08/21/20  3:56 AM   Result Value Ref Range    Sodium 139 136 - 145 mmol/L    Potassium 4.2 3.5 - 5.5 mmol/L    Chloride 105 100 - 111 mmol/L    CO2 29 21 - 32 mmol/L    Anion gap 5 3.0 - 18 mmol/L    Glucose 120 (H) 74 - 99 mg/dL    BUN 27 (H) 7.0 - 18 MG/DL    Creatinine 1.53 (H) 0.6 - 1.3 MG/DL    BUN/Creatinine ratio 18 12 - 20      GFR est AA 42 (L) >60 ml/min/1.73m2    GFR est non-AA 35 (L) >60 ml/min/1.73m2    Calcium 7.8 (L) 8.5 - 10.1 MG/DL    Bilirubin, total 0.3 0.2 - 1.0 MG/DL    ALT (SGPT) 18 13 - 56 U/L    AST (SGOT) 27 10 - 38 U/L    Alk.  phosphatase 103 45 - 117 U/L    Protein, total 5.1 (L) 6.4 - 8.2 g/dL    Albumin 2.5 (L) 3.4 - 5.0 g/dL    Globulin 2.6 2.0 - 4.0 g/dL    A-G Ratio 1.0 0.8 - 1.7     URINALYSIS W/ RFLX MICROSCOPIC    Collection Time: 08/21/20  4:00 AM   Result Value Ref Range    Color YELLOW      Appearance CLEAR      Specific gravity 1.017 1.005 - 1.030      pH (UA) 5.5 5.0 - 8.0      Protein Negative NEG mg/dL    Glucose Negative NEG mg/dL    Ketone Negative NEG mg/dL    Bilirubin Negative NEG      Blood TRACE (A) NEG      Urobilinogen 1.0 0.2 - 1.0 EU/dL    Nitrites Negative NEG      Leukocyte Esterase SMALL (A) NEG     URINE MICROSCOPIC ONLY    Collection Time: 08/21/20  4:00 AM   Result Value Ref Range    WBC 10 to 15 0 - 5 /hpf    RBC 2 to 4 0 - 5 /hpf    Epithelial cells FEW 0 - 5 /lpf    Bacteria Negative NEG /hpf     CXR pending

## 2020-08-21 NOTE — PROGRESS NOTES
Pharmacy Clinical Services: Vancomycin   SCr increased ==> 1.53 today from 1.15 ( 8/17/20 )   CrCl ~ 50 ml/min   WBC = 11.3  Last temp = 99.2  Sepsis likely urinary source   Vanc 2 gm LD given this am 8/21/20 @ 0400  Due to SCr increase from originally calculated dose, will change maintenance dose from Vancomycin 1250 mg IV q12h to 1000 mg IV q12h   Plan for trough around 4th maintenance dose.    Pharmacy to continue to follow and adjust dose as necessary  [address] [place] [address] [phone]

## 2020-08-21 NOTE — PROGRESS NOTES
1157: Attempted to treat pt, BP 93/57, pt unable to stay awake to communicate, will follow up again later for PT.    1235: 2nd attempt, pt sitting EOB getting ready to eat lunch. Pt is very groggy, will follow up again after lunch.

## 2020-08-21 NOTE — PROGRESS NOTES
Transition of Care (JOSE) Plan:     Home with [facility] in [address]    Chart reviewed, met with pt in room. Pt planning discharge home, states son will pick her up at discharge and is able to assist once home. FOC offered, pt chose [facility] in [phone] for follow up; referral placed with CMS. Pt has RW for home, requesting 3:1 and wheelchair d/t distance from parking lot to lobby of her apartment. Pt may need to borrow wheelchair as insurance may not cover and MD does not order post op. 3:1 order forwarded to [facility] [employer] [facility] [employer] [facility] Home for delivery to pt home, pt aware it may not be delivered today. No other needs at this time, CM following for changes to dc plan. If SNF/rehab needed, pt likely remaining until Monday as she will need services coordinated including insurance approval.       JOSE Transportation:   How is patient being transported at discharge? Family/Friend      When? Once cleared by Therapy between 12-2pm     Is transport scheduled? N/A      Follow-up appointment and transportation:   PCP/Specialist?  See AVS for Appointment         Who is transporting to the follow-up appointment? Family/Friend      Is transport for follow up appointment scheduled? N/A    Communication plan (with patient/family): Who is being called? Patient or Next of Kin? Responsible party? Patient      What number(s) is to be used? See Facesheet      What service provider is calling for [facility] services? When are they calling? 24-48 hours following discharge    Readmission Risk? (Green/Low; Yellow/Moderate; Red/High):  Green    Care Management Interventions  PCP Verified by CM:  Yes  Transition of Care Consult (CM Consult): [address]: No  Reason Outside [place]: Physician referred to specific agency([facility])  Discharge Durable Medical Equipment: Yes  Physical Therapy Consult: Yes  Occupational Therapy Consult: Yes  Current Support Network: Own Home  Confirm Follow Up Transport: Family  The Plan for Transition of Care is Related to the Following Treatment Goals : HH  The Patient and/or Patient Representative was Provided with a Choice of Provider and Agrees with the Discharge Plan?: Yes  Freedom of Choice List was Provided with Basic Dialogue that Supports the Patient's Individualized Plan of Care/Goals, Treatment Preferences and Shares the Quality Data Associated with the Providers?: Yes  Discharge Location  Discharge Placement: Home with home health

## 2020-08-21 NOTE — PROGRESS NOTES
Progress Note      Patient: Christen Deluca               Sex: female          DOA: 8/20/2020         YOB: 1960      Age:  62 y.o.        LOS:  LOS: 0 days     Procedure: Procedure(s):  LUMBAR 3-LUMBAR 5 LAMINECTOMY, INSTRUMENTED FUSION WITH CAGES, EXPLORATION OF LUMBAR 5 - SACRAL 1 FUSION, REMOVAL OF LUMBAR 5-SACRAL 1 HARDWARE WITH C-ARM  **SPEC POP**            Subjective:     Christen Deluca is a 62 y.o. female who c/o moderate joint symptoms, fever, and hypotension status post lumbar fusion. She is currently being treated for possible sepsis from urinary source with vancomycin and zosyn per hospitalist.       Objective:      Visit Vitals  /55 (BP 1 Location: Right arm, BP Patient Position: At rest)   Pulse (!) 104   Temp 100.2 °F (37.9 °C)   Resp 20   Ht 5' 4\" (1.626 m)   Wt 118 kg (260 lb 2 oz)   SpO2 97%   BMI 44.65 kg/m²       Physical Exam:  A&O x3  HF/GS/QUADS/EHL/TA 5/5 bilateral  Calves nontender      Dressing: C/D/I    Intake and Output:  Current Shift:  No intake/output data recorded. Last three shifts:  08/19 1901 - 08/21 0700  In: 9126 [P.O.:96; I.V.:8780]  Out: 3060 [Urine:2250; Drains:60]    Drain Output:    Lab/Data Reviewed: All lab results for the last 24 hours reviewed. Medications Reviewed    Continued hospitalization is indicated due to continue therapy needs, continued antibiotic treatment d/t suspected sepsis.        Assessment/Plan     Principal Problem:    Lumbar spinal stenosis (8/19/2020)    Active Problems:    HTN (hypertension), benign ()      Hyperlipidemia (3/15/2018)      Lumbar spondylosis (8/19/2020)      Radiculopathy of leg (8/19/2020)      Sepsis ([address] (8/21/2020)      ROSANGELA (acute kidney injury) ([address] (8/21/2020)      UTI (urinary tract infection) (8/21/2020)        S/p Lumbar fusion   Change dressing, Continue O2 with goal SpO2 >90%  OOB today with PT BID with walker and Brace if tolerable  D/c drain when less than 50cc, D/c alan  Continue IV fluids and antibiotics- Vancomycin and Zosyn  Will defer vanc trough and continued lab monitoring re: UTI to hospitalist          Home versus Rehab dependent upon hospital course.

## 2020-08-21 NOTE — PROGRESS NOTES
Problem: Falls - Risk of  Goal: *Absence of Falls  Description: Document Rebbecca Persons Fall Risk and appropriate interventions in the flowsheet. Outcome: Progressing Towards Goal  Note: Fall Risk Interventions:  Mobility Interventions: Utilize walker, cane, or other assistive device, OT consult for ADLs, Patient to call before getting OOB, PT Consult for assist device competence         Medication Interventions: Patient to call before getting OOB, Teach patient to arise slowly    Elimination Interventions: Call light in reach              Problem: Patient Education: Go to Patient Education Activity  Goal: Patient/Family Education  Outcome: Progressing Towards Goal     Problem: Patient Education: Go to Patient Education Activity  Goal: Patient/Family Education  Outcome: Progressing Towards Goal     Problem: Pressure Injury - Risk of  Goal: *Prevention of pressure injury  Description: Document Kannan Scale and appropriate interventions in the flowsheet. Outcome: Progressing Towards Goal  Note: Pressure Injury Interventions:             Activity Interventions: Increase time out of bed    Mobility Interventions: PT/OT evaluation, Pressure redistribution bed/mattress (bed type)    Nutrition Interventions: Document food/fluid/supplement intake                     Problem: Patient Education: Go to Patient Education Activity  Goal: Patient/Family Education  Outcome: Progressing Towards Goal

## 2020-08-21 NOTE — PROGRESS NOTES
Problem: Mobility Impaired (Adult and Pediatric)  Goal: *Acute Goals and Plan of Care (Insert Text)  Description: In 1-4 days pt will be able to perform:  STG  1. Bed mobility:  Demonstrate proper log-roll technique for safe initiation of rolling for OOB activities. 2.  Supine to sit to supine S with HR for meals. 3.  Sit to stand to sit S with RW/LSO in prep for ambulation. LT.  Gait:  Ambulate 150ft S with RW/LSO, for home/community mobility. 2.  Activity tolerance: Tolerate up in chair 30 minutes-1 hour for ADL's.  3.  Patient/Family Education:  Patient/family to be independent with HEP for follow-up care and safe discharge. Outcome: Progressing Towards Goal   PHYSICAL THERAPY TREATMENT    Patient: Charlene Ibanez (5 y.o. female)  Date: 2020  Diagnosis: Lumbar spinal stenosis [M48.061]   Lumbar spinal stenosis  Procedure(s) (LRB):  LUMBAR 3-LUMBAR 5 LAMINECTOMY, INSTRUMENTED FUSION WITH CAGES, EXPLORATION OF LUMBAR 5 - SACRAL 1 FUSION, REMOVAL OF LUMBAR 5-SACRAL 1 HARDWARE WITH C-ARM  **SPEC POP** (N/A) 1 Day Post-Op  Precautions: Fall, Back  PLOF:     ASSESSMENT:  Pt in R side lying upon entering room. No assistance needed to sit up EOB with use of bed rail. Pt gown and bed pad saturated in blood, hemovac disconnected at location closest to pt bandage, not sure how long this has been disconnected and therefore did not reconnect. Changed pt gown while sitting EOB. Pt able to stand and take side steps up to [facility]. Bed pad changed but the drain continues to drip. And returned to sitting EOB. Notified nurse about hemovac. Pt left sitting EOB for bandage change. Progression toward goals:   []      Improving appropriately and progressing toward goals  [x]      Improving slowly and progressing toward goals  []      Not making progress toward goals and plan of care will be adjusted     PLAN:  Patient continues to benefit from skilled intervention to address the above impairments.   Continue treatment per established plan of care. Discharge Recommendations:  Home Health  Further Equipment Recommendations for Discharge:  rolling walker     SUBJECTIVE:   Patient stated César García.     OBJECTIVE DATA SUMMARY:   Critical Behavior:  Neurologic State: Drowsy  Orientation Level: Oriented X4  Cognition: Appropriate decision making, Appropriate for age attention/concentration, Follows commands  Safety/Judgement: Decreased awareness of environment, Decreased awareness of need for assistance, Decreased awareness of need for safety, Decreased insight into deficits  Functional Mobility Training:  Bed Mobility:    Supine to Sit: Supervision  Transfers:  Sit to Stand: Stand-by assistance;Contact guard assistance  Stand to Sit: Supervision  Balance:  Sitting: Intact  Standing: Intact; With support   Ambulation/Gait Training:  Distance (ft): 4 Feet (ft)  Assistive Device: Walker, rolling(bariatric)  Ambulation - Level of Assistance: Contact guard assistance;Stand-by assistance  Speed/Lucy: Slow  Step Length: Right shortened;Left shortened  Pain:  Pain level pre-treatment: 5/10  Pain level post-treatment: 5/10   Pain Intervention(s): Medication (see MAR); Rest, Ice, Repositioning   Response to intervention: Nurse notified, See doc flow    Activity Tolerance:   Fair  Please refer to the flowsheet for vital signs taken during this treatment. After treatment:   [] Patient left in no apparent distress sitting up in chair  [x] Patient left in no apparent distress in bed  [x] Call bell left within reach  [x] Nursing notified  [] Caregiver present  [] Bed alarm activated  [] SCDs applied      COMMUNICATION/EDUCATION:   [x]         Role of Physical Therapy in the acute care setting. [x]         Fall prevention education was provided and the patient/caregiver indicated understanding. [x]         Patient/family have participated as able in working toward goals and plan of care.   [x]         Patient/family agree to work toward stated goals and plan of care. []         Patient understands intent and goals of therapy, but is neutral about his/her participation.   []         Patient is unable to participate in stated goals/plan of care: ongoing with therapy staff.  []         Other:        Adra Dress, PTA   Time Calculation: 30 mins

## 2020-08-21 NOTE — PROGRESS NOTES
2005 Assessment completed. patientpatient is drowsy, sleeps in and out, but alert and oriented X4.skin is warm and color is appropriate to race. Expiratory and inspiratory wheezing on ascultation. symmetrical chest movement. Nasal canula with oxygen on 2 L.peripheral pulses are present X 4 but very faint. Has SCD's on bilateral.IV to right hand  Is clean dry and intact. dressing to the lower-mid back is clean dry and intact,has optifaom mepilex dressing, Hemovac is patent and draining 50 ml, alan is in place and has 300 ml of urine,pateint was able to get incentive spirometer to  1000 . Patient verbalized pain at 10/10 but unable to give her anything for pain because her blood pressure has been very LOW. Bed at the lowest position, call bell, phone and personal items were within reach. 2332:shift assessment completed. Patient resting in bed, dressing on the mid lower back is clean dry and intact. IV is on the right arm is clean dry and intact . Blood pressure is 98/56 improving gradually. phone, personal items and call light are within reach. bed is at the lowest position    01:11am spoke to Dr Miguelina Dick about patient blood pressure and increased pulse rate. Dr. Miguelina Dick suggested to increase her IV fluids from 100 ml/hr to 125 ml/hr for 2 hrs and if patients Blood pressure and pulse  Does not improve. I should put call a hospitalist to check patient to make sure nothing is going on with patient. And encourage patient to drink more fluids      01:15: IV fluids increased to 125 ml/hr. 02:35 Reassessment completed. No change noted see nursing flow sheet. Patient is drinking more.    [de-identified] am called for a consult Doctor Dimitry Billy, per Doctor Solitario's request after the 2 hour monitor. Vital signs are: Temp 100.5, Pulse 105,Resp. Rate 16,O2 96 BP 96/64  [id number]    03:30 Dr Dimitry Billy called back and instructed to call code sepsis.  At 03:40 code sepsis was called,labs were drawn, urine sample was sent was drawn around 04:00am and was sent to the lab.    04:44 Robin Turcios  From laboratory called with critical lab value and lactic acid was 2.9. called Dr. Samuel Krishnamurthy at 04:48am and she called back at 04:49am and she sent in orders for patient which I ackknowleged    0649am MAP is @ 69 and at 7 :22am MAP is @ 67    07:30am Bedside and Verbal shift change report given to Nannette Lewis (oncoming nurse) by ALESSANDRA Villanueva RN(offgoing nurse). Report included the following information SBAR, MAR and Recent Results.

## 2020-08-21 NOTE — ROUTINE PROCESS
Rounded on Pt for MEWS of 3 and hypotension. Pt drowsy with weak radial pulse and thready pedal pulses. Subjectively Pt stated she feels \"like shit. \" Pt denied nausea or chest pain, stated her only symptom was back pain from her recent surgery. Urine in alan recent per RN. Pt BP cuff loose around left forearm. Tightened BP cuff and received systolic reading in the 15D. Retrieved larger cuff and placed on left upper arm, ensuring proper fit. Reading remained in the 90's.

## 2020-08-21 NOTE — PROGRESS NOTES
0730 - Bedside shift report received from [address]. Assumed care of patient. Patient noted resting in bed at this time. Call light in reach. 4217 - Assessment complete. Patient drowsy but easily arousable and oriented x 4. Cap refills < 3 sec. Pulses palpable and equal bilaterally. Lung sounds clear bilaterally. Respirations even and unlabored. On 2L of O2 via nasal cannula. Abd soft and nontender. Bowel sounds active to all 4 quads. Rodgers intact and draining. Skin warm and dry. Drsg to lower back noted CDI. Hemovac drain intact and draining. Back brace at bedside. IV to left FA intact and infusing. IV to right hand intact and capped. SCD's to BLE. Patient resting in bed with call light in reach. 8116 - Assisted patient to edge of bed for breakfast. Call light in reach. 1030 - Rodgers catheter removed as per orders. 1115 - Patient assisted onto bedside commode, unable to void, and returned to bed.    1425 - Patient assisted from chair to bed. Call light in reach. 1615 - Patient assisted onto bedside commode, voided without difficulty, and returned to edge of bed. Patient eating dinner at this time with call light in reach. 1900 - Patient assisted to bedside commode, voided without difficulty, and returned to bed. Call light in reach. 2025 - Call out to Dr. Satish Vdiales in regards to lyrica and lamictal. Awaiting return call. 2041 - Return call received. Updated Dr. Satish Vidales on patient status (drowsiness, elevating temp, and tachycardia). Stated may hold lamictal and lyrica and give oxycodone. Ordered to give PRN tylenol for temp of 99.7 F. Patient assisted onto bedside commode, voided, and returned to bed. Call light in reach. 2048 - Oxycdone and tylenol administered.

## 2020-08-21 NOTE — PROGRESS NOTES
Rounded on patient. Patient does not feel good at all. Gave patient a book about caring for her back after surgery. Handed the patient her call light and showed her the TV channel guide in the back of the book. Patient does not want any assistance at present. Side rails up and bedside table in reach.

## 2020-08-22 ENCOUNTER — APPOINTMENT (OUTPATIENT)
Dept: CT IMAGING | Age: 60
DRG: 453 | End: 2020-08-22
Attending: INTERNAL MEDICINE
Payer: MEDICARE

## 2020-08-22 ENCOUNTER — APPOINTMENT (OUTPATIENT)
Dept: GENERAL RADIOLOGY | Age: 60
DRG: 453 | End: 2020-08-22
Attending: INTERNAL MEDICINE
Payer: MEDICARE

## 2020-08-22 LAB
ANION GAP SERPL CALC-SCNC: 4 MMOL/L (ref 3–18)
ANION GAP SERPL CALC-SCNC: 4 MMOL/L (ref 3–18)
BACTERIA SPEC CULT: NORMAL
BASOPHILS # BLD: 0 K/UL (ref 0–0.1)
BASOPHILS NFR BLD: 0 % (ref 0–2)
BNP SERPL-MCNC: 1358 PG/ML (ref 0–900)
BUN SERPL-MCNC: 10 MG/DL (ref 7–18)
BUN SERPL-MCNC: 15 MG/DL (ref 7–18)
BUN/CREAT SERPL: 11 (ref 12–20)
BUN/CREAT SERPL: 16 (ref 12–20)
CALCIUM SERPL-MCNC: 7.9 MG/DL (ref 8.5–10.1)
CALCIUM SERPL-MCNC: 8.3 MG/DL (ref 8.5–10.1)
CHLORIDE SERPL-SCNC: 106 MMOL/L (ref 100–111)
CHLORIDE SERPL-SCNC: 107 MMOL/L (ref 100–111)
CK MB CFR SERPL CALC: NORMAL % (ref 0–4)
CK MB SERPL-MCNC: <1 NG/ML (ref 5–25)
CK SERPL-CCNC: 182 U/L (ref 26–192)
CO2 SERPL-SCNC: 28 MMOL/L (ref 21–32)
CO2 SERPL-SCNC: 28 MMOL/L (ref 21–32)
CREAT SERPL-MCNC: 0.87 MG/DL (ref 0.6–1.3)
CREAT SERPL-MCNC: 0.93 MG/DL (ref 0.6–1.3)
DIFFERENTIAL METHOD BLD: ABNORMAL
EOSINOPHIL # BLD: 0.4 K/UL (ref 0–0.4)
EOSINOPHIL NFR BLD: 4 % (ref 0–5)
ERYTHROCYTE [DISTWIDTH] IN BLOOD BY AUTOMATED COUNT: 16.6 % (ref 11.6–14.5)
ERYTHROCYTE [DISTWIDTH] IN BLOOD BY AUTOMATED COUNT: 16.7 % (ref 11.6–14.5)
GLUCOSE SERPL-MCNC: 111 MG/DL (ref 74–99)
GLUCOSE SERPL-MCNC: 117 MG/DL (ref 74–99)
HCT VFR BLD AUTO: 27.4 % (ref 35–45)
HCT VFR BLD AUTO: 28.8 % (ref 35–45)
HGB BLD-MCNC: 9 G/DL (ref 12–16)
HGB BLD-MCNC: 9.2 G/DL (ref 12–16)
LACTATE SERPL-SCNC: 0.9 MMOL/L (ref 0.4–2)
LYMPHOCYTES # BLD: 1.2 K/UL (ref 0.9–3.6)
LYMPHOCYTES NFR BLD: 12 % (ref 21–52)
MCH RBC QN AUTO: 30.2 PG (ref 24–34)
MCH RBC QN AUTO: 30.3 PG (ref 24–34)
MCHC RBC AUTO-ENTMCNC: 31.9 G/DL (ref 31–37)
MCHC RBC AUTO-ENTMCNC: 32.8 G/DL (ref 31–37)
MCV RBC AUTO: 91.9 FL (ref 74–97)
MCV RBC AUTO: 94.7 FL (ref 74–97)
MONOCYTES # BLD: 0.9 K/UL (ref 0.05–1.2)
MONOCYTES NFR BLD: 9 % (ref 3–10)
NEUTS SEG # BLD: 7.2 K/UL (ref 1.8–8)
NEUTS SEG NFR BLD: 75 % (ref 40–73)
PLATELET # BLD AUTO: 191 K/UL (ref 135–420)
PLATELET # BLD AUTO: 205 K/UL (ref 135–420)
PMV BLD AUTO: 9.1 FL (ref 9.2–11.8)
PMV BLD AUTO: 9.2 FL (ref 9.2–11.8)
POTASSIUM SERPL-SCNC: 3.7 MMOL/L (ref 3.5–5.5)
POTASSIUM SERPL-SCNC: 4 MMOL/L (ref 3.5–5.5)
RBC # BLD AUTO: 2.98 M/UL (ref 4.2–5.3)
RBC # BLD AUTO: 3.04 M/UL (ref 4.2–5.3)
SERVICE CMNT-IMP: NORMAL
SODIUM SERPL-SCNC: 138 MMOL/L (ref 136–145)
SODIUM SERPL-SCNC: 139 MMOL/L (ref 136–145)
TROPONIN I SERPL-MCNC: <0.02 NG/ML (ref 0–0.04)
WBC # BLD AUTO: 10.5 K/UL (ref 4.6–13.2)
WBC # BLD AUTO: 9.7 K/UL (ref 4.6–13.2)

## 2020-08-22 PROCEDURE — 71046 X-RAY EXAM CHEST 2 VIEWS: CPT

## 2020-08-22 PROCEDURE — 74011250637 HC RX REV CODE- 250/637: Performed by: PHYSICIAN ASSISTANT

## 2020-08-22 PROCEDURE — 97116 GAIT TRAINING THERAPY: CPT

## 2020-08-22 PROCEDURE — 85025 COMPLETE CBC W/AUTO DIFF WBC: CPT

## 2020-08-22 PROCEDURE — 82550 ASSAY OF CK (CPK): CPT

## 2020-08-22 PROCEDURE — P9047 ALBUMIN (HUMAN), 25%, 50ML: HCPCS | Performed by: INTERNAL MEDICINE

## 2020-08-22 PROCEDURE — 77010033678 HC OXYGEN DAILY

## 2020-08-22 PROCEDURE — 36415 COLL VENOUS BLD VENIPUNCTURE: CPT

## 2020-08-22 PROCEDURE — 85027 COMPLETE CBC AUTOMATED: CPT

## 2020-08-22 PROCEDURE — 93005 ELECTROCARDIOGRAM TRACING: CPT

## 2020-08-22 PROCEDURE — 74011250637 HC RX REV CODE- 250/637: Performed by: ORTHOPAEDIC SURGERY

## 2020-08-22 PROCEDURE — 74011000258 HC RX REV CODE- 258: Performed by: FAMILY MEDICINE

## 2020-08-22 PROCEDURE — 74011000636 HC RX REV CODE- 636: Performed by: ORTHOPAEDIC SURGERY

## 2020-08-22 PROCEDURE — 74011250636 HC RX REV CODE- 250/636

## 2020-08-22 PROCEDURE — 74011250637 HC RX REV CODE- 250/637: Performed by: INTERNAL MEDICINE

## 2020-08-22 PROCEDURE — 65610000006 HC RM INTENSIVE CARE

## 2020-08-22 PROCEDURE — 80048 BASIC METABOLIC PNL TOTAL CA: CPT

## 2020-08-22 PROCEDURE — 74011250636 HC RX REV CODE- 250/636: Performed by: FAMILY MEDICINE

## 2020-08-22 PROCEDURE — 83880 ASSAY OF NATRIURETIC PEPTIDE: CPT

## 2020-08-22 PROCEDURE — 74011250636 HC RX REV CODE- 250/636: Performed by: INTERNAL MEDICINE

## 2020-08-22 PROCEDURE — 83605 ASSAY OF LACTIC ACID: CPT

## 2020-08-22 PROCEDURE — 71275 CT ANGIOGRAPHY CHEST: CPT

## 2020-08-22 PROCEDURE — 74011250637 HC RX REV CODE- 250/637: Performed by: FAMILY MEDICINE

## 2020-08-22 RX ORDER — VANCOMYCIN 1.75 GRAM/500 ML IN 0.9 % SODIUM CHLORIDE INTRAVENOUS
1750 EVERY 12 HOURS
Status: DISCONTINUED | OUTPATIENT
Start: 2020-08-23 | End: 2020-08-23

## 2020-08-22 RX ORDER — LEVOFLOXACIN 500 MG/1
500 TABLET, FILM COATED ORAL EVERY 24 HOURS
Status: DISCONTINUED | OUTPATIENT
Start: 2020-08-22 | End: 2020-08-23

## 2020-08-22 RX ORDER — NOREPINEPHRINE BIT/0.9 % NACL 8 MG/250ML
.5-3 INFUSION BOTTLE (ML) INTRAVENOUS
Status: DISCONTINUED | OUTPATIENT
Start: 2020-08-22 | End: 2020-08-22

## 2020-08-22 RX ORDER — ALBUMIN HUMAN 250 G/1000ML
25 SOLUTION INTRAVENOUS ONCE
Status: COMPLETED | OUTPATIENT
Start: 2020-08-22 | End: 2020-08-22

## 2020-08-22 RX ORDER — VANCOMYCIN 2 GRAM/500 ML IN 0.9 % SODIUM CHLORIDE INTRAVENOUS
2000 ONCE
Status: DISCONTINUED | OUTPATIENT
Start: 2020-08-22 | End: 2020-08-23

## 2020-08-22 RX ORDER — LORAZEPAM 2 MG/ML
1 INJECTION INTRAMUSCULAR ONCE
Status: COMPLETED | OUTPATIENT
Start: 2020-08-22 | End: 2020-08-22

## 2020-08-22 RX ORDER — MORPHINE SULFATE 2 MG/ML
INJECTION, SOLUTION INTRAMUSCULAR; INTRAVENOUS
Status: COMPLETED
Start: 2020-08-22 | End: 2020-08-22

## 2020-08-22 RX ORDER — FUROSEMIDE 10 MG/ML
20 INJECTION INTRAMUSCULAR; INTRAVENOUS
Status: COMPLETED | OUTPATIENT
Start: 2020-08-22 | End: 2020-08-23

## 2020-08-22 RX ORDER — METOPROLOL TARTRATE 25 MG/1
12.5 TABLET, FILM COATED ORAL EVERY 12 HOURS
Status: DISCONTINUED | OUTPATIENT
Start: 2020-08-22 | End: 2020-08-25 | Stop reason: HOSPADM

## 2020-08-22 RX ADMIN — LEVOFLOXACIN 500 MG: 500 TABLET, FILM COATED ORAL at 17:09

## 2020-08-22 RX ADMIN — ACETAMINOPHEN 1000 MG: 500 TABLET ORAL at 14:18

## 2020-08-22 RX ADMIN — ATORVASTATIN CALCIUM 20 MG: 20 TABLET, FILM COATED ORAL at 23:47

## 2020-08-22 RX ADMIN — PIPERACILLIN AND TAZOBACTAM 3.38 G: 3; .375 INJECTION, POWDER, LYOPHILIZED, FOR SOLUTION INTRAVENOUS at 05:01

## 2020-08-22 RX ADMIN — METOPROLOL TARTRATE 12.5 MG: 25 TABLET, FILM COATED ORAL at 14:17

## 2020-08-22 RX ADMIN — POTASSIUM CHLORIDE 10 MEQ: 750 TABLET, EXTENDED RELEASE ORAL at 08:25

## 2020-08-22 RX ADMIN — FAMOTIDINE 20 MG: 20 TABLET, FILM COATED ORAL at 08:25

## 2020-08-22 RX ADMIN — IOPAMIDOL 100 ML: 755 INJECTION, SOLUTION INTRAVENOUS at 23:12

## 2020-08-22 RX ADMIN — MORPHINE SULFATE 2 MG: 2 INJECTION, SOLUTION INTRAMUSCULAR; INTRAVENOUS at 20:25

## 2020-08-22 RX ADMIN — OXYCODONE 10 MG: 5 TABLET ORAL at 05:05

## 2020-08-22 RX ADMIN — OXYCODONE 10 MG: 5 TABLET ORAL at 12:15

## 2020-08-22 RX ADMIN — Medication 10 ML: at 06:18

## 2020-08-22 RX ADMIN — ALBUMIN (HUMAN) 25 G: 0.25 INJECTION, SOLUTION INTRAVENOUS at 19:01

## 2020-08-22 RX ADMIN — Medication 10 ML: at 14:24

## 2020-08-22 RX ADMIN — ARIPIPRAZOLE 2 MG: 2 TABLET ORAL at 23:47

## 2020-08-22 RX ADMIN — LAMOTRIGINE 150 MG: 100 TABLET ORAL at 23:47

## 2020-08-22 RX ADMIN — FAMOTIDINE 20 MG: 20 TABLET, FILM COATED ORAL at 23:47

## 2020-08-22 RX ADMIN — VANCOMYCIN HYDROCHLORIDE 1000 MG: 1 INJECTION, POWDER, LYOPHILIZED, FOR SOLUTION INTRAVENOUS at 03:38

## 2020-08-22 RX ADMIN — PREGABALIN 300 MG: 75 CAPSULE ORAL at 23:48

## 2020-08-22 RX ADMIN — LORAZEPAM 1 MG: 2 INJECTION INTRAMUSCULAR at 20:00

## 2020-08-22 NOTE — PROGRESS NOTES
Shift Summary:  Patient remained tachycardic overnight at 113-114, afebrile. Patient required Roxicodone 10 mg po for pain to lower back rating at 8/10. Dressing to back CDI. Hemovac 10 ml serosanguinous drainage overnight. Hemovac removed and new dressings applied. Incision to low back well-approximated without redness, drainage, or swelling. Patient Vitals for the past 8 hrs:   Temp Pulse Resp BP SpO2   08/22/20 0325 98.6 °F (37 °C) (!) 113 18 139/82 96 %   08/22/20 0008 99.2 °F (37.3 °C) (!) 113 20 125/63 98 %   08/21/20 2254 98.9 °F (37.2 °C) (!) 114 20 119/72 97 %         0727 - Bedside and Verbal shift change report given to KISHAN Vieira RN (oncoming nurse) by Alfredo José (offgoing nurse). Report included the following information SBAR, Kardex, Intake/Output and MAR.

## 2020-08-22 NOTE — PROGRESS NOTES
1816  Paged dr Susan Horn at this time in regard to pt b/p trending downward. Previously  101/47 at 1545. Now 85/50 awaiting return call    1824  Dr Nilson Garzon aware of pt low b/p. Telephone Orders with readback for albumin 25% (25g)  One time, Stat CBC, stat chemistry And transfer to ICU    1825  CT informed nurse that pt refuses to have CT done as she does not want to lie on table. [facility]  Dr Nilson Garzon made aware of pt refusal of CT Nurse will strongly encourage pt to have CT     1836  Supervisor aware pt needs ICU bed for transfer awaiting return call    [facility]  Nurse attempted several times to encourage pt of the importance of having CT done pt states \"its easier to say when you are not the one on the table\". Nurse verbalized understanding and explained to pt her b/p and vitals are unstable and CT and other labs will give us a better picture. Pt continues to refuse. Primary nurse made aware     Simmie June Dr Jackie Mendez that pt still cont. To refuses CTA at this time. Dr Jackie Mendez will come to speak to pt.    Silvestre Mendez stated pt agrees to CTA, she will order 1mg of IV ativan to administer and transfer to ICU. 2010  Pt b/p continues to decline medics at bedside pt needing to transfer to ICU now.  Primary nurse giving report

## 2020-08-22 NOTE — ROUTINE PROCESS
Bedside and Verbal shift change report given to Krystyna Alvarez RN (oncoming nurse) by KISHAN Cobos RN (offgoing nurse). Report included the following information SBAR, Kardex, OR Summary, Intake/Output, MAR and Recent Results.

## 2020-08-22 NOTE — PROGRESS NOTES
1705  Reassessment performed. No changes from previous assessment. Patient a&ox4. C/o pain 7/10. Unable to administer pain medications at this time d/t hypotension per MD. Dressing to lower back CDI. Lungs diminished and on 2 L O2. Nurse will continue to monitor. 1810  Patient sating 97-98% on room air. D/c O2. Transfering patient down to CT at this time. 1905  Applied new 4x4 mepilex to lower back. BP manually 102/62 with physician at bedside. Shift Summary  1920  Patient alert and oriented x4. Voiding sufficient amounts. Pain uncontrolled at 7/10 d/t patient not being able to take narcotics as they induce severe drowsiness and patient is hypotensive.

## 2020-08-22 NOTE — PROGRESS NOTES
Progress Note      Patient: Vera Weston               Sex: female          DOA: 8/20/2020         YOB: 1960      Age:  62 y.o.        LOS:  LOS: 1 day     Procedure: Procedure(s):  LUMBAR 3-LUMBAR 5 LAMINECTOMY, INSTRUMENTED FUSION WITH CAGES, EXPLORATION OF LUMBAR 5 - SACRAL 1 FUSION, REMOVAL OF LUMBAR 5-SACRAL 1 HARDWARE WITH C-ARM  **SPEC POP**            Subjective:     Vera Weston is a 62 y.o. female with no new changes. Denies chest pain or chest tightness, SOB, dizziness. Followed by hospitalist for concern of sepsis from urinary source. On Vancomycin and Zosyn currently. Blood cultures no growth. Objective:      Visit Vitals  /82 (BP 1 Location: Right arm, BP Patient Position: At rest)   Pulse (!) 113   Temp 98.6 °F (37 °C)   Resp 18   Ht 5' 4\" (1.626 m)   Wt 123.8 kg (273 lb)   SpO2 96%   BMI 46.86 kg/m²       Physical Exam:  A&O x3  VSS  HF/GS/QUADS/EHL/TA 5/5 bilateral  Calves nontender  Dressing: C/D/I    Intake and Output:  Current Shift:  No intake/output data recorded.   Last three shifts:  08/20 1901 - 08/22 0700  In: 8015 [P.O.:96; I.V.:7919]  Out: 6721 [Urine:3950; Drains:70]    Drain Output:    Lab/Data Reviewed:  CMP:   Lab Results   Component Value Date/Time     08/22/2020 02:11 AM    K 3.7 08/22/2020 02:11 AM     08/22/2020 02:11 AM    CO2 28 08/22/2020 02:11 AM    AGAP 4 08/22/2020 02:11 AM     (H) 08/22/2020 02:11 AM    BUN 15 08/22/2020 02:11 AM    CREA 0.93 08/22/2020 02:11 AM    GFRAA >60 08/22/2020 02:11 AM    GFRNA >60 08/22/2020 02:11 AM    CA 7.9 (L) 08/22/2020 02:11 AM     CBC:   Lab Results   Component Value Date/Time    WBC 10.5 08/22/2020 02:11 AM    HGB 9.0 (L) 08/22/2020 02:11 AM    HCT 27.4 (L) 08/22/2020 02:11 AM     08/22/2020 02:11 AM       Medications Reviewed    Continued hospitalization is indicated due to medical needs      Assessment/Plan     Principal Problem:    Lumbar spinal stenosis (8/19/2020)    Active Problems:    HTN (hypertension), benign ()      Hyperlipidemia (3/15/2018)      Lumbar spondylosis (8/19/2020)      Radiculopathy of leg (8/19/2020)      Sepsis ([facility] (8/21/2020)      ROSANGELA (acute kidney injury) ([facility] [facility] (8/21/2020)      UTI (urinary tract infection) (8/21/2020)      S/P lumbar fusion (8/21/2020)        S/p Lumbar fusion  Change Dressing  Doubtful source of infection, more likely fluid management issues. Maintain fluids. Possibly d/c antibiotics.        D/C to home versus rehab dependent upon hospital course

## 2020-08-22 NOTE — ROUTINE PROCESS
1540 
Bedside shift change report given to Arely MONTANEZ RN (oncoming nurse) by Darlen Dandy RN (offgoing nurse). Report included the following information SBAR, Kardex, Intake/Output and MAR.

## 2020-08-22 NOTE — PROGRESS NOTES
Hospitalist Progress Note    Patient: Carlita Mckenzie MRN: [id number]  CSN: [id number]    YOB: 1960  Age: 62 y.o. Sex: female    DOA: 8/20/2020 LOS:  LOS: 1 day            Assessment/Plan   1. Postoperative hypotension- resolved. Doubt sepsis as source. Urine culture with no growth. Tachycardia multifactorial related to uncontrolled pain and betablocker withdrawal  2. Possible bronchitis, doubt pneumonia  3. LV systolic CHF, chronic, compensated  4. CAD  5. Morbid obesity  6. HTN  7. Morbid obesity  8. Probable ALLEN  9. Sp lumbar surgery      Plan:  - stop fluids  - stop zosyn/vancomycin  - start levaquin po- can complete 5 day course  - repeat CXR gven infiltrates/ cardiomegaly  - EKG now  - BNP  - resume metoprolol at lower dose  - anticipate DC tomorrow from medical standpoint      Addendum 6:32 PM  BNP reviewed earlier as well as CXR results. CTA ordered. Called by bedside nurse for hypotension.  Patient currently in cat scan  BP nted to be in 66G systolic    -> hold lasix  - > give albumin IV now  - > add back zosyn, levaquin  -> repeat CBC, CMP STAT, add cardiac panel  -> transfer to ICU  -> discussed w Dr Stacia Barrera    Patient Active Problem List   Diagnosis Code    HTN (hypertension), benign I10    Depression F32.9    Asthma J45.909    Bipolar 1 disorder, depressed ([address] F31.9    PTSD (post-traumatic stress disorder) F43.10    Back pain M54.9    Aortocoronary bypass status Z95.1    3-vessel CAD I25.10    Essential hypertension I10    Hyperlipidemia E78.5    Morbid obesity ([address] E66.01    Lumbar spinal stenosis M48.061    Lumbar spondylosis M47.816    Radiculopathy of leg M54.10    Sepsis ([address] A41.9    ROSANGELA (acute kidney injury) ([address] N17.9    UTI (urinary tract infection) N39.0    S/P lumbar fusion Z98.1               Subjective:    cc: \" my back hurts\"  No acute events overnight  Denies dizziness, light headedness, chest pain, pressure, palpitations, dysuria  Does complain of cough productive of green sputum. No dyspnea or wheezing noted      REVIEW OF SYSTEMS:  General: No fevers or chills. Cardiovascular: No chest pain or pressure. No palpitations. Pulmonary: No shortness of breath. Gastrointestinal: No nausea, vomiting. Objective:        Vital signs/Intake and Output:  Visit Vitals  /78   Pulse (!) 120   Temp 99.1 °F (37.3 °C)   Resp 18   Ht 5' 4\" (1.626 m)   Wt 123.8 kg (273 lb)   SpO2 95%   BMI 46.86 kg/m²     Current Shift:  No intake/output data recorded. Last three shifts:  08/20 1901 - 08/22 0700  In: 8045 [P.O.:126; I.V.:7919]  Out: 4020 [Urine:3950; Drains:70]    Body mass index is 46.86 kg/m².     Physical Exam:  GEN: Alert and oriented times three NAD,obese  EYES: conjunctiva normal, lids with out lesions  HEENT: MMM, No thyromegaly, no lymphadenopathy  HEART: tachycardic +S1 +S2, no JVD, pulses 2+ distally  LUNGS: CTA B/L no wheezes, rales or rhonchi  ABDOMEN: + BS, soft NT/ND no organomegaly,  No rebound  EXTREMITIES: No edema cyanosis, cap refill normal   SKIN: no rashes or skin breakdown, no nodules, normal turgor  Current Facility-Administered Medications   Medication Dose Route Frequency    metoprolol tartrate (LOPRESSOR) tablet 12.5 mg  12.5 mg Oral Q12H    acetaminophen (TYLENOL) tablet 1,000 mg  1,000 mg Oral Q8H PRN    oxyCODONE IR (ROXICODONE) tablet 10 mg  10 mg Oral Q6H PRN    sodium chloride (NS) flush 5-40 mL  5-40 mL IntraVENous Q8H    sodium chloride (NS) flush 5-40 mL  5-40 mL IntraVENous PRN    HYDROmorphone (PF) (DILAUDID) injection 1 mg  1 mg IntraVENous Q2H PRN    naloxone (NARCAN) injection 0.4 mg  0.4 mg IntraVENous PRN    ondansetron (ZOFRAN) injection 4 mg  4 mg IntraVENous Q4H PRN    diphenhydrAMINE (BENADRYL) injection 12.5 mg  12.5 mg IntraVENous Q4H PRN    famotidine (PEPCID) tablet 20 mg  20 mg Oral Q12H    cyclobenzaprine (FLEXERIL) tablet 10 mg  10 mg Oral TID PRN    bisacodyL (DULCOLAX) tablet 5 mg  5 mg Oral DAILY PRN    temazepam (RESTORIL) capsule 15 mg  15 mg Oral QHS PRN    albuterol (PROVENTIL HFA, VENTOLIN HFA, PROAIR HFA) inhaler 1 Puff (Patient Supplied)  1 Puff Inhalation Q4H PRN    albuterol (PROVENTIL VENTOLIN) nebulizer solution 2.5 mg  2.5 mg Nebulization Q4H PRN    ARIPiprazole (ABILIFY) tablet 2 mg  2 mg Oral QHS    atorvastatin (LIPITOR) tablet 20 mg  20 mg Oral QHS    clonazePAM (KlonoPIN) tablet 1 mg  1 mg Oral TID PRN    furosemide (LASIX) tablet 40 mg  40 mg Oral EVERY OTHER DAY    lamoTRIgine (LaMICtal) tablet 150 mg  150 mg Oral BID    potassium chloride (KLOR-CON) tablet 10 mEq  10 mEq Oral DAILY    pregabalin (LYRICA) capsule 300 mg  300 mg Oral BID    umeclidinium-vilanterol (ANORO ELLIPTA) 62.5 mcg- 25 mcg/inhalation (Patient Supplied)  1 Puff Inhalation DAILY         All the patient's labs over the past 24 hours were reviewed both during my initial daily workflow process and at the time notated as \"note time\" in [facility]. (It is not time stamped separately in this workflow.)  Select labs are listed below.         Labs: Results:       Chemistry Recent Labs     08/22/20 0211 08/21/20 0356   * 120*    139   K 3.7 4.2    105   CO2 28 29   BUN 15 27*   CREA 0.93 1.53*   CA 7.9* 7.8*   AGAP 4 5   BUCR 16 18   AP  --  103   TP  --  5.1*   ALB  --  2.5*   GLOB  --  2.6   AGRAT  --  1.0      CBC w/Diff Recent Labs     08/22/20 0211 08/21/20  0356 08/20/20  1650   WBC 10.5 11.3  --    RBC 2.98* 3.48*  --    HGB 9.0* 10.4* 11.4*   HCT 27.4* 32.0* 35.7    216  --    GRANS  --  83*  --    LYMPH  --  11*  --    EOS  --  0  --               Lipid Panel Lab Results   Component Value Date/Time    Cholesterol, total 195 03/15/2018 08:15 AM    HDL Cholesterol 42 03/15/2018 08:15 AM    LDL, calculated 128.6 (H) 03/15/2018 08:15 AM    VLDL, calculated 24.4 03/15/2018 08:15 AM    Triglyceride 122 03/15/2018 08:15 AM    CHOL/HDL Ratio 4.6 03/15/2018 08:15 AM          Liver Enzymes Recent Labs     08/21/20  0356   TP 5.1*   ALB 2.5*             Procedures/imaging: see electronic medical records for all procedures/Xrays and details which were not copied into this note but were reviewed prior to creation of Parish Kapoor DO  Internal Medicine/Geriatrics

## 2020-08-22 NOTE — PROGRESS NOTES
Problem: Falls - Risk of  Goal: *Absence of Falls  Description: Document Karsten Rooney Fall Risk and appropriate interventions in the flowsheet. Outcome: Progressing Towards Goal  Note: Fall Risk Interventions:  Mobility Interventions: Utilize walker, cane, or other assistive device, Patient to call before getting OOB         Medication Interventions: Patient to call before getting OOB, Evaluate medications/consider consulting pharmacy    Elimination Interventions: Call light in reach, Patient to call for help with toileting needs              Problem: Patient Education: Go to Patient Education Activity  Goal: Patient/Family Education  Outcome: Progressing Towards Goal     Problem: Patient Education: Go to Patient Education Activity  Goal: Patient/Family Education  Outcome: Progressing Towards Goal     Problem: Patient Education: Go to Patient Education Activity  Goal: Patient/Family Education  Outcome: Progressing Towards Goal     Problem: Pressure Injury - Risk of  Goal: *Prevention of pressure injury  Description: Document Kannan Scale and appropriate interventions in the flowsheet.   Outcome: Progressing Towards Goal  Note: Pressure Injury Interventions:       Moisture Interventions: Absorbent underpads    Activity Interventions: Increase time out of bed, Pressure redistribution bed/mattress(bed type), PT/OT evaluation    Mobility Interventions: Pressure redistribution bed/mattress (bed type), PT/OT evaluation    Nutrition Interventions: Document food/fluid/supplement intake                     Problem: Patient Education: Go to Patient Education Activity  Goal: Patient/Family Education  Outcome: Progressing Towards Goal     Problem: Pain  Goal: *Control of Pain  Outcome: Progressing Towards Goal     Problem: Patient Education: Go to Patient Education Activity  Goal: Patient/Family Education  Outcome: Progressing Towards Goal

## 2020-08-22 NOTE — PROGRESS NOTES
PATIENT STATES THAT SHE HAS NEVER BEEN ON A CPAP DEVICE BUT AGREES TO A TRIAL ON A HOSPITAL PROVIDED MACHINE TONIGHT. ADVISED HER THAT RESPIRATORY WILL ASSIST HER WITH SET UP.

## 2020-08-22 NOTE — PROGRESS NOTES
Problem: Mobility Impaired (Adult and Pediatric)  Goal: *Acute Goals and Plan of Care (Insert Text)  Description: In 1-4 days pt will be able to perform:  STG  1. Bed mobility:  Demonstrate proper log-roll technique for safe initiation of rolling for OOB activities. 2.  Supine to sit to supine S with HR for meals. 3.  Sit to stand to sit S with RW/LSO in prep for ambulation. LT.  Gait:  Ambulate 150ft S with RW/LSO, for home/community mobility. 2.  Activity tolerance: Tolerate up in chair 30 minutes-1 hour for ADL's.  3.  Patient/Family Education:  Patient/family to be independent with HEP for follow-up care and safe discharge. Outcome: Progressing Towards Goal   PHYSICAL THERAPY TREATMENT    Patient: Ju Porter (51 y.o. female)  Date: 2020  Diagnosis: Lumbar spinal stenosis [M48.061]  S/P lumbar fusion [Z98.1]   Lumbar spinal stenosis  Procedure(s) (LRB):  LUMBAR 3-LUMBAR 5 LAMINECTOMY, INSTRUMENTED FUSION WITH CAGES, EXPLORATION OF LUMBAR 5 - SACRAL 1 FUSION, REMOVAL OF LUMBAR 5-SACRAL 1 HARDWARE WITH C-ARM  **SPEC POP** (N/A) 2 Days Post-Op  Precautions: Fall, Back  PLOF: ambulatory with a cane, has a RW    ASSESSMENT:  Pt sitting in chair, LSO donned. VC for UE placement and CGA/SBA for sit to stand. Adjusted brace in standing. Pt ambulated 100ft with RW, very very slow pace, decreased step height and length of (B) LE, no LOB or path deviations. Jong and increased time needed to perform sit to supine. Progression toward goals:   []      Improving appropriately and progressing toward goals  [x]      Improving slowly and progressing toward goals  []      Not making progress toward goals and plan of care will be adjusted     PLAN:  Patient continues to benefit from skilled intervention to address the above impairments. Continue treatment per established plan of care.   Discharge Recommendations:  Home Health  Further Equipment Recommendations for Discharge:  rolling walker SUBJECTIVE:   Patient stated Sloan Rider.     OBJECTIVE DATA SUMMARY:   Critical Behavior:  Neurologic State: Drowsy  Orientation Level: Oriented X4  Cognition: Appropriate decision making, Appropriate for age attention/concentration, Appropriate safety awareness, Follows commands  Safety/Judgement: Decreased awareness of environment, Decreased awareness of need for assistance, Decreased awareness of need for safety, Decreased insight into deficits  Functional Mobility Training:  Bed Mobility:  Sit to Supine: Minimum assistance; Additional time  Transfers:  Sit to Stand: Stand-by assistance  Stand to Sit: Supervision  Balance:  Sitting: Intact  Standing: Intact; With support   Ambulation/Gait Training:  Distance (ft): 100 Feet (ft)  Assistive Device: Brace/Splint;Gait belt;Walker, rolling  Ambulation - Level of Assistance: Stand-by assistance  Gait Abnormalities: Antalgic;Decreased step clearance  Speed/Lucy: Slow  Step Length: Right shortened;Left shortened        Pain:  Pain level pre-treatment: 8/10  Pain level post-treatment: 9/10   Pain Intervention(s): Medication (see MAR); Rest, Ice, Repositioning   Response to intervention: Nurse notified, See doc flow    Activity Tolerance:   Fair  Please refer to the flowsheet for vital signs taken during this treatment. After treatment:   [] Patient left in no apparent distress sitting up in chair  [x] Patient left in no apparent distress in bed  [x] Call bell left within reach  [x] Nursing notified  [] Caregiver present  [] Bed alarm activated  [x] SCDs applied      COMMUNICATION/EDUCATION:   [x]         Role of Physical Therapy in the acute care setting. [x]         Fall prevention education was provided and the patient/caregiver indicated understanding. [x]         Patient/family have participated as able in working toward goals and plan of care. [x]         Patient/family agree to work toward stated goals and plan of care.   []         Patient understands intent and goals of therapy, but is neutral about his/her participation.   []         Patient is unable to participate in stated goals/plan of care: ongoing with therapy staff.  []         Other:        Sandra Rubin PTA   Time Calculation: 31 mins

## 2020-08-22 NOTE — PROGRESS NOTES
2 attempts to treat pt this afternoon. Pt left the floor for testing. Now pt is asleep, vitals taken by nurse drawing some concern, will follow up as schedule permits.

## 2020-08-22 NOTE — ROUTINE PROCESS
Bedside and Verbal shift change report given to RICHELLE Mcgee (oncoming nurse) by KISHAN Olivas RN (offgoing nurse). Report included the following information SBAR, Kardex, OR Summary, Intake/Output, MAR and Recent Results.

## 2020-08-22 NOTE — ROUTINE PROCESS
0740 - Bedside shift report received from Addis Green RN. Assumed care of patient. Patient noted resting in bed at this time. Call light in reach. 3416 - Assessment complete. Patient alert and oriented x 4. Cap refills < 3 sec. Pulses palpable and equal bilaterally. Lung sounds diminished. Educated on incentive spirometer and encouraged use. Respirations even and unlabored. Abd soft and nontender. Bowel sounds active to all 4 quads. Voiding without difficulty. Skin warm and dry. Drsg to lower back noted CDI. Back brace in place. IV to left FA, site CDI and capped. IV to right hand infusing without difficulty. SCD's to BLE. Patient ambulated to restroom, voided without difficulty, and assisted to chair. Patient reports pain 7/10. Patient sitting in chair awaiting breakfast with call light in reach. 1215 - PRN oxycodone administered for 8/10 sharp back pains. Patient ambulated to restroom and assisted to chair. Awaiting transportation to have chest xray. 1230 - Off floor for chest xray. 1300 - Returned to floor. 1330 - EKG obtained as per orders. 1418 - PRN Tylenol administered for temp of 100.2 F and 2L NC applied for desats to high 80's. 1425 - IV infiltrated to right hand and removed. Dry dressing applied. 56 - Dr. Marcy Herrera present and notified of elevating temp between  F and PRN tylenol administered, desats to 87-89% on room air and 2L NC applied, and increased heart rate between 125-129. Dr. Marcy Herrera stated to hold oxycodone and only give tylenol. Use incentive spirometer. Have respiratory therapy place patient on CPAP tonight. 1440 - Patient repositioned in the bed and incentive spirometer used. Patient encouraged to continue to use incentive spirometer to help air flow throughout lungs. 1600 - Received verbal order for CTA of chest from Dr. Marcy Herrera and verbal order for levaquin 500 mg.

## 2020-08-23 ENCOUNTER — APPOINTMENT (OUTPATIENT)
Dept: NON INVASIVE DIAGNOSTICS | Age: 60
DRG: 453 | End: 2020-08-23
Attending: INTERNAL MEDICINE
Payer: MEDICARE

## 2020-08-23 ENCOUNTER — APPOINTMENT (OUTPATIENT)
Dept: GENERAL RADIOLOGY | Age: 60
DRG: 453 | End: 2020-08-23
Attending: INTERNAL MEDICINE
Payer: MEDICARE

## 2020-08-23 ENCOUNTER — APPOINTMENT (OUTPATIENT)
Dept: VASCULAR SURGERY | Age: 60
DRG: 453 | End: 2020-08-23
Attending: INTERNAL MEDICINE
Payer: MEDICARE

## 2020-08-23 PROBLEM — Z99.81 HYPOXEMIA REQUIRING SUPPLEMENTAL OXYGEN: Status: ACTIVE | Noted: 2020-08-23

## 2020-08-23 PROBLEM — F17.290 CIGAR SMOKER: Status: ACTIVE | Noted: 2020-08-23

## 2020-08-23 PROBLEM — I50.31 ACUTE DIASTOLIC CHF (CONGESTIVE HEART FAILURE) (HCC): Status: ACTIVE | Noted: 2020-08-23

## 2020-08-23 PROBLEM — G47.33 OSA (OBSTRUCTIVE SLEEP APNEA): Status: ACTIVE | Noted: 2020-08-23

## 2020-08-23 PROBLEM — R09.02 HYPOXEMIA REQUIRING SUPPLEMENTAL OXYGEN: Status: ACTIVE | Noted: 2020-08-23

## 2020-08-23 PROBLEM — N39.0 SEPSIS SECONDARY TO UTI (HCC): Status: ACTIVE | Noted: 2020-08-21

## 2020-08-23 PROBLEM — R57.9 SHOCK (HCC): Status: ACTIVE | Noted: 2020-08-23

## 2020-08-23 PROBLEM — J44.9 COPD (CHRONIC OBSTRUCTIVE PULMONARY DISEASE) (HCC): Status: ACTIVE | Noted: 2020-08-23

## 2020-08-23 LAB
ALBUMIN SERPL-MCNC: 3 G/DL (ref 3.4–5)
ALBUMIN/GLOB SERPL: 1.1 {RATIO} (ref 0.8–1.7)
ALP SERPL-CCNC: 92 U/L (ref 45–117)
ALT SERPL-CCNC: 10 U/L (ref 13–56)
ANION GAP SERPL CALC-SCNC: 4 MMOL/L (ref 3–18)
APTT PPP: 36.4 SEC (ref 23–36.4)
ARTERIAL PATENCY WRIST A: NORMAL
AST SERPL-CCNC: 13 U/L (ref 10–38)
ATRIAL RATE: 131 BPM
AV VELOCITY RATIO: 0.78
AV VTI RATIO: 0.7
BASE DEFICIT BLD-SCNC: 3 MMOL/L
BASOPHILS # BLD: 0 K/UL (ref 0–0.1)
BASOPHILS NFR BLD: 0 % (ref 0–2)
BDY SITE: NORMAL
BILIRUB DIRECT SERPL-MCNC: 0.3 MG/DL (ref 0–0.2)
BILIRUB SERPL-MCNC: 0.6 MG/DL (ref 0.2–1)
BNP SERPL-MCNC: 1417 PG/ML (ref 0–900)
BUN SERPL-MCNC: 10 MG/DL (ref 7–18)
BUN/CREAT SERPL: 10 (ref 12–20)
CALCIUM SERPL-MCNC: 8.3 MG/DL (ref 8.5–10.1)
CALCULATED P AXIS, ECG09: 53 DEGREES
CALCULATED R AXIS, ECG10: 9 DEGREES
CALCULATED T AXIS, ECG11: 103 DEGREES
CHLORIDE SERPL-SCNC: 104 MMOL/L (ref 100–111)
CK MB CFR SERPL CALC: NORMAL % (ref 0–4)
CK MB SERPL-MCNC: <1 NG/ML (ref 5–25)
CK SERPL-CCNC: 101 U/L (ref 26–192)
CO2 SERPL-SCNC: 30 MMOL/L (ref 21–32)
CORTIS SERPL-MCNC: 13.1 UG/DL
CREAT SERPL-MCNC: 0.96 MG/DL (ref 0.6–1.3)
CRP SERPL-MCNC: 21.4 MG/DL (ref 0–0.3)
DIAGNOSIS, 93000: NORMAL
DIFFERENTIAL METHOD BLD: ABNORMAL
ECHO AO ASC DIAM: 2.79 CM
ECHO AV ANNULUS DIAM: 3.4 CM
ECHO AV AREA PEAK VELOCITY: 2.66 CM2
ECHO AV AREA VTI: 2.51 CM2
ECHO AV AREA/BSA PEAK VELOCITY: 1.2 CM2/M2
ECHO AV AREA/BSA VTI: 1.1 CM2/M2
ECHO AV MEAN GRADIENT: 1.78 MMHG
ECHO AV MEAN VELOCITY: 0.63 M/S
ECHO AV PEAK GRADIENT: 2.93 MMHG
ECHO AV PEAK VELOCITY: 86 CM/S
ECHO AV VTI: 19.88 CM
ECHO LA MAJOR AXIS: 4.63 CM
ECHO LA MINOR AXIS: 2.07 CM
ECHO LV E' LATERAL VELOCITY: 9 CM/S
ECHO LV E' SEPTAL VELOCITY: 6 CM/S
ECHO LV EDV TEICHHOLZ: 0.33 ML
ECHO LV ESV TEICHHOLZ: 0.15 ML
ECHO LV INTERNAL DIMENSION DIASTOLIC: 3.57 CM (ref 3.9–5.3)
ECHO LV INTERNAL DIMENSION SYSTOLIC: 2.57 CM
ECHO LV IVSD: 1.42 CM (ref 0.6–0.9)
ECHO LV MASS 2D: 173.8 G (ref 67–162)
ECHO LV MASS INDEX 2D: 77.8 G/M2 (ref 43–95)
ECHO LV POSTERIOR WALL DIASTOLIC: 1.34 CM (ref 0.6–0.9)
ECHO LV POSTERIOR WALL SYSTOLIC: 0 CM
ECHO LVOT CARDIAC OUTPUT: 9.76 L/MIN
ECHO LVOT DIAM: 2.08 CM
ECHO LVOT PEAK GRADIENT: 1.79 MMHG
ECHO LVOT PEAK VELOCITY: 67 CM/S
ECHO LVOT SV: 49.8 ML
ECHO LVOT VTI: 14.67 CM
ECHO MV A VELOCITY: 108 CM/S
ECHO MV AREA PHT: 4.52 CM2
ECHO MV E DECELERATION TIME (DT): 0.17 S
ECHO MV E VELOCITY: 71 CM/S
ECHO MV E/A RATIO: 0.66
ECHO MV E/E' LATERAL: 7.89
ECHO MV E/E' RATIO (AVERAGED): 9.86
ECHO MV E/E' SEPTAL: 11.83
ECHO MV PRESSURE HALF TIME (PHT): 0.05 S
ECHO RV TAPSE: 1.55 CM (ref 1.5–2)
EOSINOPHIL # BLD: 0.3 K/UL (ref 0–0.4)
EOSINOPHIL NFR BLD: 3 % (ref 0–5)
ERYTHROCYTE [DISTWIDTH] IN BLOOD BY AUTOMATED COUNT: 16.4 % (ref 11.6–14.5)
ERYTHROCYTE [DISTWIDTH] IN BLOOD BY AUTOMATED COUNT: 16.5 % (ref 11.6–14.5)
GAS FLOW.O2 O2 DELIVERY SYS: NORMAL L/MIN
GAS FLOW.O2 SETTING OXYMISER: 2 L/M
GLOBULIN SER CALC-MCNC: 2.8 G/DL (ref 2–4)
GLUCOSE SERPL-MCNC: 97 MG/DL (ref 74–99)
HCO3 BLD-SCNC: 22.9 MMOL/L (ref 22–26)
HCT VFR BLD AUTO: 25.1 % (ref 35–45)
HCT VFR BLD AUTO: 25.8 % (ref 35–45)
HGB BLD-MCNC: 8.1 G/DL (ref 12–16)
HGB BLD-MCNC: 8.2 G/DL (ref 12–16)
INR PPP: 1.2 (ref 0.8–1.2)
LACTATE SERPL-SCNC: 0.8 MMOL/L (ref 0.4–2)
LVFS 2D: 28.14 %
LVOT MG: 1.05 MMHG
LVOT MV: 0.48 CM/S
LVSV (TEICH): 12.5 ML
LYMPHOCYTES # BLD: 1.3 K/UL (ref 0.9–3.6)
LYMPHOCYTES NFR BLD: 13 % (ref 21–52)
MCH RBC QN AUTO: 30.1 PG (ref 24–34)
MCH RBC QN AUTO: 30.6 PG (ref 24–34)
MCHC RBC AUTO-ENTMCNC: 31.8 G/DL (ref 31–37)
MCHC RBC AUTO-ENTMCNC: 32.3 G/DL (ref 31–37)
MCV RBC AUTO: 94.7 FL (ref 74–97)
MCV RBC AUTO: 94.9 FL (ref 74–97)
MONOCYTES # BLD: 0.9 K/UL (ref 0.05–1.2)
MONOCYTES NFR BLD: 9 % (ref 3–10)
MV DEC SLOPE: 4.21
NEUTS SEG # BLD: 7.4 K/UL (ref 1.8–8)
NEUTS SEG NFR BLD: 75 % (ref 40–73)
P-R INTERVAL, ECG05: 160 MS
PCO2 BLD: 41.2 MMHG (ref 35–45)
PH BLD: 7.35 [PH] (ref 7.35–7.45)
PLATELET # BLD AUTO: 188 K/UL (ref 135–420)
PLATELET # BLD AUTO: 195 K/UL (ref 135–420)
PMV BLD AUTO: 8.4 FL (ref 9.2–11.8)
PMV BLD AUTO: 8.8 FL (ref 9.2–11.8)
PO2 BLD: 83 MMHG (ref 80–100)
POTASSIUM SERPL-SCNC: 4 MMOL/L (ref 3.5–5.5)
PROT SERPL-MCNC: 5.8 G/DL (ref 6.4–8.2)
PROTHROMBIN TIME: 15.3 SEC (ref 11.5–15.2)
Q-T INTERVAL, ECG07: 296 MS
QRS DURATION, ECG06: 68 MS
QTC CALCULATION (BEZET), ECG08: 437 MS
RBC # BLD AUTO: 2.65 M/UL (ref 4.2–5.3)
RBC # BLD AUTO: 2.72 M/UL (ref 4.2–5.3)
SAO2 % BLD: 96 % (ref 92–97)
SERVICE CMNT-IMP: NORMAL
SODIUM SERPL-SCNC: 138 MMOL/L (ref 136–145)
SPECIMEN TYPE: NORMAL
TROPONIN I SERPL-MCNC: <0.02 NG/ML (ref 0–0.04)
TSH SERPL DL<=0.05 MIU/L-ACNC: 0.74 UIU/ML (ref 0.36–3.74)
VENTRICULAR RATE, ECG03: 131 BPM
WBC # BLD AUTO: 9.2 K/UL (ref 4.6–13.2)
WBC # BLD AUTO: 9.8 K/UL (ref 4.6–13.2)

## 2020-08-23 PROCEDURE — 94640 AIRWAY INHALATION TREATMENT: CPT

## 2020-08-23 PROCEDURE — 74011000250 HC RX REV CODE- 250: Performed by: INTERNAL MEDICINE

## 2020-08-23 PROCEDURE — 82550 ASSAY OF CK (CPK): CPT

## 2020-08-23 PROCEDURE — 80048 BASIC METABOLIC PNL TOTAL CA: CPT

## 2020-08-23 PROCEDURE — 03HY32Z INSERTION OF MONITORING DEVICE INTO UPPER ARTERY, PERCUTANEOUS APPROACH: ICD-10-PCS | Performed by: INTERNAL MEDICINE

## 2020-08-23 PROCEDURE — 86140 C-REACTIVE PROTEIN: CPT

## 2020-08-23 PROCEDURE — C8929 TTE W OR WO FOL WCON,DOPPLER: HCPCS

## 2020-08-23 PROCEDURE — 84443 ASSAY THYROID STIM HORMONE: CPT

## 2020-08-23 PROCEDURE — 83880 ASSAY OF NATRIURETIC PEPTIDE: CPT

## 2020-08-23 PROCEDURE — 74011250637 HC RX REV CODE- 250/637: Performed by: FAMILY MEDICINE

## 2020-08-23 PROCEDURE — 82803 BLOOD GASES ANY COMBINATION: CPT

## 2020-08-23 PROCEDURE — 74011250636 HC RX REV CODE- 250/636: Performed by: INTERNAL MEDICINE

## 2020-08-23 PROCEDURE — 85730 THROMBOPLASTIN TIME PARTIAL: CPT

## 2020-08-23 PROCEDURE — 36415 COLL VENOUS BLD VENIPUNCTURE: CPT

## 2020-08-23 PROCEDURE — 74011250637 HC RX REV CODE- 250/637: Performed by: PHYSICIAN ASSISTANT

## 2020-08-23 PROCEDURE — 83605 ASSAY OF LACTIC ACID: CPT

## 2020-08-23 PROCEDURE — 80076 HEPATIC FUNCTION PANEL: CPT

## 2020-08-23 PROCEDURE — 85027 COMPLETE CBC AUTOMATED: CPT

## 2020-08-23 PROCEDURE — 74011250636 HC RX REV CODE- 250/636: Performed by: ORTHOPAEDIC SURGERY

## 2020-08-23 PROCEDURE — 74011250637 HC RX REV CODE- 250/637: Performed by: ORTHOPAEDIC SURGERY

## 2020-08-23 PROCEDURE — 74011000258 HC RX REV CODE- 258: Performed by: INTERNAL MEDICINE

## 2020-08-23 PROCEDURE — 85025 COMPLETE CBC W/AUTO DIFF WBC: CPT

## 2020-08-23 PROCEDURE — 93970 EXTREMITY STUDY: CPT

## 2020-08-23 PROCEDURE — 77030005402 HC CATH RAD ART LN KT TELE -B

## 2020-08-23 PROCEDURE — 85610 PROTHROMBIN TIME: CPT

## 2020-08-23 PROCEDURE — 77010033678 HC OXYGEN DAILY

## 2020-08-23 PROCEDURE — 77030005401 HC CATH RAD ARRO -A

## 2020-08-23 PROCEDURE — 82533 TOTAL CORTISOL: CPT

## 2020-08-23 PROCEDURE — P9047 ALBUMIN (HUMAN), 25%, 50ML: HCPCS | Performed by: FAMILY MEDICINE

## 2020-08-23 PROCEDURE — 65610000006 HC RM INTENSIVE CARE

## 2020-08-23 PROCEDURE — 74011250636 HC RX REV CODE- 250/636: Performed by: FAMILY MEDICINE

## 2020-08-23 PROCEDURE — 94660 CPAP INITIATION&MGMT: CPT

## 2020-08-23 PROCEDURE — 71045 X-RAY EXAM CHEST 1 VIEW: CPT

## 2020-08-23 RX ORDER — OXYCODONE HYDROCHLORIDE 5 MG/1
5 TABLET ORAL
Status: DISCONTINUED | OUTPATIENT
Start: 2020-08-23 | End: 2020-08-25 | Stop reason: HOSPADM

## 2020-08-23 RX ORDER — VANCOMYCIN 1.75 GRAM/500 ML IN 0.9 % SODIUM CHLORIDE INTRAVENOUS
1750 EVERY 12 HOURS
Status: DISCONTINUED | OUTPATIENT
Start: 2020-08-24 | End: 2020-08-25 | Stop reason: HOSPADM

## 2020-08-23 RX ORDER — BUDESONIDE 0.5 MG/2ML
500 INHALANT ORAL
Status: DISCONTINUED | OUTPATIENT
Start: 2020-08-23 | End: 2020-08-25 | Stop reason: HOSPADM

## 2020-08-23 RX ORDER — VANCOMYCIN 2 GRAM/500 ML IN 0.9 % SODIUM CHLORIDE INTRAVENOUS
2000 ONCE
Status: COMPLETED | OUTPATIENT
Start: 2020-08-23 | End: 2020-08-24

## 2020-08-23 RX ORDER — NOREPINEPHRINE BIT/0.9 % NACL 8 MG/250ML
.5-3 INFUSION BOTTLE (ML) INTRAVENOUS
Status: DISCONTINUED | OUTPATIENT
Start: 2020-08-23 | End: 2020-08-24

## 2020-08-23 RX ORDER — IPRATROPIUM BROMIDE AND ALBUTEROL SULFATE 2.5; .5 MG/3ML; MG/3ML
3 SOLUTION RESPIRATORY (INHALATION)
Status: DISCONTINUED | OUTPATIENT
Start: 2020-08-23 | End: 2020-08-25 | Stop reason: HOSPADM

## 2020-08-23 RX ORDER — FUROSEMIDE 10 MG/ML
20 INJECTION INTRAMUSCULAR; INTRAVENOUS 2 TIMES DAILY
Status: DISCONTINUED | OUTPATIENT
Start: 2020-08-23 | End: 2020-08-25 | Stop reason: HOSPADM

## 2020-08-23 RX ORDER — ALBUMIN HUMAN 250 G/1000ML
25 SOLUTION INTRAVENOUS EVERY 6 HOURS
Status: DISCONTINUED | OUTPATIENT
Start: 2020-08-23 | End: 2020-08-25 | Stop reason: HOSPADM

## 2020-08-23 RX ADMIN — ALBUMIN (HUMAN) 25 G: 0.25 INJECTION, SOLUTION INTRAVENOUS at 07:51

## 2020-08-23 RX ADMIN — PERFLUTREN 1 ML: 6.52 INJECTION, SUSPENSION INTRAVENOUS at 08:39

## 2020-08-23 RX ADMIN — BUDESONIDE 500 MCG: 0.5 INHALANT RESPIRATORY (INHALATION) at 09:23

## 2020-08-23 RX ADMIN — FAMOTIDINE 20 MG: 20 TABLET, FILM COATED ORAL at 10:41

## 2020-08-23 RX ADMIN — ACETAMINOPHEN 1000 MG: 500 TABLET ORAL at 02:25

## 2020-08-23 RX ADMIN — PHENYLEPHRINE HYDROCHLORIDE 30 MCG/MIN: 10 INJECTION INTRAVENOUS at 03:10

## 2020-08-23 RX ADMIN — BUDESONIDE 500 MCG: 0.5 INHALANT RESPIRATORY (INHALATION) at 20:50

## 2020-08-23 RX ADMIN — VANCOMYCIN HYDROCHLORIDE 2000 MG: 10 INJECTION, POWDER, LYOPHILIZED, FOR SOLUTION INTRAVENOUS at 22:54

## 2020-08-23 RX ADMIN — OXYCODONE 10 MG: 5 TABLET ORAL at 00:15

## 2020-08-23 RX ADMIN — LEVOFLOXACIN 500 MG: 500 TABLET, FILM COATED ORAL at 16:24

## 2020-08-23 RX ADMIN — FUROSEMIDE 20 MG: 10 INJECTION, SOLUTION INTRAMUSCULAR; INTRAVENOUS at 16:25

## 2020-08-23 RX ADMIN — Medication 10 ML: at 22:38

## 2020-08-23 RX ADMIN — ACETAMINOPHEN 1000 MG: 500 TABLET ORAL at 20:30

## 2020-08-23 RX ADMIN — ALBUMIN (HUMAN) 25 G: 0.25 INJECTION, SOLUTION INTRAVENOUS at 17:23

## 2020-08-23 RX ADMIN — PIPERACILLIN AND TAZOBACTAM 3.38 G: 3; .375 INJECTION, POWDER, LYOPHILIZED, FOR SOLUTION INTRAVENOUS at 00:15

## 2020-08-23 RX ADMIN — Medication 10 ML: at 13:22

## 2020-08-23 RX ADMIN — POTASSIUM CHLORIDE 10 MEQ: 750 TABLET, EXTENDED RELEASE ORAL at 10:41

## 2020-08-23 RX ADMIN — OXYCODONE 10 MG: 5 TABLET ORAL at 07:51

## 2020-08-23 RX ADMIN — Medication 10 ML: at 00:17

## 2020-08-23 RX ADMIN — PIPERACILLIN AND TAZOBACTAM 3.38 G: 3; .375 INJECTION, POWDER, LYOPHILIZED, FOR SOLUTION INTRAVENOUS at 07:50

## 2020-08-23 RX ADMIN — PHENYLEPHRINE HYDROCHLORIDE 40 MCG/MIN: 10 INJECTION INTRAVENOUS at 08:15

## 2020-08-23 RX ADMIN — FUROSEMIDE 20 MG: 10 INJECTION, SOLUTION INTRAMUSCULAR; INTRAVENOUS at 10:41

## 2020-08-23 RX ADMIN — ALBUMIN (HUMAN) 25 G: 0.25 INJECTION, SOLUTION INTRAVENOUS at 02:25

## 2020-08-23 RX ADMIN — PHENYLEPHRINE HYDROCHLORIDE 40 MCG/MIN: 10 INJECTION INTRAVENOUS at 11:40

## 2020-08-23 RX ADMIN — PIPERACILLIN AND TAZOBACTAM 3.38 G: 3; .375 INJECTION, POWDER, LYOPHILIZED, FOR SOLUTION INTRAVENOUS at 12:30

## 2020-08-23 RX ADMIN — PREGABALIN 300 MG: 75 CAPSULE ORAL at 20:30

## 2020-08-23 RX ADMIN — ALBUMIN (HUMAN) 25 G: 0.25 INJECTION, SOLUTION INTRAVENOUS at 12:29

## 2020-08-23 RX ADMIN — FUROSEMIDE 20 MG: 20 INJECTION, SOLUTION INTRAMUSCULAR; INTRAVENOUS at 00:15

## 2020-08-23 RX ADMIN — LAMOTRIGINE 150 MG: 100 TABLET ORAL at 10:41

## 2020-08-23 RX ADMIN — ATORVASTATIN CALCIUM 20 MG: 20 TABLET, FILM COATED ORAL at 22:36

## 2020-08-23 RX ADMIN — PIPERACILLIN AND TAZOBACTAM 3.38 G: 3; .375 INJECTION, POWDER, LYOPHILIZED, FOR SOLUTION INTRAVENOUS at 20:44

## 2020-08-23 RX ADMIN — FAMOTIDINE 20 MG: 20 TABLET, FILM COATED ORAL at 20:30

## 2020-08-23 RX ADMIN — PREGABALIN 300 MG: 75 CAPSULE ORAL at 10:40

## 2020-08-23 NOTE — ROUTINE PROCESS
1600 
Bedside shift change report given to [id number] [place] [address] (oncoming nurse) by Karolina Adair RN (offgoing nurse). Report included the following information SBAR, Kardex, Intake/Output and MAR.

## 2020-08-23 NOTE — CONSULTS
[facility]  Pulmonary, Critical Care, and Sleep Medicine    ICU Consult    Name: Angela Lugo MRN: [id number]   : 1960 Hospital: [facility] [place]   Date: 2020  Room: [facility] [place]     Subjective: This patient has been seen and evaluated at the request of Dr.J Lenka Adkins for ICU transfer for hypotension. Patient is a 62 y.o. female with history of chronic low back pains, and underwent lumbar laminectomy on 2020. She has prior history of coronary artery disease, and CABG done about 2 years ago at [facility]. The patient reports that her cardiologist is Dr. Barber. She has obesity, but denies sleep apnea diagnosis. She is a chronic smoker, currently smoking 4 cigars a day. She reports history of COPD. She denies being on home oxygen therapy. No recent steroid use reported. SARS-CoV-2 test negative prior to surgery, and patient reports being at home, and not interacting with people. She reports no contact with family or friends with coronavirus. Patient has been on chronic pain medications at home. The patient was hypotensive yesterday and moved to the ICU. Because of tachycardia, Levophed could not be used. She is on phenylephrinecurrently 60 mcg/min. Patient use CPAP empirically last night, but reports that she did not like it. She is currently on nasal cannula oxygen. She complains of dyspnea on mild exertion in the bed currently. She has some cough, and bringing up thick sputum. She denies any chest pain or hemoptysis. Afebrile overnight. Urine output 900 mL overnight with Lasix. Patient overall fluid positive by 7 L. It appears patient got 9 L fluid on surgery day. Surgery  Date of Procedure: 2020   Preoperative Diagnosis: LUMBAR SPONDYLOSIS WITH RADICULOPATHY,LUMBOSACRAL SPONDYLOLISTHESIS  Procedure: L3,L4 primary laminectomy and revison laminectomy L5 for decompression. L3-5 posterolateral,interbody and instrumented fusion.  Exploration of fusion L5-S1 (posterior solid), Grafting, Fluoro. Increased difficulty all components secondary to morbid obesity, retained hardware, increased surgical and retractor time. Surgeon: Asya Figueroa DO,Surgical Assistant: Deepthi Hong PA-C  Estimated Blood Loss: 850cc with 250cc returned. Past Medical History:   Diagnosis Date    Anemia     Arthritis     back    Asthma     life long    Autoimmune disease ([facility] 2013    Fibromyalgia    Bipolar 1 disorder, depressed ([facility]     Bronchitis     CAD (coronary artery disease)     Chronic obstructive pulmonary disease ([facility]     2017    Chronic pain     back, all over    Depression     HTN (hypertension), benign     30 years    Hypertension     Liver disease     states had hepatitis and was treated after childbirth and a bloof transfusion 20 years ago    PTSD (post-traumatic stress disorder)       Past Surgical History:   Procedure Laterality Date    ABDOMEN SURGERY PROC UNLISTED      [facility]    BREAST SURGERY PROCEDURE UNLISTED      reduction    CARDIAC SURG PROCEDURE UNLIST      3 vessel bypass at [facility] [place]- 2018    DELIVERY       ENDOMETRIAL ABLATION, THERMAL      GASTRIC BYPASS,OBESITY,W/SM BOWEL RECONS      HX APPENDECTOMY      years ago    HX  SECTION      HX CHOLECYSTECTOMY      years ago    HX GASTRIC BYPASS      Dr John Noriega      HX HYSTERECTOMY      years ago    HX LUMBAR DISKECTOMY        Prior to Admission medications    Medication Sig Start Date End Date Taking? Authorizing Provider   albuterol (PROVENTIL HFA, VENTOLIN HFA, PROAIR HFA) 90 mcg/actuation inhaler Take  by inhalation every four (4) hours as needed for Wheezing. Yes Provider, Historical   umeclidinium brm/vilanterol tr (ANORO ELLIPTA IN) Take  by inhalation daily. Yes Provider, Historical   ARIPiprazole (Abilify) 2 mg tablet Take 2 mg by mouth nightly.    Yes Provider, Historical   chlorthalidone (HYGROTEN) 25 mg tablet Take 25 mg by mouth daily. Yes Provider, Historical   chlorzoxazone (Parafon Forte DSC) 500 mg tablet Take 500 mg by mouth nightly as needed for Muscle Spasm(s). Yes Provider, Historical   clobetasoL (TEMOVATE) 0.05 % ointment Apply  to affected area two (2) times daily as needed for Skin Irritation. Yes Provider, Historical   clopidogreL (Plavix) 75 mg tab Take 75 mg by mouth daily. Yes Provider, Historical   Omeprazole delayed release (PRILOSEC D/R) 20 mg tablet Take 20 mg by mouth two (2) times a day. Yes Provider, Historical   potassium chloride SA (MICRO-K) 10 mEq capsule Take 10 mEq by mouth daily. Yes Provider, Historical   pregabalin (Lyrica) 300 mg capsule Take 300 mg by mouth two (2) times a day. Indications: disorder characterized by stiff, tender & painful muscles, repeated episodes of anxiety   Yes Provider, Historical   OTHER Indications: Nicotene patch daily   Yes Provider, Historical   atorvastatin (LIPITOR) 20 mg tablet Take 20 mg by mouth nightly. Yes Provider, Historical   aspirin 81 mg chewable tablet Take 81 mg by mouth daily. Yes Provider, Historical   albuterol (PROVENTIL VENTOLIN) 2.5 mg /3 mL (0.083 %) nebulizer solution by Nebulization route every four (4) hours as needed for Wheezing. Yes Provider, Historical   fluticasone (FLONASE) 50 mcg/actuation nasal spray 2 Sprays by Both Nostrils route two (2) times a day. Yes Provider, Historical   furosemide (LASIX) 40 mg tablet Take 40 mg by mouth every other day. Indications: visible water retention   Yes Provider, Historical   metoprolol tartrate (LOPRESSOR) 50 mg tablet Take 50 mg by mouth two (2) times a day. Yes Other, MD Sánchez   losartan (COZAAR) 100 mg tablet Take 100 mg by mouth daily. Yes Other, MD Sánchez   lamoTRIgine (LAMICTAL) 100 mg tablet Take 150 mg by mouth two (2) times a day.  Indications: DEPRESSION ASSOCIATED WITH BIPOLAR DISORDER   Yes Other, MD Sánchez   clonazePAM (KLONOPIN) 0.5 mg tablet Take 1 mg by mouth three (3) times daily as needed. Indications: usually takes once   Yes Other, MD Sánchez   gabapentin (NEURONTIN) 800 mg tablet Take 300 mg by mouth two (2) times a day. Indications: can take up to 3 capsules 3 times a day   Yes Provider, Historical   acetaminophen (TylenoL) 325 mg tablet Take  by mouth every four (4) hours as needed for Pain. Provider, Historical     Allergies   Allergen Reactions    Effexor [Venlafaxine] Nausea and Vomiting    Hydrocodone Nausea and Vomiting      Social History     Tobacco Use    Smoking status: Light Tobacco Smoker     Packs/day: 1.00    Smokeless tobacco: Never Used    Tobacco comment: advised none 24 hours pre-op    Substance Use Topics    Alcohol use: Yes     Comment: rare      No family history on file.      Current Facility-Administered Medications   Medication Dose Route Frequency    albumin human 25% (BUMINATE) solution 25 g  25 g IntraVENous Q6H    perflutren lipid microspheres (DEFINITY) in NS bolus IV  1 mL IntraVENous RAD ONCE    [Held by provider] metoprolol tartrate (LOPRESSOR) tablet 12.5 mg  12.5 mg Oral Q12H    levoFLOXacin (LEVAQUIN) tablet 500 mg  500 mg Oral Q24H    piperacillin-tazobactam (ZOSYN) 3.375 g in 0.9% sodium chloride (MBP/ADV) 100 mL MBP  3.375 g IntraVENous Q6H    Vancomycin -Pharmacy to Dose  1 Each Other Rx Dosing/Monitoring    vancomycin (VANCOCIN) 1750 mg in  ml infusion  1,750 mg IntraVENous Q12H    sodium chloride 0.9 % bolus infusion 1,000 mL  1,000 mL IntraVENous ONCE    PHENYLephrine (KAREN-SYNEPHRINE) 30 mg in 0.9% sodium chloride 250 mL infusion   mcg/min IntraVENous TITRATE    sodium chloride (NS) flush 5-40 mL  5-40 mL IntraVENous Q8H    famotidine (PEPCID) tablet 20 mg  20 mg Oral Q12H    ARIPiprazole (ABILIFY) tablet 2 mg  2 mg Oral QHS    atorvastatin (LIPITOR) tablet 20 mg  20 mg Oral QHS    furosemide (LASIX) tablet 40 mg  40 mg Oral EVERY OTHER DAY    lamoTRIgine (LaMICtal) tablet 150 mg 150 mg Oral BID    potassium chloride (KLOR-CON) tablet 10 mEq  10 mEq Oral DAILY    pregabalin (LYRICA) capsule 300 mg  300 mg Oral BID    umeclidinium-vilanterol (ANORO ELLIPTA) 62.5 mcg- 25 mcg/inhalation (Patient Supplied)  1 Puff Inhalation DAILY       Latest lactic acid:   Lactic acid   Date Value Ref Range Status   2020 0.9 0.4 - 2.0 MMOL/L Final   2020 1.9 0.4 - 2.0 MMOL/L Final   2020 2.9 (HH) 0.4 - 2.0 MMOL/L Final     Comment:     CALLED TO AND CORRECTLY REPEATED BY:  DEONTE PEOPLES 2S ON 2020 AT 0445 TO JUR         Review of Systems:  Limited due to patient condition. Patient has low back pains. She took oxycodone orally this morning. No nausea or vomiting or diarrhea. No abdominal pains. No fever or chills. No chest pain or hemoptysis. Objective:   Vital Signs:    Visit Vitals  /65   Pulse 88   Temp 99 °F (37.2 °C)   Resp 18   Ht 5' 4\" (1.626 m)   Wt 123.8 kg (273 lb)   SpO2 97%   BMI 46.86 kg/m²       O2 Device: Nasal cannula   O2 Flow Rate (L/min): 2 l/min   Temp (24hrs), Av.5 °F (37.5 °C), Min:99 °F (37.2 °C), Max:100.2 °F (37.9 °C)       Intake/Output:   Last shift:      No intake/output data recorded. Last 3 shifts:  1901 -  0700  In: 3139 [P.O.:150;  I.V.:2989]  Out: 65 [Urine:1600; Drains:10]    Intake/Output Summary (Last 24 hours) at 2020 0804  Last data filed at 2020 0930  Gross per 24 hour   Intake 120 ml   Output    Net 120 ml         Physical Exam:   Comfortable; on 2 L nasal cannula oxygen; morbidly obese; acyanotic  HEENT: pupils not dilated, no scleral jaundice, moist oral mucosa, no nasal drainage  Neck: No adenopathy or thyroid swelling; obese neck  CVS: S1S2 no murmurs; JVD not elevated; no gallop rhythm; telemetry sinus rhythm  RS: Mod air entry bilaterally, obese chest; decreased BS at bases, mild bibasal crackles; no wheezes; symmetrical BS; not tachypneic   Abd: soft, non tender, no hepatosplenomegaly, no abd distension, no guarding or rigidity, bowel sounds heard, obese  Neuro: Awake, alert, moving all extremities   Back: Lumbar surgical dressing; no bleeding or discharge or hematoma noted   Extrm: no leg edema or swelling or clubbing  Skin: no rash  Lymphatic: no cervical or supraclavicular adenopathy  Psych: Patient appears mildly groggy, likely from pain medications      Data review:     Recent Results (from the past 24 hour(s))   EKG, 12 LEAD, INITIAL    Collection Time: 08/22/20  1:31 PM   Result Value Ref Range    Ventricular Rate 131 BPM    Atrial Rate 131 BPM    P-R Interval 160 ms    QRS Duration 68 ms    Q-T Interval 296 ms    QTC Calculation (Bezet) 437 ms    Calculated P Axis 53 degrees    Calculated R Axis 9 degrees    Calculated T Axis 103 degrees    Diagnosis       Sinus tachycardia  Possible Anterior infarct , age undetermined  Abnormal ECG  When compared with ECG of 17-AUG-2020 14:11,  Vent. rate has increased BY  52 BPM     LACTIC ACID    Collection Time: 08/22/20  6:35 PM   Result Value Ref Range    Lactic acid 0.9 0.4 - 2.0 MMOL/L   CBC WITH AUTOMATED DIFF    Collection Time: 08/22/20  6:35 PM   Result Value Ref Range    WBC 9.7 4.6 - 13.2 K/uL    RBC 3.04 (L) 4.20 - 5.30 M/uL    HGB 9.2 (L) 12.0 - 16.0 g/dL    HCT 28.8 (L) 35.0 - 45.0 %    MCV 94.7 74.0 - 97.0 FL    MCH 30.3 24.0 - 34.0 PG    MCHC 31.9 31.0 - 37.0 g/dL    RDW 16.6 (H) 11.6 - 14.5 %    PLATELET 555 755 - 051 K/uL    MPV 9.2 9.2 - 11.8 FL    NEUTROPHILS 75 (H) 40 - 73 %    LYMPHOCYTES 12 (L) 21 - 52 %    MONOCYTES 9 3 - 10 %    EOSINOPHILS 4 0 - 5 %    BASOPHILS 0 0 - 2 %    ABS. NEUTROPHILS 7.2 1.8 - 8.0 K/UL    ABS. LYMPHOCYTES 1.2 0.9 - 3.6 K/UL    ABS. MONOCYTES 0.9 0.05 - 1.2 K/UL    ABS. EOSINOPHILS 0.4 0.0 - 0.4 K/UL    ABS.  BASOPHILS 0.0 0.0 - 0.1 K/UL    DF AUTOMATED     METABOLIC PANEL, BASIC    Collection Time: 08/22/20  6:35 PM   Result Value Ref Range    Sodium 138 136 - 145 mmol/L    Potassium 4.0 3.5 - 5.5 mmol/L Chloride 106 100 - 111 mmol/L    CO2 28 21 - 32 mmol/L    Anion gap 4 3.0 - 18 mmol/L    Glucose 117 (H) 74 - 99 mg/dL    BUN 10 7.0 - 18 MG/DL    Creatinine 0.87 0.6 - 1.3 MG/DL    BUN/Creatinine ratio 11 (L) 12 - 20      GFR est AA >60 >60 ml/min/1.73m2    GFR est non-AA >60 >60 ml/min/1.73m2    Calcium 8.3 (L) 8.5 - 10.1 MG/DL   CARDIAC PANEL,(CK, CKMB & TROPONIN)    Collection Time: 08/22/20  6:35 PM   Result Value Ref Range    CK - MB <1.0 <3.6 ng/ml    CK-MB Index  0.0 - 4.0 %     CALCULATION NOT PERFORMED WHEN RESULT IS BELOW LINEAR LIMIT     26 - 192 U/L    Troponin-I, QT <0.02 0.0 - 4.063 NG/ML   METABOLIC PANEL, BASIC    Collection Time: 08/23/20  4:20 AM   Result Value Ref Range    Sodium 138 136 - 145 mmol/L    Potassium 4.0 3.5 - 5.5 mmol/L    Chloride 104 100 - 111 mmol/L    CO2 30 21 - 32 mmol/L    Anion gap 4 3.0 - 18 mmol/L    Glucose 97 74 - 99 mg/dL    BUN 10 7.0 - 18 MG/DL    Creatinine 0.96 0.6 - 1.3 MG/DL    BUN/Creatinine ratio 10 (L) 12 - 20      GFR est AA >60 >60 ml/min/1.73m2    GFR est non-AA 60 (L) >60 ml/min/1.73m2    Calcium 8.3 (L) 8.5 - 10.1 MG/DL   CBC WITH AUTOMATED DIFF    Collection Time: 08/23/20  4:20 AM   Result Value Ref Range    WBC 9.8 4.6 - 13.2 K/uL    RBC 2.72 (L) 4.20 - 5.30 M/uL    HGB 8.2 (L) 12.0 - 16.0 g/dL    HCT 25.8 (L) 35.0 - 45.0 %    MCV 94.9 74.0 - 97.0 FL    MCH 30.1 24.0 - 34.0 PG    MCHC 31.8 31.0 - 37.0 g/dL    RDW 16.4 (H) 11.6 - 14.5 %    PLATELET 299 573 - 231 K/uL    MPV 8.4 (L) 9.2 - 11.8 FL    NEUTROPHILS 75 (H) 40 - 73 %    LYMPHOCYTES 13 (L) 21 - 52 %    MONOCYTES 9 3 - 10 %    EOSINOPHILS 3 0 - 5 %    BASOPHILS 0 0 - 2 %    ABS. NEUTROPHILS 7.4 1.8 - 8.0 K/UL    ABS. LYMPHOCYTES 1.3 0.9 - 3.6 K/UL    ABS. MONOCYTES 0.9 0.05 - 1.2 K/UL    ABS. EOSINOPHILS 0.3 0.0 - 0.4 K/UL    ABS.  BASOPHILS 0.0 0.0 - 0.1 K/UL    DF AUTOMATED             No results for input(s): FIO2I, IFO2, HCO3I, IHCO3, HCOPOC, PCO2I, PCOPOC, IPHI, PHI, PHPOC, PO2I, PO2POC in the last 72 hours. No lab exists for component: IPOC2    All Micro Results     Procedure Component Value Units Date/Time    CULTURE, URINE [[id number]] Collected:  08/21/20 0400    Order Status:  Completed Specimen:  Urine from Clean catch Updated:  08/22/20 1032     Special Requests: NO SPECIAL REQUESTS        Culture result: No growth (<1,000 CFU/ML)       CULTURE, BLOOD [[id number]] Collected:  08/21/20 0356    Order Status:  Completed Specimen:  Blood Updated:  08/22/20 0739     Special Requests: NO SPECIAL REQUESTS        Culture result: NO GROWTH 1 DAY             Imaging:  [x]I have personally reviewed the patients chest radiographs images and report     Chest x-ray 8/23/2020  Results from Hospital Encounter encounter on 08/20/20   XR CHEST PORT    Narrative EXAM: CHEST RADIOGRAPH, SINGLE VIEW    CLINICAL INDICATION/HISTORY: CAD/CHF       <Additional:  None. COMPARISON: 8/22/2020    TECHNIQUE: Portable frontal view of the chest was obtained.     _______________    FINDINGS:    SUPPORT DEVICES: None. HEART AND MEDIASTINUM: Status post median sternotomy with discontinuity at the  most superior sternal wire suture again noted. Cardiac silhouette is top normal  in size. LUNGS AND PLEURAL SPACES: Pulmonary vessels are within normal range for the  portable technique and low degree of auditory effort. Mild streaking right lung  base has improved slightly. No acutely developing consolidation, pneumothorax or  pleural effusion. BONY THORAX AND SOFT TISSUES: No acute osseous abnormality. _______________      Impression IMPRESSION:    Mild right basilar atelectasis versus streaky infiltrate versus residual focal  interstitial edema with slight improvement. CTA chest 8/22/2020  [place] [facility]   CTA CHEST W OR W WO CONT    Narrative EXAM: CTA Chest    INDICATION: Pain. COMPARISON: None.     TECHNIQUE: Axial CT imaging from the thoracic inlet through the diaphragm with  intravenous contrast. Coronal and sagittal MIP reformats were generated. One or more dose reduction techniques were used on this CT: automated exposure  control, adjustment of the mAs and/or kVp according to patient's size, and  iterative reconstruction techniques. The specific techniques utilized on this CT  exam have been documented in the patient's electronic medical record.  _______________    FINDINGS:    EXAM QUALITY: Overall exam quality is adequate    PULMONARY ARTERIES: No evidence of pulmonary embolism. MEDIASTINUM: Normal heart size. No evidence of right heart strain. Aorta is  unremarkable. No pericardial effusion. LYMPH NODES: No enlarged nodes. AIRWAY: Normal.    LUNGS: No suspicious nodule or mass. Streaky opacities in both lungs suggesting  minor atelectasis most likely. Ashley Frizzle PLEURA: Normal. Specifically, no pneumothorax or pleural effusion. UPPER ABDOMEN: Unremarkable. OTHER: No acute or aggressive osseous abnormalities identified. _______________      Impression IMPRESSION: No evidence of PE. No acute pulmonary process identified. IMPRESSION:   · Shocklikely combination of sepsis and cardiac R57.9  · SepsisUTI A41.9, I22.7  · Acute diastolic CHFEF not known I50.31  · Hypoxemialikely due to atelectasis and CHF- R09.02, Z99.81  · Coronary artery disease- I25.10  · Status post CABG- Z95.1  · COPD- J44.9  · Chronic cigar smoker- F17.290  · Morbid obesity- E66.01  · ALLEN- G47.33  · Hypertension history- I10  · Lumbar spinal stenosis- M48.061  · Status post lumbar surgery- Z98.1  · History of bipolar disorder, PTSD- F31.9, F43.10    ·       RECOMMENDATIONS:   · Pulm: Stable respirations; on 2 L nasal cannula oxygen; wean FiO2 for oxygen saturation greater than 91%; incentive spirometry. CTA chest negative for PEs. · Bronchodilators: Budesonide 0.5 mg twice daily; prn DuoNeb  · Okay for CPAP at nighttime for now as tolerated.   Outpatient sleep study recommended. · Cardiac: CTA chest and chest x-ray shows mild pulmonary vascular congestion/CHF findings, and bibasal atelectasis; echocardiogram done this morningresults awaited; troponin was negative; proBNP elevated yesterday; repeat cardiac panel this morning, along with proBNP; check TSH and cortisol; Lasix 20 mg twice daily for now; currently on phenylephrine wean for systolic blood pressure greater than 95 mmHg; if patient remains pressor dependent, would need central line and arterial line. Consult cardiology as wellpatient known to Dr. Sharifa Franklin. Clopidogrel on hold due to lumbar spinal surgery. Patient on statin. Metoprolol on hold due to hypotension. · ID: Follow blood and urine cultures; would stop vancomycin since highly unlikely to have MRSA infection; CT chest shows no focal consolidations or pneumonia; continue levofloxacin and Zosyn for now. Check CRP. · Renal: Watch I/O's; follow renal function, and replace electrolytes as needed. · GI: Oral diet as tolerated. ProphylaxisPepcid. · Neuro: Stable mentation  · Psych: Patient on outpatient medications with Abilify, Lamictal and Lyrica. · Hem: Watch hemoglobin and platelet; check coags; transfuse for hemoglobin less than 7 g/dL. · Endo: Blood sugar seems stable on labs. · Vasc: Check ultrasound lower extremities postop state  · MS: Postop lumbar management per Ortho team, including decisions for imaging, wound care and screening for infection locally; called and discussed with Ortho PA regarding DVT prophylaxis with subcutaneous heparin; he would discuss with attending and call ICU if okay. · Fluids: No maintenance IV fluids; albumin every 6 for nowdiscontinue once hypotension resolved  · Nutrition: Oral diet as tolerated  · Proph:  DVt and GI prophSCDs and Pepcid  · I have discussed with patient, and updated medical management.     · Discussed with ICU team.  Will defer respective systems problem management to primary and other consultant and follow patient in ICU with primary and other medical team  Further recommendations will be based on the patient's response to recommended treatment and results of the investigation ordered. Quality Care: PPI, DVT prophylaxis, HOB elevated, Infection control all reviewed and addressed. PAIN AND SEDATION: Judicious pain medications balancing pain and blood pressure. Prn Narcan.    · Skin/Wound: no active issues  · Nutrition:  Oral diet as tolerated  · Prophylaxis: DVT and GI Prophylaxis reviewed  · Restraints: none  · PT/OT eval and treat: as needed  · Lines/Tubes: PIVs; external urinary catheter  ADVANCE DIRECTIVE: Full Code    · Thank you for the consult     High complexity decision making was performed in this consultation and evaluation of this patient who is at high risk for decompensation with multiple organ involvement  Total critical care time spent rendering care exclusive of procedures: [facility]       Ying Quiroga MD

## 2020-08-23 NOTE — PROGRESS NOTES
Problem: Mobility Impaired (Adult and Pediatric)  Goal: *Acute Goals and Plan of Care (Insert Text)  Description: In 1-4 days pt will be able to perform:  STG  1. Bed mobility:  Demonstrate proper log-roll technique for safe initiation of rolling for OOB activities. 2.  Supine to sit to supine S with HR for meals. 3.  Sit to stand to sit S with RW/LSO in prep for ambulation. LT.  Gait:  Ambulate 150ft S with RW/LSO, for home/community mobility. 2.  Activity tolerance: Tolerate up in chair 30 minutes-1 hour for ADL's.  3.  Patient/Family Education:  Patient/family to be independent with HEP for follow-up care and safe discharge. Outcome: Not Progressing Towards Goal     PT session held due to:  []  Nausea/vomiting  [x]  Communication with hospitalist (Varying BPs and A-line being placed)   []  Extreme Pain  []  Dialysis treatment in progress. Will f/u, when cleared to resume. Thank you.   [place]remont Ahumada, PTA

## 2020-08-23 NOTE — PROGRESS NOTES
0720: Bedside and Verbal shift change report received from Samuel Orellana RN. Report included the following information SBAR, Kardex, Procedure Summary, Intake/Output, MAR, Recent Results, Med Rec Status, Cardiac Rhythm Sinus Tach and Alarm Parameters . 1024 Assisted physician in placement of ART line. 1100 Patient stated walker in room is not hers, it is a hospital walker hers is in room 226 or 227 behind the door. Called telemetry, they checked and no patient walker was in either room. 1342 Patient refused central line placement. Bronson-synephrine discontinued.

## 2020-08-23 NOTE — CONSULTS
[facility] Consult Note      Patient: Mya Freitas MRN: [id number]  SSN: [id number]    YOB: 1960  Age: 62 y.o. Sex: female    Date of Consultation: 2020  Referring Physician:    Reason for Consultation: Hypotension    Chief complain: S/p lumbar laminectomy 2020    HPI: 60-year-old female underwent lumbar laminectomy on 2020 admitted to ICU with hypotension. Patient denies any chest pain or shortness of breath. She denies any dizziness, palpitation, presyncope or syncope. She is complaining of back pain. She denies any recent nausea, vomiting or diarrhea. She has known coronary artery disease and had CABG done 2 years ago. She is current smoker. She denies any abuse. Cardiology consult called for hypotension.     Past Medical History:   Diagnosis Date    Anemia     Arthritis     back    Asthma     life long    Autoimmune disease ([facility] 2013    Fibromyalgia    Bipolar 1 disorder, depressed ([facility]     Bronchitis     CAD (coronary artery disease)     Chronic obstructive pulmonary disease ([facility]     2017    Chronic pain     back, all over    Depression     HTN (hypertension), benign     30 years    Hypertension     Liver disease     states had hepatitis and was treated after childbirth and a bloof transfusion 20 years ago    PTSD (post-traumatic stress disorder)      Past Surgical History:   Procedure Laterality Date    ABDOMEN SURGERY PROC UNLISTED      [facility]    BREAST SURGERY PROCEDURE UNLISTED      reduction    CARDIAC SURG PROCEDURE UN[place]      [place] [facility] [place]    DELIVERY       ENDOMETRIAL ABLATION, THERMAL      GASTRIC BYPASS,OBESITY,W/SM BOWEL RECONS      HX APPENDECTOMY      years ago    HX  SECTION      HX CHOLECYSTECTOMY      years ago    HX GASTRIC BYPASS      Dr Yenny Blunt      HX HYSTERECTOMY      years ago    HX LUMBAR DISKECTOMY  2006     Current Facility-Administered Medications   Medication Dose Route Frequency    albumin human 25% (BUMINATE) solution 25 g  25 g IntraVENous Q6H    furosemide (LASIX) injection 20 mg  20 mg IntraVENous BID    budesonide (PULMICORT) 500 mcg/2 ml nebulizer suspension  500 mcg Nebulization BID RT    albuterol-ipratropium (DUO-NEB) 2.5 MG-0.5 MG/3 ML  3 mL Nebulization Q4H PRN    piperacillin-tazobactam (ZOSYN) 3.375 g in 0.9% sodium chloride (MBP/ADV) 100 mL MBP  3.375 g IntraVENous Q8H    oxyCODONE IR (ROXICODONE) tablet 5 mg  5 mg Oral Q6H PRN    NOREPINephrine (LEVOPHED) 8 mg in 0.9% NS 250ml infusion  0.5-30 mcg/min IntraVENous TITRATE    [Held by provider] metoprolol tartrate (LOPRESSOR) tablet 12.5 mg  12.5 mg Oral Q12H    levoFLOXacin (LEVAQUIN) tablet 500 mg  500 mg Oral Q24H    acetaminophen (TYLENOL) tablet 1,000 mg  1,000 mg Oral Q8H PRN    sodium chloride (NS) flush 5-40 mL  5-40 mL IntraVENous Q8H    sodium chloride (NS) flush 5-40 mL  5-40 mL IntraVENous PRN    naloxone (NARCAN) injection 0.4 mg  0.4 mg IntraVENous PRN    ondansetron (ZOFRAN) injection 4 mg  4 mg IntraVENous Q4H PRN    diphenhydrAMINE (BENADRYL) injection 12.5 mg  12.5 mg IntraVENous Q4H PRN    famotidine (PEPCID) tablet 20 mg  20 mg Oral Q12H    cyclobenzaprine (FLEXERIL) tablet 10 mg  10 mg Oral TID PRN    bisacodyL (DULCOLAX) tablet 5 mg  5 mg Oral DAILY PRN    temazepam (RESTORIL) capsule 15 mg  15 mg Oral QHS PRN    albuterol (PROVENTIL HFA, VENTOLIN HFA, PROAIR HFA) inhaler 1 Puff (Patient Supplied)  1 Puff Inhalation Q4H PRN    ARIPiprazole (ABILIFY) tablet 2 mg  2 mg Oral QHS    atorvastatin (LIPITOR) tablet 20 mg  20 mg Oral QHS    clonazePAM (KlonoPIN) tablet 1 mg  1 mg Oral TID PRN    lamoTRIgine (LaMICtal) tablet 150 mg  150 mg Oral BID    potassium chloride (KLOR-CON) tablet 10 mEq  10 mEq Oral DAILY    pregabalin (LYRICA) capsule 300 mg  300 mg Oral BID       Allergies and Intolerances:    Allergies Allergen Reactions    Effexor [Venlafaxine] Nausea and Vomiting    Hydrocodone Nausea and Vomiting       Family History:   No family history on file. Social History:   She  reports that she has been smoking. She has been smoking about 1.00 pack per day. She has never used smokeless tobacco.  She  reports current alcohol use. Review of Systems:     Gen: No fever, chills, malaise, weight loss/gain. Heent: No headache, rhinorrhea, epistaxis, ear pain, hearing loss, sinus pain, neck pain/stiffness, sore throat. Heart: No chest pain, palpitations, shortness of breath on exertion, pnd, or orthopnea. Resp: No cough, hemoptysis, wheezing and dyspnea  GI: No nausea, vomiting, diarrhea, constipation, melena or hematochezia. : No urinary obstruction, dysuria or hematuria. Derm: No rash, new skin lesion or pruritis. Musc/skeletal: Positive bone or joint complains. Vasc: No edema, cyanosis or claudication. Endo: No heat/cold intolerance, no polyuria,polydipsia or polyphagia. Neuro: No unilateral weakness, numbness, tingling. No seizures. Heme: No easy bruising or bleeding.       Physical:   Patient Vitals for the past 6 hrs:   Temp Pulse Resp BP SpO2   08/23/20 1630  (!) 110 24  91 %   08/23/20 1615 99.1 °F (37.3 °C) (!) 109 19  (!) 89 %   08/23/20 1600  (!) 114 16  92 %   08/23/20 1530  (!) 111   94 %   08/23/20 1515  (!) 116   94 %   08/23/20 1501  (!) 110  (!) 81/55 95 %   08/23/20 1500  (!) 111 11  96 %   08/23/20 1445  (!) 113   95 %   08/23/20 1430  (!) 112 19  94 %   08/23/20 1415  (!) 110 14  98 %   08/23/20 1400  (!) 111 14  96 %   08/23/20 1345  (!) 110 23  97 %   08/23/20 1330  (!) 104 19  95 %   08/23/20 1315  (!) 105 19  96 %   08/23/20 1300  98 18  94 %   08/23/20 1245  98 21  95 %   08/23/20 1230  93 12  99 %   08/23/20 1215  87 16  98 %   08/23/20 1200  88 17  97 %   08/23/20 1145  90 16  96 %   08/23/20 1130  92 16  97 %   08/23/20 1115  95 15  97 %         Exam:   General Appearance: Comfortable, not using accessory muscles of respiration. HEENT: AMIRAH. HEAD: Atraumatic  NECK: No JVD, no thyroidomeglay. CAROTIDS: No bruit  LUNGS: Clear bilaterally. HEART: S1+S2 audible, no murmur, no pericardial rub. ABD: Non-tender, BS Audible    EXT: No edema, and no cysnosis. VASCULAR EXAM: Pulses are intact. PSYCHIATRIC EXAM: Mood is appropriate. MUSCULOSKELETAL: Grossly no joint deformity.   NEUROLOGICAL: AAO times 3, Motor and sensory sytem intact     Review of Data:   LABS:   Lab Results   Component Value Date/Time    WBC 9.2 08/23/2020 10:39 AM    HGB 8.1 (L) 08/23/2020 10:39 AM    HCT 25.1 (L) 08/23/2020 10:39 AM    PLATELET 254 51/91/1836 10:39 AM     Lab Results   Component Value Date/Time    Sodium 138 08/23/2020 04:20 AM    Potassium 4.0 08/23/2020 04:20 AM    Chloride 104 08/23/2020 04:20 AM    CO2 30 08/23/2020 04:20 AM    Glucose 97 08/23/2020 04:20 AM    BUN 10 08/23/2020 04:20 AM    Creatinine 0.96 08/23/2020 04:20 AM     Lab Results   Component Value Date/Time    Cholesterol, total 195 03/15/2018 08:15 AM    HDL Cholesterol 42 03/15/2018 08:15 AM    LDL, calculated 128.6 (H) 03/15/2018 08:15 AM    Triglyceride 122 03/15/2018 08:15 AM     No components found for: GPT  No results found for: HBA1C, HGBE8, EDH3KJZU, IIQ2SNGP      Cardiology Procedures:   Results for orders placed or performed during the hospital encounter of 08/20/20   EKG, 12 LEAD, INITIAL   Result Value Ref Range    Ventricular Rate 131 BPM    Atrial Rate 131 BPM    P-R Interval 160 ms    QRS Duration 68 ms    Q-T Interval 296 ms    QTC Calculation (Bezet) 437 ms    Calculated P Axis 53 degrees    Calculated R Axis 9 degrees    Calculated T Axis 103 degrees    Diagnosis       Poor data quality, interpretation may be adversely affected  Sinus tachycardia  Possible Anterior infarct , age undetermined  Abnormal ECG  Confirmed by Rosibel Carpenter MD, Tam Alcaraz ([id number]) on 8/23/2020 3:03:04 PM             Impression / Plan:    Patient Active Problem List   Diagnosis Code    HTN (hypertension), benign I10    Depression F32.9    Asthma J45.909    Bipolar 1 disorder, depressed ([address] F31.9    PTSD (post-traumatic stress disorder) F43.10    Back pain M54.9    Hx of CABG Z95.1    3-vessel CAD I25.10    Essential hypertension I10    Hyperlipidemia E78.5    Morbid obesity ([facility] E66.01    Lumbar spinal stenosis M48.061    Lumbar spondylosis M47.816    Radiculopathy of leg M54.10    Sepsis secondary to UTI ([facility] A41.9, N39.0    ROSANGELA (acute kidney injury) ([facility] [facility] N17.9    UTI (urinary tract infection) N39.0    S/P lumbar fusion Z98.1    Shock ([facility] R57.9    COPD (chronic obstructive pulmonary disease) ([facility] J44.9    ALLEN (obstructive sleep apnea) G47.33    Hypoxemia requiring supplemental oxygen R09.02, Z99.81    Acute diastolic CHF (congestive heart failure) ([facility]) I50.31    Cigar smoker F17.290       Echocardiogram is suboptimal but estimated ejection fraction is normal.  No pericardial effusion. Patient received 9 L of fluid on day of surgery. Hypotension is unlikely cardiogenic in origin. · Left Ventricle: Normal cavity size and systolic function (ejection fraction normal). Mild concentric hypertrophy. The estimated EF is Poor visualization of endocardial border estimated LVEF 55%. There is mild (grade 1) left ventricular diastolic dysfunction E/e' ratio = 9.83. Wall Scoring: The following segments are normal: basal anteroseptal, basal inferoseptal, basal inferolateral, basal anterolateral, mid anteroseptal, mid inferoseptal, mid inferolateral, mid anterolateral, apical septal, apical lateral and apex. Other segments could not be evaluated. · Left Atrium: Mildly dilated left atrium. · Tricuspid Valve: Tricuspid valve not well visualized. No stenosis. Tricuspid regurgitation is inadequate for estimation of right ventricular systolic pressure.     Serial EKG with cardiac enzymes every 6-8 hours ×3  Continue pressors as per pulmonary critical care. Continue statin. Aspirin is on hold due to recent surgery. Plan discussed with      38 minutes of critical care time spent in the direct evaluation and treatment of this high risk patient. The reason for providing this level of medical care for this critically ill patient was due a critical illness that impaired one or more vital organ systems such that there was a high probability of imminent or life threatening deterioration in the patients condition. This care involved high complexity decision making to assess, manipulate, and support vital system functions, to treat this degree vital organ system failure and to prevent further life threatening deterioration of the patients condition.     Signed By: Michael Negro MD     August 23, 2020

## 2020-08-23 NOTE — PROGRESS NOTES
Problem: Falls - Risk of  Goal: *Absence of Falls  Description: Document Nickolas Phipps Fall Risk and appropriate interventions in the flowsheet. Outcome: Progressing Towards Goal  Note: Fall Risk Interventions:  Mobility Interventions: Bed/chair exit alarm, Communicate number of staff needed for ambulation/transfer, Patient to call before getting OOB         Medication Interventions: Bed/chair exit alarm, Evaluate medications/consider consulting pharmacy, Patient to call before getting OOB, Teach patient to arise slowly    Elimination Interventions: Call light in reach, Bed/chair exit alarm, Toileting schedule/hourly rounds, Patient to call for help with toileting needs              Problem: Patient Education: Go to Patient Education Activity  Goal: Patient/Family Education  Outcome: Progressing Towards Goal     Problem: Patient Education: Go to Patient Education Activity  Goal: Patient/Family Education  Outcome: Progressing Towards Goal     Problem: Patient Education: Go to Patient Education Activity  Goal: Patient/Family Education  Outcome: Progressing Towards Goal     Problem: Pressure Injury - Risk of  Goal: *Prevention of pressure injury  Description: Document Kannan Scale and appropriate interventions in the flowsheet. Outcome: Progressing Towards Goal  Note: Pressure Injury Interventions:       Moisture Interventions: Absorbent underpads, Check for incontinence Q2 hours and as needed, Internal/External urinary devices, Minimize layers    Activity Interventions: Increase time out of bed, Pressure redistribution bed/mattress(bed type), PT/OT evaluation    Mobility Interventions: HOB 30 degrees or less, Pressure redistribution bed/mattress (bed type), PT/OT evaluation, Turn and reposition approx.  every two hours(pillow and wedges)    Nutrition Interventions: Document food/fluid/supplement intake                     Problem: Patient Education: Go to Patient Education Activity  Goal: Patient/Family Education  Outcome: Progressing Towards Goal     Problem: Pain  Goal: *Control of Pain  Outcome: Progressing Towards Goal     Problem: Patient Education: Go to Patient Education Activity  Goal: Patient/Family Education  Outcome: Progressing Towards Goal

## 2020-08-23 NOTE — PROGRESS NOTES
2010: TRANSFER - IN REPORT:    Verbal report received from [address] [facility],[address], RN(name) on Soraya Clarke  being received from [address] [place] [address] ([facility]) for change in patient condition(hypotensive)      Report consisted of patients Situation, Background, Assessment and   Recommendations(SBAR). Information from the following report(s) SBAR, Kardex, Procedure Summary, Intake/Output, MAR, Recent Results, Med Rec Status, Cardiac Rhythm Sinus Tach and Alarm Parameters  was reviewed with the receiving nurse. Opportunity for questions and clarification was provided. 2017: Assessment completed upon patients arrival to unit and care assumed. 2240: Preparing to transport to CT. Pt cooperative. 2258: Heading to radiology with medic for CT Chest W WO contrast.     7602: Returned from CT scan without incident. 0000: Reassessment completed. 0400: Reassessment completed. 0720: Bedside and Verbal shift change report given to ROXY Jimenez RN (oncoming nurse) by Rain Yañez RN (offgoing nurse). Report included the following information SBAR, Kardex, Procedure Summary, Intake/Output, MAR, Recent Results, Med Rec Status, Cardiac Rhythm Sinus Tach and Alarm Parameters .

## 2020-08-23 NOTE — ROUTINE PROCESS
Called by 2S RN to assist with urgent transfer to CT then ICU. Found Pt with radial pulse barely palpable. Automatic BP read 84'S systolic. Attempted to confirm with manual, pressure unobtainable. Per Dr Corry Marroquin, bypassed CT and transferred straight to ICU. Transferred Pt to ICU bed and placed Pt in [place] position to assess EJ access. Pt BP returned to within normal limits, but no suitable EJ site was found. Adjusted Pt to flat position. BP lowered somewhat but remained stable. Assisted with Pt positioning and comfort. Pt in care of ICU RICHELLE Mooney'tel.

## 2020-08-23 NOTE — PROCEDURES
[facility]  Arterial Line Procedure Note    Indication:  Shock   Coronary artery disease     Risks, benefits, alternatives explained and consent obtained from patient. Discussed risk of poor perfusion to distal extremities with complications especially in shock states. The critical nature of illness requires frequent BP monitoring. Patient RIGHT arm positioned in extended position. Full sterile barrier precautions used. Sterility Protocol followed. Teja's test done and shows ulnar artery perfusion. KIT: Arrow Radial Artery cath   Using palpation technique, 20 Ga cath placed and secured at 4.5 cm. RIGHT Radial Artery cannulated x 2 attempt(s) utilizing the Seldinger technique, and ultrasound guidance. Wire was removed. No immediate complications. Good pulsatile blood return, carefully flushed. Catheter secured & Biopatch applied. Sterile Tegaderm placed. OK to use line - good arterial waveform   Wean phenylephrine for systolic blood pressure greater than 95 mmHg, map greater than 60 mmHg.     Watch distal extremities for perfusion and skin integrity and color     Leno Wallace MD

## 2020-08-23 NOTE — PROGRESS NOTES
Zosyn (Piperacillin/Tazobactam) Extended Infusion    Deion Blanca, a 62 y.o. yo female, has been converted to an extended infusion of Zosyn while in the intensive care unit. A loading dose of 3.375 or 4.5 gm will be given over 30 minutes depending on indication. Extended infusions will begin 4 hours after the initial dose if CrCl  >/= 20 ml/min or 8 hours after the initial dose if CrCl < 20 ml/min. Extended infusions will run over 4 hours (240 minutes).     Dose changed to: Zosyn 3.375 g IV q8h over 240 minutes    Recent Labs     08/23/20  0420 08/22/20  1835 08/22/20  0211   CREA 0.96 0.87 0.93     Ht Readings from Last 1 Encounters:   08/23/20 162.6 cm (64\")     Wt Readings from Last 1 Encounters:   08/23/20 123.8 kg (273 lb)       CrCl : Serum creatinine: 0.96 mg/dL 08/23/20 0420  Estimated creatinine clearance: 82 mL/min    Renal adjustment of extended infusion of Zosyn  3.375 or 4.5 gm every 8 hours for CrCl >/= 20 ml/min  3.375 or 4.5 gm every 12 hours for CrCl < 20 ml/min, intermittent HD or PD

## 2020-08-23 NOTE — PROGRESS NOTES
Pharmacy Dosing Services: Vancomycin    Consult for Vancomycin Dosing by Pharmacy by Dr. Ada Yung provided for this 62y.o. year old female , for indication of Aspiration Pneumonia. Day of Therapy 1    Ht Readings from Last 1 Encounters:   08/20/20 162.6 cm (64\")        Wt Readings from Last 1 Encounters:   08/22/20 123.8 kg (273 lb)        Other Current Antibiotics Zosyn 3.375g IV every 6 hours   Significant Cultures pending   Serum Creatinine Lab Results   Component Value Date/Time    Creatinine 0.87 08/22/2020 06:35 PM      Creatinine Clearance Estimated Creatinine Clearance: 90.5 mL/min (based on SCr of 0.87 mg/dL). BUN Lab Results   Component Value Date/Time    BUN 10 08/22/2020 06:35 PM      WBC Lab Results   Component Value Date/Time    WBC 9.7 08/22/2020 06:35 PM      H/H Lab Results   Component Value Date/Time    HGB 9.2 (L) 08/22/2020 06:35 PM      Platelets Lab Results   Component Value Date/Time    PLATELET 234 89/93/7410 06:35 PM      Temp 99 °F (37.2 °C)     Start Vancomycin therapy, with loading dose of 2000 mg IV once on 8/22/2020 @2000. Followed with a maintenance dose of 1750 mg IV every 12 hours. Dose calculated to approximate a therapeutic trough of 15-20 mcg/mL. Pharmacy to follow daily and will make changes to dose and/or frequency based on clinical status. Pharmacist [facility] PharmD.  [phone]

## 2020-08-23 NOTE — PROGRESS NOTES
Progress Note      Patient: Marianna Sanches               Sex: female          DOA: 8/20/2020         YOB: 1960      Age:  62 y.o.        LOS:  LOS: 2 days     Procedure: Procedure(s):  LUMBAR 3-LUMBAR 5 LAMINECTOMY, INSTRUMENTED FUSION WITH CAGES, EXPLORATION OF LUMBAR 5 - SACRAL 1 FUSION, REMOVAL OF LUMBAR 5-SACRAL 1 HARDWARE WITH C-ARM  **SPEC POP**            Subjective:     Marianna Sanches is a 62 y.o. female who c/o stable, mild-to-moderate joint symptoms intermittently, reasonably well controlled by PRN meds. Noted overnight events and transfer to ICU. On vasopressors for hypotension. CTA neg for PE. Pending echo result. Objective:      Visit Vitals  /65   Pulse 88   Temp 99 °F (37.2 °C)   Resp 18   Ht 5' 4\" (1.626 m)   Wt 123.8 kg (273 lb)   SpO2 97%   BMI 46.86 kg/m²       Physical Exam:  A&O x3  VSS  HF/GS/QUADS/EHL/TA 5/5 bilateral  Calves nontender  Dressing: C/D/I    Intake and Output:  Current Shift:  No intake/output data recorded. Last three shifts:  08/21 1901 - 08/23 0700  In: 3139 [P.O.:150;  I.V.:2989]  Out: 1610 [Urine:1600; Drains:10]    Drain Output:    Lab/Data Reviewed:  CMP:   Lab Results   Component Value Date/Time     08/23/2020 04:20 AM    K 4.0 08/23/2020 04:20 AM     08/23/2020 04:20 AM    CO2 30 08/23/2020 04:20 AM    AGAP 4 08/23/2020 04:20 AM    GLU 97 08/23/2020 04:20 AM    BUN 10 08/23/2020 04:20 AM    CREA 0.96 08/23/2020 04:20 AM    GFRAA >60 08/23/2020 04:20 AM    GFRNA 60 (L) 08/23/2020 04:20 AM    CA 8.3 (L) 08/23/2020 04:20 AM     CBC:   Lab Results   Component Value Date/Time    WBC 9.8 08/23/2020 04:20 AM    HGB 8.2 (L) 08/23/2020 04:20 AM    HCT 25.8 (L) 08/23/2020 04:20 AM     08/23/2020 04:20 AM     All Cardiac Markers in the last 24 hours:   Lab Results   Component Value Date/Time     08/22/2020 06:35 PM    CKMB <1.0 08/22/2020 06:35 PM    CKND1  08/22/2020 06:35 PM     CALCULATION NOT PERFORMED WHEN RESULT IS BELOW LINEAR LIMIT    Gwynda Stallion <0.02 08/22/2020 06:35 PM     Medications Reviewed    Continued hospitalization is indicated due to medical needs      Assessment/Plan     Principal Problem:    Lumbar spinal stenosis (8/19/2020)    Active Problems:    HTN (hypertension), benign ()      Hyperlipidemia (3/15/2018)      Lumbar spondylosis (8/19/2020)      Radiculopathy of leg (8/19/2020)      Sepsis ([address] (8/21/2020)      ROSANGELA (acute kidney injury) ([address] (8/21/2020)      UTI (urinary tract infection) (8/21/2020)      S/P lumbar fusion (8/21/2020)      Shock ([address] (8/23/2020)        S/p Lumbar fusion   Continue meds per intensivist  CTA negative for PE  Pending echo result    Kodi Maharaj NP will be in to see patient tomorrow morning        Home vs SNF dependent upon hospital course

## 2020-08-23 NOTE — PROGRESS NOTES
Hospitalist Progress Note    Patient: Hiram Denny MRN: [id number]  CSN: [id number]    YOB: 1960  Age: 62 y.o. Sex: female    DOA: 8/20/2020 LOS:  LOS: 2 days            Assessment/Plan     1. Hypotension multifactorial- having variable BP readings  2. Possible UTI  3. CHF, echo pending  4. Sp lumbar surgery  5. HTN    Plan:  - continue antibiotics  - 2d echo  Pending  - for A line for more accurate readings  - cardiology consulted      Patient Active Problem List   Diagnosis Code    HTN (hypertension), benign I10    Depression F32.9    Asthma J45.909    Bipolar 1 disorder, depressed ([address] F31.9    PTSD (post-traumatic stress disorder) F43.10    Back pain M54.9    Hx of CABG Z95.1    3-vessel CAD I25.10    Essential hypertension I10    Hyperlipidemia E78.5    Morbid obesity ([address] E66.01    Lumbar spinal stenosis M48.061    Lumbar spondylosis M47.816    Radiculopathy of leg M54.10    Sepsis secondary to UTI ([address] A41.9, N39.0    ROSANGELA (acute kidney injury) ([address] N17.9    UTI (urinary tract infection) N39.0    S/P lumbar fusion Z98.1    Shock ([address] R57.9    COPD (chronic obstructive pulmonary disease) ([address] J44.9    ALLEN (obstructive sleep apnea) G47.33    Hypoxemia requiring supplemental oxygen R09.02, Z99.81    Acute diastolic CHF (congestive heart failure) ([facility]) I50.31    Cigar smoker F17.290               Subjective:    cc: hypotension  Pt hypotensive overnight requiring transfer to ICU  Co back pain      REVIEW OF SYSTEMS:  General: No fevers or chills. Cardiovascular: No chest pain or pressure. No palpitations. Pulmonary: No shortness of breath. Gastrointestinal: No nausea, vomiting.      Objective:        Vital signs/Intake and Output:  Visit Vitals  BP 92/53   Pulse (!) 105   Temp 98.9 °F (37.2 °C)   Resp 18   Ht 5' 4\" (1.626 m)   Wt 123.8 kg (273 lb)   SpO2 93%   BMI 46.86 kg/m²     Current Shift:  08/23 0701 - 08/23 1900  In: -   Out: 900 [Urine:900]  Last three shifts:  08/21 1901 - 08/23 0700  In: 3317.9 [P.O.:150; I.V.:3167.9]  Out: 1610 [Urine:1600; Drains:10]    Body mass index is 46.86 kg/m².     Physical Exam:  GEN: Alert and oriented times three NAD  EYES: conjunctiva normal, lids with out lesions  HEENT: MMM, No thyromegaly, no lymphadenopathy  HEART: RRR +S1 +S2, no JVD, pulses 2+ distally  LUNGS: CTA B/L no wheezes, rales or rhonchi  ABDOMEN: + BS, soft NT/ND no organomegaly,  No rebound  EXTREMITIES: No edema cyanosis, cap refill normal   SKIN: no rashes or skin breakdown, no nodules, normal turgor  Current Facility-Administered Medications   Medication Dose Route Frequency    albumin human 25% (BUMINATE) solution 25 g  25 g IntraVENous Q6H    furosemide (LASIX) injection 20 mg  20 mg IntraVENous BID    budesonide (PULMICORT) 500 mcg/2 ml nebulizer suspension  500 mcg Nebulization BID RT    albuterol-ipratropium (DUO-NEB) 2.5 MG-0.5 MG/3 ML  3 mL Nebulization Q4H PRN    [Held by provider] metoprolol tartrate (LOPRESSOR) tablet 12.5 mg  12.5 mg Oral Q12H    levoFLOXacin (LEVAQUIN) tablet 500 mg  500 mg Oral Q24H    piperacillin-tazobactam (ZOSYN) 3.375 g in 0.9% sodium chloride (MBP/ADV) 100 mL MBP  3.375 g IntraVENous Q6H    PHENYLephrine (KAREN-SYNEPHRINE) 30 mg in 0.9% sodium chloride 250 mL infusion   mcg/min IntraVENous TITRATE    acetaminophen (TYLENOL) tablet 1,000 mg  1,000 mg Oral Q8H PRN    oxyCODONE IR (ROXICODONE) tablet 10 mg  10 mg Oral Q6H PRN    sodium chloride (NS) flush 5-40 mL  5-40 mL IntraVENous Q8H    sodium chloride (NS) flush 5-40 mL  5-40 mL IntraVENous PRN    HYDROmorphone (PF) (DILAUDID) injection 1 mg  1 mg IntraVENous Q2H PRN    naloxone (NARCAN) injection 0.4 mg  0.4 mg IntraVENous PRN    ondansetron (ZOFRAN) injection 4 mg  4 mg IntraVENous Q4H PRN    diphenhydrAMINE (BENADRYL) injection 12.5 mg  12.5 mg IntraVENous Q4H PRN    famotidine (PEPCID) tablet 20 mg  20 mg Oral Q12H    cyclobenzaprine (FLEXERIL) tablet 10 mg  10 mg Oral TID PRN    bisacodyL (DULCOLAX) tablet 5 mg  5 mg Oral DAILY PRN    temazepam (RESTORIL) capsule 15 mg  15 mg Oral QHS PRN    albuterol (PROVENTIL HFA, VENTOLIN HFA, PROAIR HFA) inhaler 1 Puff (Patient Supplied)  1 Puff Inhalation Q4H PRN    ARIPiprazole (ABILIFY) tablet 2 mg  2 mg Oral QHS    atorvastatin (LIPITOR) tablet 20 mg  20 mg Oral QHS    clonazePAM (KlonoPIN) tablet 1 mg  1 mg Oral TID PRN    lamoTRIgine (LaMICtal) tablet 150 mg  150 mg Oral BID    potassium chloride (KLOR-CON) tablet 10 mEq  10 mEq Oral DAILY    pregabalin (LYRICA) capsule 300 mg  300 mg Oral BID         All the patient's labs over the past 24 hours were reviewed both during my initial daily workflow process and at the time notated as \"note time\" in [employer]. (It is not time stamped separately in this workflow.)  Select labs are listed below.         Labs: Results:       Chemistry Recent Labs     08/23/20 0420 08/22/20 1835 08/22/20 0211 08/21/20  0356   GLU 97 117* 111* 120*    138 139 139   K 4.0 4.0 3.7 4.2    106 107 105   CO2 30 28 28 29   BUN 10 10 15 27*   CREA 0.96 0.87 0.93 1.53*   CA 8.3* 8.3* 7.9* 7.8*   AGAP 4 4 4 5   BUCR 10* 11* 16 18   AP  --   --   --  103   TP  --   --   --  5.1*   ALB  --   --   --  2.5*   GLOB  --   --   --  2.6   AGRAT  --   --   --  1.0      CBC w/Diff Recent Labs     08/23/20 0420 08/22/20 1835 08/22/20 0211 08/21/20  0356   WBC 9.8 9.7 10.5 11.3   RBC 2.72* 3.04* 2.98* 3.48*   HGB 8.2* 9.2* 9.0* 10.4*   HCT 25.8* 28.8* 27.4* 32.0*    205 191 216   GRANS 75* 75*  --  83*   LYMPH 13* 12*  --  11*   EOS 3 4  --  0      Cardiac Enzymes Recent Labs     08/22/20  1835      CKND1 CALCULATION NOT PERFORMED WHEN RESULT IS BELOW LINEAR LIMIT          Lipid Panel Lab Results   Component Value Date/Time    Cholesterol, total 195 03/15/2018 08:15 AM    HDL Cholesterol 42 03/15/2018 08:15 AM    LDL, calculated 128.6 (H) 03/15/2018 08:15 AM    VLDL, calculated 24.4 03/15/2018 08:15 AM    Triglyceride 122 03/15/2018 08:15 AM    CHOL/HDL Ratio 4.6 03/15/2018 08:15 AM          Liver Enzymes Recent Labs     08/21/20  0356   TP 5.1*   ALB 2.5*             Procedures/imaging: see electronic medical records for all procedures/Xrays and details which were not copied into this note but were reviewed prior to creation of Parish Kapoor DO  Internal Medicine/Geriatrics

## 2020-08-23 NOTE — ROUTINE PROCESS
2010 TRANSFER - OUT REPORT: 
 
Verbal report given to Marie RN (name) on Flower Newman  being transferred to ICU(unit) for urgent transfer Report consisted of patients Situation, Background, Assessment and  
Recommendations(SBAR). Information from the following report(s) SBAR, Kardex, Intake/Output and MAR was reviewed with the receiving nurse. Lines:  
Peripheral IV 08/20/20 Right Hand (Active) Site Assessment Clean, dry, & intact 08/22/20 7289 Phlebitis Assessment 1 08/22/20 0817 Infiltration Assessment 0 08/22/20 0817 Dressing Status Clean, dry, & intact 08/22/20 6941 Dressing Type Transparent;Tape 08/22/20 0817 Hub Color/Line Status Capped 08/22/20 0218 Action Taken Open ports on tubing capped 08/22/20 0817 Alcohol Cap Used Yes 08/22/20 7405 Peripheral IV 08/21/20 Left Forearm (Active) Site Assessment Clean, dry, & intact 08/22/20 2030 Phlebitis Assessment 0 08/22/20 0817 Infiltration Assessment 0 08/22/20 0817 Dressing Status Clean, dry, & intact 08/22/20 0170 Dressing Type Transparent;Tape 08/22/20 0817 Hub Color/Line Status Infusing 08/22/20 0817 Action Taken Open ports on tubing capped 08/22/20 0817 Alcohol Cap Used Yes 08/22/20 6148 Peripheral IV 08/22/20 Right Forearm (Active) Opportunity for questions and clarification was provided. Patient transported with: 
 Registered Nurse Tech

## 2020-08-24 PROBLEM — R06.83 SNORING: Status: ACTIVE | Noted: 2020-08-24

## 2020-08-24 LAB
ANION GAP SERPL CALC-SCNC: 2 MMOL/L (ref 3–18)
BUN SERPL-MCNC: 11 MG/DL (ref 7–18)
BUN/CREAT SERPL: 11 (ref 12–20)
CALCIUM SERPL-MCNC: 8.7 MG/DL (ref 8.5–10.1)
CHLORIDE SERPL-SCNC: 104 MMOL/L (ref 100–111)
CO2 SERPL-SCNC: 33 MMOL/L (ref 21–32)
CREAT SERPL-MCNC: 0.99 MG/DL (ref 0.6–1.3)
ERYTHROCYTE [DISTWIDTH] IN BLOOD BY AUTOMATED COUNT: 16 % (ref 11.6–14.5)
GLUCOSE SERPL-MCNC: 102 MG/DL (ref 74–99)
HCT VFR BLD AUTO: 24.5 % (ref 35–45)
HGB BLD-MCNC: 7.6 G/DL (ref 12–16)
MCH RBC QN AUTO: 30.2 PG (ref 24–34)
MCHC RBC AUTO-ENTMCNC: 31 G/DL (ref 31–37)
MCV RBC AUTO: 97.2 FL (ref 74–97)
PLATELET # BLD AUTO: 255 K/UL (ref 135–420)
PMV BLD AUTO: 8.8 FL (ref 9.2–11.8)
POTASSIUM SERPL-SCNC: 3.7 MMOL/L (ref 3.5–5.5)
RBC # BLD AUTO: 2.52 M/UL (ref 4.2–5.3)
SODIUM SERPL-SCNC: 139 MMOL/L (ref 136–145)
WBC # BLD AUTO: 7.4 K/UL (ref 4.6–13.2)

## 2020-08-24 PROCEDURE — 85027 COMPLETE CBC AUTOMATED: CPT

## 2020-08-24 PROCEDURE — 36415 COLL VENOUS BLD VENIPUNCTURE: CPT

## 2020-08-24 PROCEDURE — 97530 THERAPEUTIC ACTIVITIES: CPT

## 2020-08-24 PROCEDURE — 74011250636 HC RX REV CODE- 250/636: Performed by: FAMILY MEDICINE

## 2020-08-24 PROCEDURE — 97535 SELF CARE MNGMENT TRAINING: CPT

## 2020-08-24 PROCEDURE — 74011250636 HC RX REV CODE- 250/636: Performed by: INTERNAL MEDICINE

## 2020-08-24 PROCEDURE — 74011250637 HC RX REV CODE- 250/637: Performed by: INTERNAL MEDICINE

## 2020-08-24 PROCEDURE — 74011250637 HC RX REV CODE- 250/637: Performed by: FAMILY MEDICINE

## 2020-08-24 PROCEDURE — 74011000258 HC RX REV CODE- 258: Performed by: INTERNAL MEDICINE

## 2020-08-24 PROCEDURE — P9047 ALBUMIN (HUMAN), 25%, 50ML: HCPCS | Performed by: FAMILY MEDICINE

## 2020-08-24 PROCEDURE — 74011250637 HC RX REV CODE- 250/637: Performed by: PHYSICIAN ASSISTANT

## 2020-08-24 PROCEDURE — 65270000029 HC RM PRIVATE

## 2020-08-24 PROCEDURE — 94640 AIRWAY INHALATION TREATMENT: CPT

## 2020-08-24 PROCEDURE — 74011000250 HC RX REV CODE- 250: Performed by: INTERNAL MEDICINE

## 2020-08-24 PROCEDURE — 80048 BASIC METABOLIC PNL TOTAL CA: CPT

## 2020-08-24 PROCEDURE — 97116 GAIT TRAINING THERAPY: CPT

## 2020-08-24 RX ORDER — BUDESONIDE 0.5 MG/2ML
500 INHALANT ORAL
Status: DISCONTINUED | OUTPATIENT
Start: 2020-08-24 | End: 2020-08-24

## 2020-08-24 RX ORDER — IBUPROFEN 200 MG
1 TABLET ORAL DAILY
Status: DISCONTINUED | OUTPATIENT
Start: 2020-08-24 | End: 2020-08-25 | Stop reason: HOSPADM

## 2020-08-24 RX ADMIN — Medication 10 ML: at 16:30

## 2020-08-24 RX ADMIN — LAMOTRIGINE 150 MG: 100 TABLET ORAL at 08:35

## 2020-08-24 RX ADMIN — Medication 10 ML: at 06:53

## 2020-08-24 RX ADMIN — BUDESONIDE 500 MCG: 0.5 INHALANT RESPIRATORY (INHALATION) at 07:33

## 2020-08-24 RX ADMIN — FUROSEMIDE 20 MG: 10 INJECTION, SOLUTION INTRAMUSCULAR; INTRAVENOUS at 16:29

## 2020-08-24 RX ADMIN — ALBUMIN (HUMAN) 25 G: 0.25 INJECTION, SOLUTION INTRAVENOUS at 00:25

## 2020-08-24 RX ADMIN — BISACODYL 5 MG: 5 TABLET, COATED ORAL at 21:51

## 2020-08-24 RX ADMIN — ACETAMINOPHEN 1000 MG: 500 TABLET ORAL at 16:34

## 2020-08-24 RX ADMIN — BUDESONIDE 500 MCG: 0.5 INHALANT RESPIRATORY (INHALATION) at 21:12

## 2020-08-24 RX ADMIN — ATORVASTATIN CALCIUM 20 MG: 20 TABLET, FILM COATED ORAL at 21:51

## 2020-08-24 RX ADMIN — FAMOTIDINE 20 MG: 20 TABLET, FILM COATED ORAL at 20:44

## 2020-08-24 RX ADMIN — ALBUMIN (HUMAN) 25 G: 0.25 INJECTION, SOLUTION INTRAVENOUS at 06:41

## 2020-08-24 RX ADMIN — POTASSIUM CHLORIDE 10 MEQ: 750 TABLET, EXTENDED RELEASE ORAL at 08:36

## 2020-08-24 RX ADMIN — OXYCODONE 5 MG: 5 TABLET ORAL at 21:52

## 2020-08-24 RX ADMIN — PREGABALIN 300 MG: 75 CAPSULE ORAL at 08:35

## 2020-08-24 RX ADMIN — OXYCODONE 5 MG: 5 TABLET ORAL at 06:41

## 2020-08-24 RX ADMIN — VANCOMYCIN HYDROCHLORIDE 1750 MG: 10 INJECTION, POWDER, LYOPHILIZED, FOR SOLUTION INTRAVENOUS at 12:33

## 2020-08-24 RX ADMIN — PIPERACILLIN AND TAZOBACTAM 3.38 G: 3; .375 INJECTION, POWDER, LYOPHILIZED, FOR SOLUTION INTRAVENOUS at 20:44

## 2020-08-24 RX ADMIN — FAMOTIDINE 20 MG: 20 TABLET, FILM COATED ORAL at 08:35

## 2020-08-24 RX ADMIN — ALBUMIN (HUMAN) 25 G: 0.25 INJECTION, SOLUTION INTRAVENOUS at 23:23

## 2020-08-24 RX ADMIN — ALBUMIN (HUMAN) 25 G: 0.25 INJECTION, SOLUTION INTRAVENOUS at 18:55

## 2020-08-24 RX ADMIN — PIPERACILLIN AND TAZOBACTAM 3.38 G: 3; .375 INJECTION, POWDER, LYOPHILIZED, FOR SOLUTION INTRAVENOUS at 06:41

## 2020-08-24 RX ADMIN — PIPERACILLIN AND TAZOBACTAM 3.38 G: 3; .375 INJECTION, POWDER, LYOPHILIZED, FOR SOLUTION INTRAVENOUS at 17:26

## 2020-08-24 RX ADMIN — FUROSEMIDE 20 MG: 10 INJECTION, SOLUTION INTRAMUSCULAR; INTRAVENOUS at 08:35

## 2020-08-24 RX ADMIN — OXYCODONE 5 MG: 5 TABLET ORAL at 16:29

## 2020-08-24 NOTE — PROGRESS NOTES
Hospitalist Progress Note    Patient: Wagner Stone MRN: [id number]  CSN: [id number]    YOB: 1960  Age: 62 y.o. Sex: female    DOA: 8/20/2020 LOS:  LOS: 3 days                Assessment/Plan     Patient Active Problem List   Diagnosis Code    HTN (hypertension), benign I10    Depression F32.9    Asthma J45.909    Bipolar 1 disorder, depressed ([address] F31.9    PTSD (post-traumatic stress disorder) F43.10    Back pain M54.9    Hx of CABG Z95.1    3-vessel CAD I25.10    Essential hypertension I10    Hyperlipidemia E78.5    Morbid obesity ([facility] E66.01    Lumbar spinal stenosis M48.061    Lumbar spondylosis M47.816    Radiculopathy of leg M54.10    Sepsis secondary to UTI ([address] A41.9, N39.0    ROSANGELA (acute kidney injury) ([address] N17.9    UTI (urinary tract infection) N39.0    S/P lumbar fusion Z98.1    Shock ([address] R57.9    COPD (chronic obstructive pulmonary disease) ([address] J44.9    ALLEN (obstructive sleep apnea) G47.33    Hypoxemia requiring supplemental oxygen R09.02, Z99.81    Acute diastolic CHF (congestive heart failure) ([facility]) I50.31    Cigar smoker F17.290    Snoring R06.83            61 y/o female with postop fever and hypotension. Now feeling better, blood pressure stable. Hypertension-  Off phenylephrine, blood pressure stable. Seen by cardiology. CTA chest/chest x-ray with pulmonary vascular congestion, continue with Lasix. Antibiotics vancomycin, Zosyn, Levaquin. Blood and urine cultures no growth to date. CAD with history of CABG-  Beta-blocker on hold. Aspirin and Plavix on hold due to recent surgery. Echo with Ef of 55%, grade 1 DD. Review of systems  General: No fevers or chills. Cardiovascular: No chest pain or pressure. No palpitations. Pulmonary: No shortness of breath. Gastrointestinal: No nausea, vomiting. Physical Exam:  General: Awake, cooperative, no acute distress    HEENT: NC, Atraumatic.   PERRLA, anicteric sclerae. Lungs: CTA Bilaterally. No Wheezing/Rhonchi/Rales. Heart:  S1 S2,  No murmur, No Rubs, No Gallops  Abdomen: Soft, Non distended, Non tender.  +Bowel sounds,   Extremities: No c/c/e  Psych:   Not anxious or agitated. Neurologic:  No acute neurological deficit.            Vital signs/Intake and Output:  Visit Vitals  /71   Pulse (!) 112   Temp 98.3 °F (36.8 °C)   Resp 17   Ht 5' 4\" (1.626 m)   Wt 123.8 kg (273 lb)   SpO2 97%   BMI 46.86 kg/m²     Current Shift:  08/24 0701 - 08/24 1900  In: -   Out: 350 [Urine:350]  Last three shifts:  08/22 1901 - 08/24 0700  In: 778.9 [I.V.:778.9]  Out: 3400 [Urine:3400]            Labs: Results:       Chemistry Recent Labs     08/24/20 0355 08/23/20  1039 08/23/20  0420 08/22/20  1835   *  --  97 117*     --  138 138   K 3.7  --  4.0 4.0     --  104 106   CO2 33*  --  30 28   BUN 11  --  10 10   CREA 0.99  --  0.96 0.87   CA 8.7  --  8.3* 8.3*   AGAP 2*  --  4 4   BUCR 11*  --  10* 11*   AP  --  92  --   --    TP  --  5.8*  --   --    ALB  --  3.0*  --   --    GLOB  --  2.8  --   --    AGRAT  --  1.1  --   --       CBC w/Diff Recent Labs     08/24/20  0355 08/23/20  1039 08/23/20  0420 08/22/20  1835   WBC 7.4 9.2 9.8 9.7   RBC 2.52* 2.65* 2.72* 3.04*   HGB 7.6* 8.1* 8.2* 9.2*   HCT 24.5* 25.1* 25.8* 28.8*    195 188 205   GRANS  --   --  75* 75*   LYMPH  --   --  13* 12*   EOS  --   --  3 4      Cardiac Enzymes Recent Labs     08/23/20  1039 08/22/20  1835    182   CKND1 CALCULATION NOT PERFORMED WHEN RESULT IS BELOW LINEAR LIMIT CALCULATION NOT PERFORMED WHEN RESULT IS BELOW LINEAR LIMIT      Coagulation Recent Labs     08/23/20  1039   PTP 15.3*   INR 1.2   APTT 36.4       Lipid Panel Lab Results   Component Value Date/Time    Cholesterol, total 195 03/15/2018 08:15 AM    HDL Cholesterol 42 03/15/2018 08:15 AM    LDL, calculated 128.6 (H) 03/15/2018 08:15 AM    VLDL, calculated 24.4 03/15/2018 08:15 AM    Triglyceride 122 03/15/2018 08:15 AM    CHOL/HDL Ratio 4.6 03/15/2018 08:15 AM      BNP No results for input(s): BNPP in the last 72 hours.    Liver Enzymes Recent Labs     08/23/20  1039   TP 5.8*   ALB 3.0*   AP 92      Thyroid Studies Lab Results   Component Value Date/Time    TSH 0.74 08/23/2020 10:39 AM        Procedures/imaging: see electronic medical records for all procedures/Xrays and details which were not copied into this note but were reviewed prior to creation of Plan

## 2020-08-24 NOTE — PROGRESS NOTES
Problem: Mobility Impaired (Adult and Pediatric)  Goal: *Acute Goals and Plan of Care (Insert Text)  Description: In 1-4 days pt will be able to perform:  STG  1. Bed mobility:  Demonstrate proper log-roll technique for safe initiation of rolling for OOB activities. 2.  Supine to sit to supine S with HR for meals. 3.  Sit to stand to sit S with RW/LSO in prep for ambulation. LT.  Gait:  Ambulate 150ft S with RW/LSO, for home/community mobility. 2.  Activity tolerance: Tolerate up in chair 30 minutes-1 hour for ADL's.  3.  Patient/Family Education:  Patient/family to be independent with HEP for follow-up care and safe discharge. 2020 195 by Damir Jackson PTA  Outcome: Progressing Towards 130 [facility]       physical Therapy TREATMENT    Patient: Mayh Paredes (83 y.o. female)  Date: 2020  Diagnosis: Lumbar spinal stenosis [M48.061]  S/P lumbar fusion [Z98.1] Lumbar spinal stenosis  Procedure(s) (LRB):  LUMBAR 3-LUMBAR 5 LAMINECTOMY, INSTRUMENTED FUSION WITH CAGES, EXPLORATION OF LUMBAR 5 - SACRAL 1 FUSION, REMOVAL OF LUMBAR 5-SACRAL 1 HARDWARE WITH C-ARM  **SPEC POP** (N/A) 4 Days Post-Op  Precautions: Back, Fall, Spinal(LSO when OOB)   Chart, physical therapy assessment, plan of care and goals were reviewed. ASSESSMENT:  Pt is able to increase ambulation distance with slow pacement and VCs for postural improvement. Pt plans to use a recliner for rest and sleep, which will be beneficial due to lack of [place] with bed mobility. Pt 's son is present for session and feels confident that pt can safely return to home setting. Will cont with POC and improvement of general mobility on next treatment.     Progression toward goals:  [x]      Improving appropriately and progressing toward goals  []      Improving slowly and progressing toward goals  []      Not making progress toward goals and plan of care will be adjusted     PLAN:  Patient continues to benefit from skilled intervention to address the above impairments. Continue treatment per established plan of care. Discharge Recommendations:  Home Health  Further Equipment Recommendations for Discharge:  rolling walker and N/A     SUBJECTIVE:   Patient stated I'm ready for you now.     OBJECTIVE DATA SUMMARY:   Critical Behavior:  Neurologic State: Alert, Appropriate for age  Orientation Level: Oriented X4, Appropriate for age  Cognition: Appropriate decision making, Appropriate for age attention/concentration, Appropriate safety awareness, Follows commands  Safety/Judgement: Decreased awareness of environment, Decreased awareness of need for assistance, Decreased awareness of need for safety, Decreased insight into deficits  Functional Mobility Training:  Bed Mobility:  Rolling: Contact guard assistance;Minimum assistance  Supine to Sit: Contact guard assistance;Minimum assistance  Sit to Supine: Supervision;Minimum assistance  Scooting: Minimum assistance  Transfers:  Sit to Stand: Supervision  Stand to Sit: Supervision  Bed to Chair: Contact guard assistance; Adaptive equipment  Balance:  Sitting: Intact  Standing: Intact; With support  Standing - Static: Good  Standing - Dynamic : Good  Ambulation/Gait Training:  Distance (ft): 160 Feet (ft)  Assistive Device: Brace/Splint;Gait belt;Walker, rolling  Ambulation - Level of Assistance: Stand-by assistance  Gait Abnormalities: Antalgic;Decreased step clearance  Base of Support: Shift to right  Speed/Lucy: Slow  Step Length: Right shortened;Left shortened  Pain:  Pain Scale 1: Numeric (0 - 10)  Pain Intensity 1: 6   Pain out: 7  Pain Location 1: Back  Pain Orientation 1: Lower  Pain Description 1: Aching  Pain Intervention(s) 1: Rest  Activity Tolerance:   Good  Please refer to the flowsheet for vital signs taken during this treatment.   After treatment:   [] Patient left in no apparent distress sitting up in chair  [x] Patient left in no apparent distress in bed  [x] Call bell left within reach  [x] Nursing notified  [] Caregiver present  [] Bed alarm activated       Lucy, PTA   Time Calculation: 40 mins

## 2020-08-24 NOTE — ROUTINE PROCESS
Bedside and Verbal shift change report given to Ignacia peña RN (oncoming nurse) by KISHAN Alcantara RN (offgoing nurse). Report included the following information SBAR, Kardex, OR Summary, Intake/Output, MAR and Recent Results.

## 2020-08-24 NOTE — PROGRESS NOTES
Progress Note      Patient: Asa Robles               Sex: female          DOA: 8/20/2020         YOB: 1960      Age:  62 y.o.        LOS:  LOS: 3 days     Procedure: Procedure(s):  LUMBAR 3-LUMBAR 5 LAMINECTOMY, INSTRUMENTED FUSION WITH CAGES, EXPLORATION OF LUMBAR 5 - SACRAL 1 FUSION, REMOVAL OF LUMBAR 5-SACRAL 1 HARDWARE WITH C-ARM  **SPEC POP**            Subjective:     Asa Robles is a 62 y.o. female who c/o stable, mild-to-moderate joint symptoms intermittently, reasonably well controlled by PRN meds. Patient alert and oriented at bedside with blood pressure normalizing without need for levophed infusion. Patient verbalized wanting to get out of bed. Objective:      Visit Vitals  /58   Pulse 90   Temp 98.3 °F (36.8 °C)   Resp 18   Ht 5' 4\" (1.626 m)   Wt 123.8 kg (273 lb)   SpO2 99%   BMI 46.86 kg/m²       Physical Exam:  A&O x3  VSS  HF/GS/QUADS/EHL/TA 5/5 bilateral  Calves nontender      Dressing: C/D/I    Intake and Output:  Current Shift:  No intake/output data recorded. Last three shifts:  08/22 1901 - 08/24 0700  In: 778.9 [I.V.:778.9]  Out: 3400 [Urine:3400]    Drain Output:    Lab/Data Reviewed: All lab results for the last 24 hours reviewed. Medications Reviewed    Continued hospitalization is indicated due to continued therapy needs.        Assessment/Plan     Principal Problem:    Lumbar spinal stenosis (8/19/2020)    Active Problems:    HTN (hypertension), benign ()      Hx of CABG (3/15/2018)      Hyperlipidemia (3/15/2018)      Lumbar spondylosis (8/19/2020)      Radiculopathy of leg (8/19/2020)      Sepsis secondary to UTI ([address] (8/21/2020)      ROSANGELA (acute kidney injury) ([address] (8/21/2020)      UTI (urinary tract infection) (8/21/2020)      S/P lumbar fusion (8/21/2020)      Shock ([address] (8/23/2020)      COPD (chronic obstructive pulmonary disease) ([address] (8/23/2020)      ALLEN (obstructive sleep apnea) (8/23/2020)      Hypoxemia requiring supplemental oxygen (8/23/2020)      Acute diastolic CHF (congestive heart failure) ([facility] (8/23/2020)      Cigar smoker (8/23/2020)        Plan:   Patient will continue to be off pressors with pending transfer to floor. Patient will work with PT twice daily today and tomorrow. Possible discharge tomorrow versus Wednesday with stable blood pressures.      Home

## 2020-08-24 NOTE — PROGRESS NOTES
Problem: Falls - Risk of  Goal: *Absence of Falls  Description: Document [place] Fall Risk and appropriate interventions in the flowsheet. Outcome: Progressing Towards Goal  Note: Fall Risk Interventions:  Mobility Interventions: Bed/chair exit alarm, Patient to call before getting OOB, Communicate number of staff needed for ambulation/transfer         Medication Interventions: Bed/chair exit alarm, Patient to call before getting OOB, Evaluate medications/consider consulting pharmacy    Elimination Interventions: Call light in reach, Bed/chair exit alarm, Toileting schedule/hourly rounds, Patient to call for help with toileting needs              Problem: Patient Education: Go to Patient Education Activity  Goal: Patient/Family Education  Outcome: Progressing Towards Goal     Problem: Patient Education: Go to Patient Education Activity  Goal: Patient/Family Education  Outcome: Progressing Towards Goal     Problem: Patient Education: Go to Patient Education Activity  Goal: Patient/Family Education  Outcome: Progressing Towards Goal     Problem: Pressure Injury - Risk of  Goal: *Prevention of pressure injury  Description: Document Kannan Scale and appropriate interventions in the flowsheet. Outcome: Progressing Towards Goal  Note: Pressure Injury Interventions:       Moisture Interventions: Apply protective barrier, creams and emollients, Assess need for specialty bed, Internal/External urinary devices, Minimize layers    Activity Interventions: Pressure redistribution bed/mattress(bed type), PT/OT evaluation, Increase time out of bed    Mobility Interventions: HOB 30 degrees or less, Pressure redistribution bed/mattress (bed type), Turn and reposition approx.  every two hours(pillow and wedges)    Nutrition Interventions: Document food/fluid/supplement intake                     Problem: Patient Education: Go to Patient Education Activity  Goal: Patient/Family Education  Outcome: Progressing Towards Goal Problem: Pain  Goal: *Control of Pain  Outcome: Progressing Towards Goal     Problem: Patient Education: Go to Patient Education Activity  Goal: Patient/Family Education  Outcome: Progressing Towards Goal

## 2020-08-24 NOTE — PROGRESS NOTES
[facility]  Pulmonary, Critical Care, and Sleep Medicine    ICU Consult    Name: Keith Hodgkins MRN: [id number]   : 1960 Hospital: [facility] [place]   Date: 2020  Room: [facility]     Subjective: This patient has been seen and evaluated at the request of Dr.J Ely Dixon for ICU transfer for hypotension. Patient is a 62 y.o. female with history of chronic low back pains, and underwent lumbar laminectomy on 2020. She has prior history of coronary artery disease, and CABG done about 2 years ago at [facility] [place]. The patient reports that her cardiologist is Dr. Roxana Mackey. She has obesity, but denies sleep apnea diagnosis. She is a chronic smoker, currently smoking 4 cigars a day. She reports history of COPD. She denies being on home oxygen therapy. No recent steroid use reported. SARS-CoV-2 test negative prior to surgery, and patient reports being at home, and not interacting with people. She reports no contact with family or friends with coronavirus. Patient has been on chronic pain medications at home. The patient was hypotensive yesterday and moved to the ICU. Because of tachycardia, Levophed could not be used. She is on phenylephrinecurrently 60 mcg/min. Patient use CPAP empirically last night, but reports that she did not like it. She is currently on nasal cannula oxygen. She complains of dyspnea on mild exertion in the bed currently. She has some cough, and bringing up thick sputum. She denies any chest pain or hemoptysis. Afebrile overnight. Urine output 900 mL overnight with Lasix. Patient overall fluid positive by 7 L. It appears patient got 9 L fluid on surgery day. Surgery  Date of Procedure: 2020   Preoperative Diagnosis: LUMBAR SPONDYLOSIS WITH RADICULOPATHY,LUMBOSACRAL SPONDYLOLISTHESIS  Procedure: L3,L4 primary laminectomy and revison laminectomy L5 for decompression. L3-5 posterolateral,interbody and instrumented fusion.  Exploration of fusion L5-S1 (posterior solid), Grafting, Fluoro. Increased difficulty all components secondary to morbid obesity, retained hardware, increased surgical and retractor time. Surgeon: Thais Hyman DO,Surgical Assistant: Khushi Shipley PA-C  Estimated Blood Loss: 850cc with 250cc returned.   lenard is awake and alert on Ra at night @L of oxygen refused cPAP  I recommend outpatient sleep study  BP stable  Off pressors  Transfer to ortho floor  Continue diuresis  Smoker continue bronchodilators  Smoker gave nicotine patch  Avoid oversedation painkillers    Past Medical History:   Diagnosis Date    Anemia     Arthritis     back    Asthma     life long    Autoimmune disease ([facility] [address] 2013    Fibromyalgia    Bipolar 1 disorder, depressed ([facility] [address]     Bronchitis     CAD (coronary artery disease)     Chronic obstructive pulmonary disease ([facility] [address]     2017    Chronic pain     back, all over    Depression     HTN (hypertension), benign     30 years    Hypertension     Liver disease     states had hepatitis and was treated after childbirth and a bloof transfusion 20 years ago    PTSD (post-traumatic stress disorder)       Past Surgical History:   Procedure Laterality Date    ABDOMEN SURGERY PROC UNLISTED  2008    [facility]    BREAST SURGERY PROCEDURE UNLISTED      reduction    CARDIAC SURG PROCEDURE UNLIST      3 vessel bypass [facility] [place]    DELIVERY       ENDOMETRIAL ABLATION, THERMAL      GASTRIC BYPASS,OBESITY,W/SM BOWEL RECONS      HX APPENDECTOMY      years ago    HX  SECTION      HX CHOLECYSTECTOMY      years ago    HX GASTRIC BYPASS      Dr Tia Oppenheim      HX HYSTERECTOMY      years ago    HX LUMBAR DISKECTOMY        Prior to Admission medications    Medication Sig Start Date End Date Taking?  Authorizing Provider   albuterol (PROVENTIL HFA, VENTOLIN HFA, PROAIR HFA) 90 mcg/actuation inhaler Take  by inhalation every four (4) hours as needed for Wheezing. Yes Provider, Historical   umeclidinium brm/vilanterol tr (ANORO ELLIPTA IN) Take  by inhalation daily. Yes Provider, Historical   ARIPiprazole (Abilify) 2 mg tablet Take 2 mg by mouth nightly. Yes Provider, Historical   chlorthalidone (HYGROTEN) 25 mg tablet Take 25 mg by mouth daily. Yes Provider, Historical   chlorzoxazone (Parafon Forte DSC) 500 mg tablet Take 500 mg by mouth nightly as needed for Muscle Spasm(s). Yes Provider, Historical   clobetasoL (TEMOVATE) 0.05 % ointment Apply  to affected area two (2) times daily as needed for Skin Irritation. Yes Provider, Historical   clopidogreL (Plavix) 75 mg tab Take 75 mg by mouth daily. Yes Provider, Historical   Omeprazole delayed release (PRILOSEC D/R) 20 mg tablet Take 20 mg by mouth two (2) times a day. Yes Provider, Historical   potassium chloride SA (MICRO-K) 10 mEq capsule Take 10 mEq by mouth daily. Yes Provider, Historical   pregabalin (Lyrica) 300 mg capsule Take 300 mg by mouth two (2) times a day. Indications: disorder characterized by stiff, tender & painful muscles, repeated episodes of anxiety   Yes Provider, Historical   OTHER Indications: Nicotene patch daily   Yes Provider, Historical   atorvastatin (LIPITOR) 20 mg tablet Take 20 mg by mouth nightly. Yes Provider, Historical   aspirin 81 mg chewable tablet Take 81 mg by mouth daily. Yes Provider, Historical   albuterol (PROVENTIL VENTOLIN) 2.5 mg /3 mL (0.083 %) nebulizer solution by Nebulization route every four (4) hours as needed for Wheezing. Yes Provider, Historical   fluticasone (FLONASE) 50 mcg/actuation nasal spray 2 Sprays by Both Nostrils route two (2) times a day. Yes Provider, Historical   furosemide (LASIX) 40 mg tablet Take 40 mg by mouth every other day. Indications: visible water retention   Yes Provider, Historical   metoprolol tartrate (LOPRESSOR) 50 mg tablet Take 50 mg by mouth two (2) times a day.    Yes Other, Phys, MD   losartan (COZAAR) 100 mg tablet Take 100 mg by mouth daily. Yes Other, MD Sánchez   lamoTRIgine (LAMICTAL) 100 mg tablet Take 150 mg by mouth two (2) times a day. Indications: DEPRESSION ASSOCIATED WITH BIPOLAR DISORDER   Yes Jessica, MD Sánchez   clonazePAM (KLONOPIN) 0.5 mg tablet Take 1 mg by mouth three (3) times daily as needed. Indications: usually takes once   Yes Sánchez Lopez MD   gabapentin (NEURONTIN) 800 mg tablet Take 300 mg by mouth two (2) times a day. Indications: can take up to 3 capsules 3 times a day   Yes Provider, Historical   acetaminophen (TylenoL) 325 mg tablet Take  by mouth every four (4) hours as needed for Pain. Provider, Historical     Allergies   Allergen Reactions    Effexor [Venlafaxine] Nausea and Vomiting    Hydrocodone Nausea and Vomiting      Social History     Tobacco Use    Smoking status: Light Tobacco Smoker     Packs/day: 1.00    Smokeless tobacco: Never Used    Tobacco comment: advised none 24 hours pre-op    Substance Use Topics    Alcohol use: Yes     Comment: rare      No family history on file.      Current Facility-Administered Medications   Medication Dose Route Frequency    nicotine (NICODERM CQ) 14 mg/24 hr patch 1 Patch  1 Patch TransDERmal DAILY    albumin human 25% (BUMINATE) solution 25 g  25 g IntraVENous Q6H    furosemide (LASIX) injection 20 mg  20 mg IntraVENous BID    budesonide (PULMICORT) 500 mcg/2 ml nebulizer suspension  500 mcg Nebulization BID RT    piperacillin-tazobactam (ZOSYN) 3.375 g in 0.9% sodium chloride (MBP/ADV) 100 mL MBP  3.375 g IntraVENous Q8H    NOREPINephrine (LEVOPHED) 8 mg in 0.9% NS 250ml infusion  0.5-30 mcg/min IntraVENous TITRATE    vancomycin (VANCOCIN) 1750 mg in  ml infusion  1,750 mg IntraVENous Q12H    Vancomycin- dosed by pharmacy  1 Each Other Rx Dosing/Monitoring    [Held by provider] metoprolol tartrate (LOPRESSOR) tablet 12.5 mg  12.5 mg Oral Q12H    sodium chloride (NS) flush 5-40 mL  5-40 mL IntraVENous Q8H  famotidine (PEPCID) tablet 20 mg  20 mg Oral Q12H    ARIPiprazole (ABILIFY) tablet 2 mg  2 mg Oral QHS    atorvastatin (LIPITOR) tablet 20 mg  20 mg Oral QHS    lamoTRIgine (LaMICtal) tablet 150 mg  150 mg Oral BID    potassium chloride (KLOR-CON) tablet 10 mEq  10 mEq Oral DAILY    pregabalin (LYRICA) capsule 300 mg  300 mg Oral BID       Latest lactic acid:   Lactic acid   Date Value Ref Range Status   2020 0.8 0.4 - 2.0 MMOL/L Final   2020 0.9 0.4 - 2.0 MMOL/L Final   2020 1.9 0.4 - 2.0 MMOL/L Final       Review of Systems:  Limited due to patient condition. Patient has low back pains. She took oxycodone orally this morning. No nausea or vomiting or diarrhea. No abdominal pains. No fever or chills. No chest pain or hemoptysis. Objective:   Vital Signs:    Visit Vitals  /74   Pulse (!) 102   Temp 98.3 °F (36.8 °C)   Resp 15   Ht 5' 4\" (1.626 m)   Wt 123.8 kg (273 lb)   SpO2 91%   BMI 46.86 kg/m²       O2 Device: Room air   O2 Flow Rate (L/min): (weaned)   Temp (24hrs), Av.7 °F (37.1 °C), Min:98.2 °F (36.8 °C), Max:99.3 °F (37.4 °C)       Intake/Output:   Last shift:      No intake/output data recorded.   Last 3 shifts:  1901 -  0700  In: 778.9 [I.V.:778.9]  Out: 3400 [Urine:3400]    Intake/Output Summary (Last 24 hours) at 2020 0902  Last data filed at 2020 1848  Gross per 24 hour   Intake 600 ml   Output 2500 ml   Net -1900 ml         Physical Exam:   Comfortable; on 2 L nasal cannula oxygen; morbidly obese; acyanotic  HEENT: pupils not dilated, no scleral jaundice, moist oral mucosa, no nasal drainage  Neck: No adenopathy or thyroid swelling; obese neck  CVS: S1S2 no murmurs; JVD not elevated; no gallop rhythm; telemetry sinus rhythm  RS: Mod air entry bilaterally, obese chest; decreased BS at bases, mild bibasal crackles; no wheezes; symmetrical BS; not tachypneic   Abd: soft, non tender, no hepatosplenomegaly, no abd distension, no guarding or rigidity, bowel sounds heard, obese  Neuro: Awake, alert, moving all extremities   Back: Lumbar surgical dressing; no bleeding or discharge or hematoma noted   Extrm: no leg edema or swelling or clubbing  Skin: no rash  Lymphatic: no cervical or supraclavicular adenopathy  Psych: Patient appears mildly groggy, likely from pain medications      Data review:     Recent Results (from the past 24 hour(s))   POC G3    Collection Time: 08/23/20 10:28 AM   Result Value Ref Range    Device: NASAL CANNULA      Flow rate (POC) 2 L/M    pH (POC) 7.35 7.35 - 7.45      pCO2 (POC) 41.2 35.0 - 45.0 MMHG    pO2 (POC) 83 80 - 100 MMHG    HCO3 (POC) 22.9 22 - 26 MMOL/L    sO2 (POC) 96 92 - 97 %    Base deficit (POC) 3 mmol/L    Allens test (POC) N/A      Site DRAWN FROM ARTERIAL LINE      Specimen type (POC) ARTERIAL      Performed by Tram Ramsay + INR    Collection Time: 08/23/20 10:39 AM   Result Value Ref Range    Prothrombin time 15.3 (H) 11.5 - 15.2 sec    INR 1.2 0.8 - 1.2     PTT    Collection Time: 08/23/20 10:39 AM   Result Value Ref Range    aPTT 36.4 23.0 - 36.4 SEC   CARDIAC PANEL,(CK, CKMB & TROPONIN)    Collection Time: 08/23/20 10:39 AM   Result Value Ref Range    CK - MB <1.0 <3.6 ng/ml    CK-MB Index  0.0 - 4.0 %     CALCULATION NOT PERFORMED WHEN RESULT IS BELOW LINEAR LIMIT     26 - 192 U/L    Troponin-I, QT <0.02 0.0 - 0.045 NG/ML   LACTIC ACID    Collection Time: 08/23/20 10:39 AM   Result Value Ref Range    Lactic acid 0.8 0.4 - 2.0 MMOL/L   HEPATIC FUNCTION PANEL    Collection Time: 08/23/20 10:39 AM   Result Value Ref Range    Protein, total 5.8 (L) 6.4 - 8.2 g/dL    Albumin 3.0 (L) 3.4 - 5.0 g/dL    Globulin 2.8 2.0 - 4.0 g/dL    A-G Ratio 1.1 0.8 - 1.7      Bilirubin, total 0.6 0.2 - 1.0 MG/DL    Bilirubin, direct 0.3 (H) 0.0 - 0.2 MG/DL    Alk.  phosphatase 92 45 - 117 U/L    AST (SGOT) 13 10 - 38 U/L    ALT (SGPT) 10 (L) 13 - 56 U/L   TSH 3RD GENERATION    Collection Time: 08/23/20 10:39 AM   Result Value Ref Range    TSH 0.74 0.36 - 3.74 uIU/mL   CORTISOL    Collection Time: 08/23/20 10:39 AM   Result Value Ref Range    Cortisol, random 13.1 ug/dL   NT-PRO BNP    Collection Time: 08/23/20 10:39 AM   Result Value Ref Range    NT pro-BNP 1,417 (H) 0 - 900 PG/ML   C REACTIVE PROTEIN, QT    Collection Time: 08/23/20 10:39 AM   Result Value Ref Range    C-Reactive protein 21.4 (H) 0 - 0.3 mg/dL   CBC W/O DIFF    Collection Time: 08/23/20 10:39 AM   Result Value Ref Range    WBC 9.2 4.6 - 13.2 K/uL    RBC 2.65 (L) 4.20 - 5.30 M/uL    HGB 8.1 (L) 12.0 - 16.0 g/dL    HCT 25.1 (L) 35.0 - 45.0 %    MCV 94.7 74.0 - 97.0 FL    MCH 30.6 24.0 - 34.0 PG    MCHC 32.3 31.0 - 37.0 g/dL    RDW 16.5 (H) 11.6 - 14.5 %    PLATELET 993 248 - 842 K/uL    MPV 8.8 (L) 9.2 - 45.2 FL   METABOLIC PANEL, BASIC    Collection Time: 08/24/20  3:55 AM   Result Value Ref Range    Sodium 139 136 - 145 mmol/L    Potassium 3.7 3.5 - 5.5 mmol/L    Chloride 104 100 - 111 mmol/L    CO2 33 (H) 21 - 32 mmol/L    Anion gap 2 (L) 3.0 - 18 mmol/L    Glucose 102 (H) 74 - 99 mg/dL    BUN 11 7.0 - 18 MG/DL    Creatinine 0.99 0.6 - 1.3 MG/DL    BUN/Creatinine ratio 11 (L) 12 - 20      GFR est AA >60 >60 ml/min/1.73m2    GFR est non-AA 57 (L) >60 ml/min/1.73m2    Calcium 8.7 8.5 - 10.1 MG/DL   CBC W/O DIFF    Collection Time: 08/24/20  3:55 AM   Result Value Ref Range    WBC 7.4 4.6 - 13.2 K/uL    RBC 2.52 (L) 4.20 - 5.30 M/uL    HGB 7.6 (L) 12.0 - 16.0 g/dL    HCT 24.5 (L) 35.0 - 45.0 %    MCV 97.2 (H) 74.0 - 97.0 FL    MCH 30.2 24.0 - 34.0 PG    MCHC 31.0 31.0 - 37.0 g/dL    RDW 16.0 (H) 11.6 - 14.5 %    PLATELET 542 678 - 574 K/uL    MPV 8.8 (L) 9.2 - 11.8 FL           Recent Labs     08/23/20  1028   HCO3I 22.9   PCO2I 41.2   PHI 7.35   PO2I 83       All Micro Results     Procedure Component Value Units Date/Time    CULTURE, BLOOD [[id number]] Collected:  08/21/20 0356    Order Status:  Completed Specimen:  Blood Updated:  08/24/20 0703 Special Requests: NO SPECIAL REQUESTS        Culture result: NO GROWTH 3 DAYS       CULTURE, URINE [[id number]] Collected:  08/21/20 0400    Order Status:  Completed Specimen:  Urine from Clean catch Updated:  08/22/20 1032     Special Requests: NO SPECIAL REQUESTS        Culture result: No growth (<1,000 CFU/ML)             Imaging:  [x]I have personally reviewed the patients chest radiographs images and report     Chest x-ray 8/23/2020  Results from Hospital Encounter encounter on 08/20/20   XR CHEST PORT    Narrative EXAM: CHEST RADIOGRAPH, SINGLE VIEW    CLINICAL INDICATION/HISTORY: CAD/CHF       <Additional:  None. COMPARISON: 8/22/2020    TECHNIQUE: Portable frontal view of the chest was obtained.     _______________    FINDINGS:    SUPPORT DEVICES: None. HEART AND MEDIASTINUM: Status post median sternotomy with discontinuity at the  most superior sternal wire suture again noted. Cardiac silhouette is top normal  in size. LUNGS AND PLEURAL SPACES: Pulmonary vessels are within normal range for the  portable technique and low degree of auditory effort. Mild streaking right lung  base has improved slightly. No acutely developing consolidation, pneumothorax or  pleural effusion. BONY THORAX AND SOFT TISSUES: No acute osseous abnormality. _______________      Impression IMPRESSION:    Mild right basilar atelectasis versus streaky infiltrate versus residual focal  interstitial edema with slight improvement. CTA chest 8/22/2020  Results from [facility] Encounter encounter on 08/20/20   CTA CHEST W OR W WO CONT    Narrative EXAM: CTA Chest    INDICATION: Pain. COMPARISON: None. TECHNIQUE: Axial CT imaging from the thoracic inlet through the diaphragm with  intravenous contrast. Coronal and sagittal MIP reformats were generated.     One or more dose reduction techniques were used on this CT: automated exposure  control, adjustment of the mAs and/or kVp according to patient's size, and  iterative reconstruction techniques. The specific techniques utilized on this CT  exam have been documented in the patient's electronic medical record.  _______________    FINDINGS:    EXAM QUALITY: Overall exam quality is adequate    PULMONARY ARTERIES: No evidence of pulmonary embolism. MEDIASTINUM: Normal heart size. No evidence of right heart strain. Aorta is  unremarkable. No pericardial effusion. LYMPH NODES: No enlarged nodes. AIRWAY: Normal.    LUNGS: No suspicious nodule or mass. Streaky opacities in both lungs suggesting  minor atelectasis most likely. Geofm Dowse PLEURA: Normal. Specifically, no pneumothorax or pleural effusion. UPPER ABDOMEN: Unremarkable. OTHER: No acute or aggressive osseous abnormalities identified. _______________      Impression IMPRESSION: No evidence of PE. No acute pulmonary process identified. IMPRESSION:   · Shocklikely combination of sepsis and cardiac R57.9  · SepsisUTI A41.9, E35.8  · Acute diastolic CHFEF not known I50.31  · Hypoxemialikely due to atelectasis and CHF- R09.02, Z99.81  · Coronary artery disease- I25.10  · Status post CABG- Z95.1  · COPD- J44.9  · Chronic cigar smoker- F17.290  · Morbid obesity- E66.01  · ALLEN- G47.33  · Hypertension history- I10  · Lumbar spinal stenosis- M48.061  · Status post lumbar surgery- Z98.1  · History of bipolar disorder, PTSD- F31.9, F43.10    ·       RECOMMENDATIONS:   · Pulm: Stable respirations; on 2 L nasal cannula oxygen; wean FiO2 for oxygen saturation greater than 91%; incentive spirometry. CTA chest negative for PEs. · Bronchodilators: Budesonide 0.5 mg twice daily; prn DuoNeb  · Okay for CPAP at nighttime for now as tolerated. Outpatient sleep study recommended.   · Cardiac: CTA chest and chest x-ray shows mild pulmonary vascular congestion/CHF findings, and bibasal atelectasis; echocardiogram done this morningresults awaited; troponin was negative; proBNP elevated yesterday; repeat cardiac panel this morning, along with proBNP; check TSH and cortisol; Lasix 20 mg twice daily for now; currently on phenylephrine wean for systolic blood pressure greater than 95 mmHg; if patient remains pressor dependent, would need central line and arterial line. Consult cardiology as wellpatient known to Dr. Rebecca Leggett. Clopidogrel on hold due to lumbar spinal surgery. Patient on statin. Metoprolol on hold due to hypotension. · ID: Follow blood and urine cultures; would stop vancomycin since highly unlikely to have MRSA infection; CT chest shows no focal consolidations or pneumonia; continue levofloxacin and Zosyn for now. Check CRP. · Renal: Watch I/O's; follow renal function, and replace electrolytes as needed. · GI: Oral diet as tolerated. ProphylaxisPepcid. · Neuro: Stable mentation  · Psych: Patient on outpatient medications with Abilify, Lamictal and Lyrica. · Hem: Watch hemoglobin and platelet; check coags; transfuse for hemoglobin less than 7 g/dL. · Endo: Blood sugar seems stable on labs. · Vasc: Check ultrasound lower extremities postop state  · MS: Postop lumbar management per Ortho team, including decisions for imaging, wound care and screening for infection locally; called and discussed with Ortho PA regarding DVT prophylaxis with subcutaneous heparin; he would discuss with attending and call ICU if okay. · Fluids: No maintenance IV fluids; albumin every 6 for nowdiscontinue once hypotension resolved  · Nutrition: Oral diet as tolerated  · Proph:  DVt and GI prophSCDs and Pepcid  · I have discussed with patient, and updated medical management. · Discussed with ICU team.  Will defer respective systems problem management to primary and other consultant and follow patient in ICU with primary and other medical team  Further recommendations will be based on the patient's response to recommended treatment and results of the investigation ordered.   Quality Care: PPI, DVT prophylaxis, HOB elevated, Infection control all reviewed and addressed. PAIN AND SEDATION: Judicious pain medications balancing pain and blood pressure. Prn Narcan.    · Skin/Wound: no active issues  · Nutrition:  Oral diet as tolerated  · Prophylaxis: DVT and GI Prophylaxis reviewed  · Restraints: none  · PT/OT eval and treat: as needed  · Lines/Tubes: PIVs; external urinary catheter  ADVANCE DIRECTIVE: Full Code    · Thank you for the consult     High complexity decision making was performed in this consultation and evaluation of this patient who is at high risk for decompensation with multiple organ involvement  Total critical care time spent rendering care exclusive of procedures: [address]       Jossie Murphy MD

## 2020-08-24 NOTE — PROGRESS NOTES
Pharmacy Dosing Services: Zosyn   Pt was transferred from ICU ==> 2S  Indication: aspiration pneumonia   CrCl ~ 79.5 ml/min   Zosyn extended Infusion 3.375 gm IV q8h over 240 minutes was changed to Zosyn 3.375 gm IV q6h over 30 minutes per protocol

## 2020-08-24 NOTE — PROGRESS NOTES
1200 - Patient arrived to room 201 from ICU. Patient alert and oriented x4. Respirations even and unlabored. On room air at 95%. Lungs clear and diminished. Abdomen soft and obese. Ambulated to restroom and voids without difficulty. Able to move all extremities. Optifoam and mepilex to lower back CDI. Back brace in place. IV to right FA noted leaking and removed. IV to right AC infusing without difficulty. Patient sitting on edge of bed at this time with call light in reach. 1415 - IV to right AC noted leaking and painful. IV removed, dry dressing applied. 1610 - New 18 gauge IV started to left [facility] by medic after 3 attempts. Flushes without difficulty. 1620 - O2 sats 91% on room air. 2L NC applied. 1625 - O2 sats 95% on room air. [id number] - Patient ambulated to restroom, voided without difficulty, and returned to bed. PRN Tylenol administered for temp 99.9 F. Call light in reach.

## 2020-08-24 NOTE — PROGRESS NOTES
Problem: Self Care Deficits Care Plan (Adult)  Goal: *Acute Goals and Plan of Care (Insert Text)  Description: Initial Occupational Therapy Goals (8/20/2020) Within 7 day(s):    1. Patient will perform toilet transfer with SBA in preparation for bowel and bladder management. 2. Patient will perform bowel and bladder management with SBA for increased independence with ADLs. 3. Patient will perform UB dressing with SBA (including back brace) for increased independence with ADLs. 4. Patient will perform LB dressing with CGA & A/E PRN for increased independence with ADLs. 5. Patient will adhere to back/spinal precautions 100% of the time with 1-2 verbal cues for increased independence with ADLs. 6. Patient will utilize energy conservation techniques with 1 verbal cue(s) for increased independence with ADLs. Outcome: Progressing Towards Goal     OCCUPATIONAL THERAPY TREATMENT    Patient: Vera Weston (58 y.o. female)  Date: 8/24/2020  Diagnosis: Lumbar spinal stenosis [M48.061]  S/P lumbar fusion [Z98.1]   Lumbar spinal stenosis  Procedure(s) (LRB):  LUMBAR 3-LUMBAR 5 LAMINECTOMY, INSTRUMENTED FUSION WITH CAGES, EXPLORATION OF LUMBAR 5 - SACRAL 1 FUSION, REMOVAL OF LUMBAR 5-SACRAL 1 HARDWARE WITH C-ARM  **SPEC POP** (N/A) 4 Days Post-Op  Precautions: Back, Fall, Spinal(LSO when OOB)  PLOF: Patient was grossly mod I at baseline. Chart, occupational therapy assessment, plan of care, and goals were reviewed. ASSESSMENT:  Patient had transfer to ICU d/t hypotension. Pt eager to get up and patient being transferred to 2S. Pt able to sit EOB w/ CGA. Pt assisted w/ LSO and gown donning like shirt w/ modA. Pt able to stand CGA/Reji. Pt walked to door and assisted into bed for transport. Pt greatly limited in LE dressing, but mobilizing well w/ adequate balance for standing sinkside ADLs. Goals continue to be appropriate.   Progression toward goals:  []          Improving appropriately and progressing toward goals  [x]          Improving slowly and progressing toward goals  []          Not making progress toward goals and plan of care will be adjusted     PLAN:  Patient continues to benefit from skilled intervention to address the above impairments. Continue treatment per established plan of care. Discharge Recommendations:  Home Health  Further Equipment Recommendations for Discharge: To Be Determined (TBD) at next level of care      SUBJECTIVE:   Patient stated Oh, I'm glad I get to get up. They wouldn't let me.  (re: ICU staff)    OBJECTIVE DATA SUMMARY:   Cognitive/Behavioral Status:  Neurologic State: Alert  Orientation Level: Oriented X4  Cognition: Appropriate decision making  Safety/Judgement: Decreased awareness of environment, Decreased awareness of need for assistance, Decreased awareness of need for safety, Decreased insight into deficits    Functional Mobility and Transfers for ADLs:   Bed Mobility:  Rolling: Stand-by assistance;Contact guard assistance  Supine to Sit: Minimum assistance; Moderate assistance  Sit to Supine: Minimum assistance; Moderate assistance  Scooting: Minimum assistance   Transfers:  Sit to Stand: Contact guard assistance;Minimum assistance  Bed to Chair: Contact guard assistance; Adaptive equipment    Balance:  Sitting: Intact  Standing: Impaired; With support  Standing - Static: Good;Fair  Standing - Dynamic : Fair    ADL Intervention:  Feeding  Drink to Mouth: Independent    Upper Body Dressing Assistance  Orthotics(Brace): Moderate assistance  Hospital Gown: Moderate assistance    Pain:  Pain level pre-treatment: 5/10   Pain level post-treatment: 7/10  Pain Intervention(s): Medication administered by RN (see MAR); Rest, Ice, Repositioning   Response to intervention: Nurse notified, See doc flow sheet    Activity Tolerance:    Fair. Patient able to stand 1-2 minute(s). Patient able to complete ADLs with frequent rest breaks. Patient limited by pain, habitus, strength.      Please refer to the flowsheet for vital signs taken during this treatment. After treatment:   []  Patient left in no apparent distress sitting up in chair  [x]  Patient left in no apparent distress in bed  [x]  Call bell left within reach  [x]  Nursing notified  [x]  Caregiver present/RN/Levi  []  Bed alarm activated    COMMUNICATION/EDUCATION:   [x] Role of Occupational Therapy in the acute care setting  [x] Home safety education was provided and the patient/caregiver indicated understanding. [x] Patient/family have participated as able in working towards goals and plan of care. [x] Patient/family agree to work toward stated goals and plan of care. [] Patient understands intent and goals of therapy, but is neutral about his/her participation. [] Patient is unable to participate in goal setting and plan of care.       Thank you for this referral.  Fatimah Juarez, OTR/L, CSRS, CDCS, CFPS  Time Calculation: 16 mins

## 2020-08-24 NOTE — PROGRESS NOTES
Problem: Mobility Impaired (Adult and Pediatric)  Goal: *Acute Goals and Plan of Care (Insert Text)  Description: In 1-4 days pt will be able to perform:  STG  1. Bed mobility:  Demonstrate proper log-roll technique for safe initiation of rolling for OOB activities. 2.  Supine to sit to supine S with HR for meals. 3.  Sit to stand to sit S with RW/LSO in prep for ambulation. LT.  Gait:  Ambulate 150ft S with RW/LSO, for home/community mobility. 2.  Activity tolerance: Tolerate up in chair 30 minutes-1 hour for ADL's.  3.  Patient/Family Education:  Patient/family to be independent with HEP for follow-up care and safe discharge. Outcome: Not Progressing Towards Goal     PHYSICAL THERAPY TREATMENT    Patient: Flower Newman (84 y.o. female)  Date: 2020  Diagnosis: Lumbar spinal stenosis [M48.061]  S/P lumbar fusion [Z98.1]   Lumbar spinal stenosis  Procedure(s) (LRB):  LUMBAR 3-LUMBAR 5 LAMINECTOMY, INSTRUMENTED FUSION WITH CAGES, EXPLORATION OF LUMBAR 5 - SACRAL 1 FUSION, REMOVAL OF LUMBAR 5-SACRAL 1 HARDWARE WITH C-ARM  **SPEC POP** (N/A) 4 Days Post-Op  Precautions: Fall, Back   Chart, physical therapy assessment, plan of care and goals were reviewed. ASSESSMENT:  Session initiated to assist with transferring pt to new bed, for move to another floor. Pt requires assist and cuing for all activities. Pt with fair amb to new bed, with little pressure through RW. No significant VS changes and pt maintained 100% SPO2 on RA. RN present for session. Will F/u for demonstration of current endurance. Progression toward goals:  []      Improving appropriately and progressing toward goals  []      Improving slowly and progressing toward goals  [x]      Not making progress toward goals and plan of care will be adjusted     PLAN:  Patient continues to benefit from skilled intervention to address the above impairments. Continue treatment per established plan of care.   Discharge Recommendations:  [facility] or To Be Determined  Further Equipment Recommendations for Discharge:  brace/splint and rolling walker     SUBJECTIVE:   Patient stated That would be great.     OBJECTIVE DATA SUMMARY:   Critical Behavior:  Neurologic State: Alert  Orientation Level: Oriented X4  Cognition: Appropriate decision making  Safety/Judgement: Decreased awareness of environment, Decreased awareness of need for assistance, Decreased awareness of need for safety, Decreased insight into deficits  Functional Mobility Training:  Bed Mobility:  Rolling: Stand-by assistance;Contact guard assistance  Supine to Sit: Minimum assistance; Moderate assistance  Sit to Supine: Minimum assistance; Moderate assistance  Scooting: Minimum assistance  Transfers:  Sit to Stand: Contact guard assistance;Minimum assistance  Stand to Sit: Stand-by assistance  Balance:  Sitting: Intact  Standing: Impaired; With support  Standing - Static: Good;Fair  Standing - Dynamic : Fair  Ambulation/Gait Training:  Distance (ft): 12 Feet (ft)  Assistive Device: Brace/Splint;Gait belt;Walker, rolling  Ambulation - Level of Assistance: Stand-by assistance  Gait Abnormalities: Antalgic;Decreased step clearance  Base of Support: Widened  Speed/Lucy: Slow  Step Length: Right shortened;Left shortened  Therapeutic Exercises:   LAQ x 10   Pain:  Pain Scale 1: Numeric (0 - 10)  Pain in: 7  Pain out: 7   Activity Tolerance:   Fair+  Please refer to the flowsheet for vital signs taken during this treatment.   After treatment:   [] Patient left in no apparent distress sitting up in chair  [x] Patient left in no apparent distress in bed  [x] Call bell left within reach  [x] Nursing notified  [] Caregiver present  [] Bed alarm activated      Savita Schmitt PTA   Time Calculation: 25 mins

## 2020-08-24 NOTE — PROGRESS NOTES
conducted a Follow up consultation and Spiritual Assessment for Valentino Edouard, who is a 62 y.o.,female. Patient has 3 adult children. Youngest son lives in [place] but came down after she went into surgery. Son has to leave tomorrow morning to go back. Pt has not see him in over a year. I set up with RN Supervisor Rehana Yanez for son to come visit patient in her room this afternoon (once she has moved to ). Continued the relationship of care and support. Listened empathically. Offered prayer and assurance of continued prayer on patients behalf. Chart reviewed. Patient expressed gratitude for Spiritual Care visit. Patient was reviewed in ICU Interdisciplinary Rounds. Chaplains will continue to follow and will provide pastoral care as needed or requested.  recommends bedside caregivers page the  on duty if patient shows signs of acute spiritual or emotional distress. [address], [place].    Board Certified   [phone] - Office

## 2020-08-24 NOTE — PROGRESS NOTES
Problem: Falls - Risk of  Goal: *Absence of Falls  Description: Document Silvina Suarez Fall Risk and appropriate interventions in the flowsheet. Outcome: Progressing Towards Goal  Note: Fall Risk Interventions:  Mobility Interventions: Patient to call before getting OOB         Medication Interventions: Patient to call before getting OOB    Elimination Interventions: Call light in reach, Patient to call for help with toileting needs              Problem: Patient Education: Go to Patient Education Activity  Goal: Patient/Family Education  Outcome: Progressing Towards Goal     Problem: Patient Education: Go to Patient Education Activity  Goal: Patient/Family Education  Outcome: Progressing Towards Goal     Problem: Patient Education: Go to Patient Education Activity  Goal: Patient/Family Education  Outcome: Progressing Towards Goal     Problem: Pressure Injury - Risk of  Goal: *Prevention of pressure injury  Description: Document Kannan Scale and appropriate interventions in the flowsheet.   Outcome: Progressing Towards Goal  Note: Pressure Injury Interventions:       Moisture Interventions: Absorbent underpads    Activity Interventions: Increase time out of bed, Pressure redistribution bed/mattress(bed type), PT/OT evaluation    Mobility Interventions: Pressure redistribution bed/mattress (bed type), PT/OT evaluation    Nutrition Interventions: Document food/fluid/supplement intake                     Problem: Patient Education: Go to Patient Education Activity  Goal: Patient/Family Education  Outcome: Progressing Towards Goal     Problem: Pain  Goal: *Control of Pain  Outcome: Progressing Towards Goal     Problem: Patient Education: Go to Patient Education Activity  Goal: Patient/Family Education  Outcome: Progressing Towards Goal

## 2020-08-24 NOTE — PROGRESS NOTES
Transition of care: [facility] with [address] and family assist.       Chart reviewed and noted Pt has been transferred to ICU for hypotension. Plan is to continue to work with PT and stabilize blood pressure with dc home Tues vs Wed. CM following Pt's progress with therapy to determine if a new plan of transition is needed, she has been unable to participate due to BP concerns. CM remains available for assistance. 1615: CM spoke with Pt following therapy who states she is still planning on going home and has no interest in Rehab or SNF placement at this time. Pt does desire a 3:1 chair and CM will contact [facility] to make arrangements. Care Management Interventions  PCP Verified by CM:  Yes  Transition of Care Consult (CM Consult): [address]: No  Reason Outside [place]: Physician referred to specific agency([facility])  Discharge Durable Medical Equipment: Yes  Physical Therapy Consult: Yes  Occupational Therapy Consult: Yes  Current Support Network: Own Home  Confirm Follow Up Transport: Family  The Plan for Transition of Care is Related to the Following Treatment Goals : HH  The Patient and/or Patient Representative was Provided with a Choice of Provider and Agrees with the Discharge Plan?: Yes  Freedom of Choice List was Provided with Basic Dialogue that Supports the Patient's Individualized Plan of Care/Goals, Treatment Preferences and Shares the Quality Data Associated with the Providers?: Yes  Discharge Location  Discharge Placement: Home with home health

## 2020-08-24 NOTE — PROGRESS NOTES
1900: Bedside and Verbal shift change report given to Fredis King RN (oncoming nurse) by Sammy Russell RN (offgoing nurse). Report included the following information SBAR, Kardex, Intake/Output, MAR, Recent Results, Med Rec Status, Cardiac Rhythm Sinus Tach/NSR and Alarm Parameters . 2000: Shift assessment completed.     0000: Reassessment completed    0400: Reassessment completed

## 2020-08-25 VITALS
TEMPERATURE: 98.2 F | WEIGHT: 275.8 LBS | SYSTOLIC BLOOD PRESSURE: 112 MMHG | OXYGEN SATURATION: 93 % | HEART RATE: 94 BPM | RESPIRATION RATE: 16 BRPM | BODY MASS INDEX: 47.08 KG/M2 | DIASTOLIC BLOOD PRESSURE: 52 MMHG | HEIGHT: 64 IN

## 2020-08-25 LAB
ANION GAP SERPL CALC-SCNC: 6 MMOL/L (ref 3–18)
BNP SERPL-MCNC: 1705 PG/ML (ref 0–900)
BUN SERPL-MCNC: 15 MG/DL (ref 7–18)
BUN/CREAT SERPL: 14 (ref 12–20)
CALCIUM SERPL-MCNC: 8.7 MG/DL (ref 8.5–10.1)
CHLORIDE SERPL-SCNC: 103 MMOL/L (ref 100–111)
CO2 SERPL-SCNC: 30 MMOL/L (ref 21–32)
CREAT SERPL-MCNC: 1.07 MG/DL (ref 0.6–1.3)
ERYTHROCYTE [DISTWIDTH] IN BLOOD BY AUTOMATED COUNT: 15.9 % (ref 11.6–14.5)
GLUCOSE SERPL-MCNC: 120 MG/DL (ref 74–99)
HCT VFR BLD AUTO: 23.2 % (ref 35–45)
HGB BLD-MCNC: 7.2 G/DL (ref 12–16)
MCH RBC QN AUTO: 30 PG (ref 24–34)
MCHC RBC AUTO-ENTMCNC: 31 G/DL (ref 31–37)
MCV RBC AUTO: 96.7 FL (ref 74–97)
PLATELET # BLD AUTO: 307 K/UL (ref 135–420)
PMV BLD AUTO: 9.1 FL (ref 9.2–11.8)
POTASSIUM SERPL-SCNC: 3.5 MMOL/L (ref 3.5–5.5)
RBC # BLD AUTO: 2.4 M/UL (ref 4.2–5.3)
SODIUM SERPL-SCNC: 139 MMOL/L (ref 136–145)
WBC # BLD AUTO: 6.6 K/UL (ref 4.6–13.2)

## 2020-08-25 PROCEDURE — 94640 AIRWAY INHALATION TREATMENT: CPT

## 2020-08-25 PROCEDURE — 80048 BASIC METABOLIC PNL TOTAL CA: CPT

## 2020-08-25 PROCEDURE — 85027 COMPLETE CBC AUTOMATED: CPT

## 2020-08-25 PROCEDURE — 74011250637 HC RX REV CODE- 250/637: Performed by: PHYSICIAN ASSISTANT

## 2020-08-25 PROCEDURE — 74011000250 HC RX REV CODE- 250: Performed by: INTERNAL MEDICINE

## 2020-08-25 PROCEDURE — 74011250636 HC RX REV CODE- 250/636: Performed by: FAMILY MEDICINE

## 2020-08-25 PROCEDURE — 97116 GAIT TRAINING THERAPY: CPT

## 2020-08-25 PROCEDURE — 74011250637 HC RX REV CODE- 250/637: Performed by: FAMILY MEDICINE

## 2020-08-25 PROCEDURE — 74011250636 HC RX REV CODE- 250/636: Performed by: INTERNAL MEDICINE

## 2020-08-25 PROCEDURE — 74011000258 HC RX REV CODE- 258: Performed by: INTERNAL MEDICINE

## 2020-08-25 PROCEDURE — 36415 COLL VENOUS BLD VENIPUNCTURE: CPT

## 2020-08-25 PROCEDURE — 97535 SELF CARE MNGMENT TRAINING: CPT

## 2020-08-25 PROCEDURE — 74011250637 HC RX REV CODE- 250/637: Performed by: INTERNAL MEDICINE

## 2020-08-25 PROCEDURE — P9047 ALBUMIN (HUMAN), 25%, 50ML: HCPCS | Performed by: FAMILY MEDICINE

## 2020-08-25 PROCEDURE — 83880 ASSAY OF NATRIURETIC PEPTIDE: CPT

## 2020-08-25 RX ORDER — OXYCODONE HYDROCHLORIDE 5 MG/1
5 TABLET ORAL
Qty: 28 TAB | Refills: 0 | Status: SHIPPED | OUTPATIENT
Start: 2020-08-25 | End: 2020-09-01

## 2020-08-25 RX ORDER — CYCLOBENZAPRINE HCL 10 MG
10 TABLET ORAL
Qty: 60 TAB | Refills: 0 | Status: SHIPPED | OUTPATIENT
Start: 2020-08-25

## 2020-08-25 RX ADMIN — OXYCODONE 5 MG: 5 TABLET ORAL at 05:55

## 2020-08-25 RX ADMIN — ALBUMIN (HUMAN) 25 G: 0.25 INJECTION, SOLUTION INTRAVENOUS at 11:51

## 2020-08-25 RX ADMIN — PIPERACILLIN AND TAZOBACTAM 3.38 G: 3; .375 INJECTION, POWDER, LYOPHILIZED, FOR SOLUTION INTRAVENOUS at 09:26

## 2020-08-25 RX ADMIN — OXYCODONE 5 MG: 5 TABLET ORAL at 12:12

## 2020-08-25 RX ADMIN — VANCOMYCIN HYDROCHLORIDE 1750 MG: 10 INJECTION, POWDER, LYOPHILIZED, FOR SOLUTION INTRAVENOUS at 14:04

## 2020-08-25 RX ADMIN — PIPERACILLIN AND TAZOBACTAM 3.38 G: 3; .375 INJECTION, POWDER, LYOPHILIZED, FOR SOLUTION INTRAVENOUS at 02:07

## 2020-08-25 RX ADMIN — VANCOMYCIN HYDROCHLORIDE 1750 MG: 10 INJECTION, POWDER, LYOPHILIZED, FOR SOLUTION INTRAVENOUS at 00:06

## 2020-08-25 RX ADMIN — FAMOTIDINE 20 MG: 20 TABLET, FILM COATED ORAL at 09:26

## 2020-08-25 RX ADMIN — POTASSIUM CHLORIDE 10 MEQ: 750 TABLET, EXTENDED RELEASE ORAL at 09:26

## 2020-08-25 RX ADMIN — ACETAMINOPHEN 1000 MG: 500 TABLET ORAL at 03:17

## 2020-08-25 RX ADMIN — BUDESONIDE 500 MCG: 0.5 INHALANT RESPIRATORY (INHALATION) at 07:18

## 2020-08-25 RX ADMIN — FUROSEMIDE 20 MG: 10 INJECTION, SOLUTION INTRAMUSCULAR; INTRAVENOUS at 09:27

## 2020-08-25 RX ADMIN — ALBUMIN (HUMAN) 25 G: 0.25 INJECTION, SOLUTION INTRAVENOUS at 05:46

## 2020-08-25 NOTE — PROGRESS NOTES
Problem: Self Care Deficits Care Plan (Adult)  Goal: *Acute Goals and Plan of Care (Insert Text)  Description: Initial Occupational Therapy Goals (8/20/2020) Within 7 day(s):    1. Patient will perform toilet transfer with SBA in preparation for bowel and bladder management. 2. Patient will perform bowel and bladder management with SBA for increased independence with ADLs. 3. Patient will perform UB dressing with SBA (including back brace) for increased independence with ADLs. 4. Patient will perform LB dressing with CGA & A/E PRN for increased independence with ADLs. 5. Patient will adhere to back/spinal precautions 100% of the time with 1-2 verbal cues for increased independence with ADLs. 6. Patient will utilize energy conservation techniques with 1 verbal cue(s) for increased independence with ADLs. Outcome: Resolved/Met     OCCUPATIONAL THERAPY TREATMENT/DISCHARGE    Patient: Asa Robles (89 y.o. female)  Date: 8/25/2020  Diagnosis: Lumbar spinal stenosis [M48.061]  S/P lumbar fusion [Z98.1]   Lumbar spinal stenosis  Procedure(s) (LRB):  LUMBAR 3-LUMBAR 5 LAMINECTOMY, INSTRUMENTED FUSION WITH CAGES, EXPLORATION OF LUMBAR 5 - SACRAL 1 FUSION, REMOVAL OF LUMBAR 5-SACRAL 1 HARDWARE WITH C-ARM  **SPEC POP** (N/A) 5 Days Post-Op  Precautions: Back, Fall, Spinal(LSO when OOB)  PLOF: Patient was independent at baseline    Chart, occupational therapy assessment, plan of care, and goals were reviewed. ASSESSMENT:  Patient observed getting to EOB on own while talking on phone attempting to reach outside [facility] for pen to take notes. Pt setup w/ table. Pt requesting toileting needs. Pt setup w/ LSO and able to walk to bathroom w/ RW. Pt reports she is getting a BSC to place over toilet as her toilet is very low. Pt also expressed difficulties w/ hygiene at baseline, especially posterior hygiene. Pt instructed on use of toilet tongs w/ simulation. Pt able to stand sinkside for grooming w/ Supervision.  Pt sat in chair & instructed on sock aide and reacher for LE dressing. Pt able to return demo. Pt walking to bedside chair w/ PTA/Donavon presenting and agreeable to working w/ PT. Handoff to PTA/Donavon. PLAN:  Patient will be discharged from occupational therapy at this time. Rationale for discharge:  [x] Goals Achieved  [] [address]  [] Patient not participating in therapy  [] Other:  Discharge Recommendations:  Home Health  Further Equipment Recommendations for Discharge:  bedside commode     SUBJECTIVE:   Patient stated I have a hard time wiping.     OBJECTIVE DATA SUMMARY:   Cognitive/Behavioral Status:  Neurologic State: Alert, Appropriate for age  Orientation Level: Oriented X4  Cognition: Appropriate decision making, Appropriate for age attention/concentration, Appropriate safety awareness, Follows commands, No command following  Safety/Judgement: Decreased awareness of environment, Decreased awareness of need for assistance, Decreased awareness of need for safety, Decreased insight into deficits    Functional Mobility and Transfers for ADLs:   Bed Mobility:  Supine to Sit: Modified independent   Transfers:  Sit to Stand: Supervision; Adaptive equipment  Bed to Chair: Supervision; Adaptive equipment   Toilet Transfer : Supervision; Adaptive equipment   Bathroom Mobility: Supervision/set up    Balance:  Sitting: Intact  Standing: Intact; With support  Standing - Static: Good  Standing - Dynamic : Good    ADL Intervention:  Feeding  Feeding Assistance: Independent  Container Management: Independent  Cutting Food: Independent  Utensil Management: Independent  Food to Mouth: Independent  Drink to Mouth: Independent    Grooming  Position Performed: Standing  Washing Hands: Supervision;Set-up    Upper Body Dressing Assistance  Orthotics(Brace): Set-up  Pullover Shirt: Set-up    Lower Body Dressing Assistance  Underpants: Set-up; Compensatory technique training(reacher)  Socks: Set-up; Compensatory technique training(sock aide)  Position Performed: Seated in chair    Toileting  Bladder Hygiene: Set-up  Bowel Hygiene: Set-up; Compensatory technique training(toilet tongs)  Clothing Management: Supervision  Adaptive Equipment: Walker    Pain:  Pain level pre-treatment: 3/10   Pain level post-treatment: 3/10   Pain Intervention(s): Medication administered by RN (see MAR); Rest, Ice, Repositioning   Response to intervention: Nurse notified, See doc flow sheet    Please refer to the flowsheet for vital signs taken during this treatment. After treatment:   [x]  Patient left in no apparent distress sitting up in chair  []  Patient left in no apparent distress in bed  [x]  Call bell left within reach  [x]  Nursing notified  [x]  Caregiver present/Donavon/PTA  []  Bed alarm activated    COMMUNICATION/EDUCATION:   [x]      Role of Occupational Therapy in the acute care setting  [x]      Home safety education was provided and the patient/caregiver indicated understanding. [x]      Patient/family have participated as able and agree with findings and recommendations. []      Patient is unable to participate in plan of care at this time. Thank you for allowing me to assist in the care of this patient.   Fatimah Juarez, OTR/L, CSRS, CDCS, CFPS  Time Calculation: 44 mins

## 2020-08-25 NOTE — PROGRESS NOTES
Problem: Falls - Risk of  Goal: *Absence of Falls  Description: Document Rebbecca Persons Fall Risk and appropriate interventions in the flowsheet.   Outcome: Progressing Towards Goal  Note: Fall Risk Interventions:  Mobility Interventions: Patient to call before getting OOB         Medication Interventions: Patient to call before getting OOB, Teach patient to arise slowly    Elimination Interventions: Call light in reach

## 2020-08-25 NOTE — PROGRESS NOTES
Head to toe assessment completed at this time. Pt denies chest pain or SOB. Pt denies any numbness or tingling to extremities. Fall risk band in place. Pt educated on side effects of medication and the use of the incentive spirometer. Pt encouraged to manage pain and call for assistance. Pt left in bed with call light within reach, bed in low position and wheels locked. Will continue to monitor. Sedative medications held due to drowsiness and PRN medication. Upon rounding during set of vitals, pt stated pain 7/10, when asked why no call before pain reached a 7 pt stated \" I was trying to hold out\". Pt re-educated on pain control. Pt's HGB still trending downward, currently 7.2, will continue to monitor. Pt stated they want to wait to ambulate to the chair. Shift Summ- Pt had an uneventful night, pt seemed lethargic off and on. Pt's HR decreased to normal levels. Pt states pain has gone down, but also stated the level is the same as before. Pt left in room resting comfortably, no signs of distress.

## 2020-08-25 NOTE — PROGRESS NOTES
Hospitalist Progress Note    Patient: Angela Lugo MRN: [id number]  CSN: [id number]    YOB: 1960  Age: 62 y.o. Sex: female    DOA: 8/20/2020 LOS:  LOS: 4 days                Assessment/Plan     Patient Active Problem List   Diagnosis Code    HTN (hypertension), benign I10    Depression F32.9    Asthma J45.909    Bipolar 1 disorder, depressed ([facility] F31.9    PTSD (post-traumatic stress disorder) F43.10    Back pain M54.9    Hx of CABG Z95.1    3-vessel CAD I25.10    Essential hypertension I10    Hyperlipidemia E78.5    Morbid obesity ([facility] E66.01    Lumbar spinal stenosis M48.061    Lumbar spondylosis M47.816    Radiculopathy of leg M54.10    Sepsis secondary to UTI ([facility] A41.9, N39.0    ROSANGELA (acute kidney injury) ([facility] N17.9    UTI (urinary tract infection) N39.0    S/P lumbar fusion Z98.1    Shock ([address] R57.9    COPD (chronic obstructive pulmonary disease) ([facility] J44.9    ALLEN (obstructive sleep apnea) G47.33    Hypoxemia requiring supplemental oxygen R09.02, Z99.81    Acute diastolic CHF (congestive heart failure) ([facility] I50.31    Cigar smoker F17.290    Snoring R06.83            63 y/o female with postop fever and hypotension. Now feeling better, blood pressure stable. Sitting on the chair, eating lunch denies any complaints    Hypertension-  Off phenylephrine, blood pressure stable. Seen by cardiology. CTA chest/chest x-ray with pulmonary vascular congestion, no consolidation. Continue with Lasix. Antibiotics vancomycin, Zosyn, Levaquin. Blood and urine cultures no growth to date. WBC in normal range, she is afebrile. Will stop antibiotics at discharge. CAD with history of CABG-  Beta-blocker on hold. Aspirin and Plavix on hold due to recent surgery. Restart aspirin and Plavix once okay from Ortho standpoint. Anemia-  Acute blood loss, transfuse if hemoglobin less than 7. Follow-up with PCP for hemoglobin check.     Echo with Ef of 55%, grade 1 ALEA.    She will need sleep study as outpatient. Okay to discharge from medical standpoint, follow-up with PCP. Review of systems  General: No fevers or chills. Cardiovascular: No chest pain or pressure. No palpitations. Pulmonary: No shortness of breath. Gastrointestinal: No nausea, vomiting. Physical Exam:  General: Awake, cooperative, no acute distress    HEENT: NC, Atraumatic. PERRLA, anicteric sclerae. Lungs: CTA Bilaterally. No Wheezing/Rhonchi/Rales. Heart:  S1 S2,  No murmur, No Rubs, No Gallops  Abdomen: Soft, Non distended, Non tender.  +Bowel sounds,   Extremities: No c/c/e  Psych:   Not anxious or agitated. Neurologic:  No acute neurological deficit. Vital signs/Intake and Output:  Visit Vitals  /52 (BP 1 Location: Left arm, BP Patient Position: Sitting)   Pulse 94   Temp 98.2 °F (36.8 °C)   Resp 16   Ht 5' 4\" (1.626 m)   Wt 125.1 kg (275 lb 12.7 oz)   SpO2 93%   BMI 47.34 kg/m²     Current Shift:  No intake/output data recorded.   Last three shifts:  08/23 1901 - 08/25 0700  In: 1661 [P.O.:50; I.V.:1006.2]  Out: 350 [Urine:350]            Labs: Results:       Chemistry Recent Labs     08/25/20 0425 08/24/20 0355 08/23/20  1039 08/23/20  0420   * 102*  --  97    139  --  138   K 3.5 3.7  --  4.0    104  --  104   CO2 30 33*  --  30   BUN 15 11  --  10   CREA 1.07 0.99  --  0.96   CA 8.7 8.7  --  8.3*   AGAP 6 2*  --  4   BUCR 14 11*  --  10*   AP  --   --  92  --    TP  --   --  5.8*  --    ALB  --   --  3.0*  --    GLOB  --   --  2.8  --    AGRAT  --   --  1.1  --       CBC w/Diff Recent Labs     08/25/20 0425 08/24/20  0355 08/23/20  1039 08/23/20  0420 08/22/20  1835   WBC 6.6 7.4 9.2 9.8 9.7   RBC 2.40* 2.52* 2.65* 2.72* 3.04*   HGB 7.2* 7.6* 8.1* 8.2* 9.2*   HCT 23.2* 24.5* 25.1* 25.8* 28.8*    255 195 188 205   GRANS  --   --   --  75* 75*   LYMPH  --   --   --  13* 12*   EOS  --   --   --  3 4      Cardiac Enzymes Recent Labs 08/23/20  1039 08/22/20  1835    182   CKND1 CALCULATION NOT PERFORMED WHEN RESULT IS BELOW LINEAR LIMIT CALCULATION NOT PERFORMED WHEN RESULT IS BELOW LINEAR LIMIT      Coagulation Recent Labs     08/23/20  1039   PTP 15.3*   INR 1.2   APTT 36.4       Lipid Panel Lab Results   Component Value Date/Time    Cholesterol, total 195 03/15/2018 08:15 AM    HDL Cholesterol 42 03/15/2018 08:15 AM    LDL, calculated 128.6 (H) 03/15/2018 08:15 AM    VLDL, calculated 24.4 03/15/2018 08:15 AM    Triglyceride 122 03/15/2018 08:15 AM    CHOL/HDL Ratio 4.6 03/15/2018 08:15 AM      BNP No results for input(s): BNPP in the last 72 hours.    Liver Enzymes Recent Labs     08/23/20  1039   TP 5.8*   ALB 3.0*   AP 92      Thyroid Studies Lab Results   Component Value Date/Time    TSH 0.74 08/23/2020 10:39 AM        Procedures/imaging: see electronic medical records for all procedures/Xrays and details which were not copied into this note but were reviewed prior to creation of Plan

## 2020-08-25 NOTE — PROGRESS NOTES
Problem: Falls - Risk of  Goal: *Absence of Falls  Description: Document Pj Camarena Fall Risk and appropriate interventions in the flowsheet. 8/25/2020 0805 by Binh Cannon RN  Outcome: Progressing Towards Goal  Note: Fall Risk Interventions:  Mobility Interventions: Patient to call before getting OOB, Utilize walker, cane, or other assistive device, PT Consult for mobility concerns         Medication Interventions: Patient to call before getting OOB, Teach patient to arise slowly    Elimination Interventions: Call light in reach, Patient to call for help with toileting needs           8/24/2020 1835 by Binh Cannon RN  Outcome: Progressing Towards Goal  Note: Fall Risk Interventions:  Mobility Interventions: Patient to call before getting OOB         Medication Interventions: Patient to call before getting OOB    Elimination Interventions: Call light in reach, Patient to call for help with toileting needs              Problem: Patient Education: Go to Patient Education Activity  Goal: Patient/Family Education  8/25/2020 0805 by Binh Cannon RN  Outcome: Progressing Towards Goal  8/24/2020 [address] by Binh Cannon RN  Outcome: Progressing Towards Goal     Problem: Patient Education: Go to Patient Education Activity  Goal: Patient/Family Education  8/25/2020 0805 by Binh Cannon RN  Outcome: Progressing Towards Goal  8/24/2020 1835 by Binh Cannon RN  Outcome: Progressing Towards Goal     Problem: Patient Education: Go to Patient Education Activity  Goal: Patient/Family Education  8/25/2020 0805 by Binh Cannon RN  Outcome: Progressing Towards Goal  8/24/2020 1835 by Binh Cannon RN  Outcome: Progressing Towards Goal     Problem: Pressure Injury - Risk of  Goal: *Prevention of pressure injury  Description: Document Kannan Scale and appropriate interventions in the flowsheet.   8/25/2020 0805 by Binh Cannon RN  Outcome: Progressing Towards Goal  Note: Pressure Injury Interventions: Moisture Interventions: Absorbent underpads    Activity Interventions: Increase time out of bed, Pressure redistribution bed/mattress(bed type), PT/OT evaluation    Mobility Interventions: Pressure redistribution bed/mattress (bed type), PT/OT evaluation    Nutrition Interventions: Document food/fluid/supplement intake                  8/24/2020 1835 by Kwame Paredes RN  Outcome: Progressing Towards Goal  Note: Pressure Injury Interventions:       Moisture Interventions: Absorbent underpads    Activity Interventions: Increase time out of bed, Pressure redistribution bed/mattress(bed type), PT/OT evaluation    Mobility Interventions: Pressure redistribution bed/mattress (bed type), PT/OT evaluation    Nutrition Interventions: Document food/fluid/supplement intake                     Problem: Patient Education: Go to Patient Education Activity  Goal: Patient/Family Education  8/25/2020 0805 by Kwame Paredes RN  Outcome: Progressing Towards Goal  8/24/2020 1835 by Kwame Paredes RN  Outcome: Progressing Towards Goal     Problem: Pain  Goal: *Control of Pain  8/25/2020 0805 by Kwame Paredes RN  Outcome: Progressing Towards Goal  8/24/2020 1835 by Kwame Paredes RN  Outcome: Progressing Towards Goal     Problem: Patient Education: Go to Patient Education Activity  Goal: Patient/Family Education  8/25/2020 0805 by Kwame Paredes RN  Outcome: Progressing Towards Goal  8/24/2020 1835 by Kwame Paredes RN  Outcome: Progressing Towards Goal

## 2020-08-25 NOTE — PROGRESS NOTES
Discharge instructions reviewed with the patient. Patient verbalized understanding and verified by teach back. All questions answered. IV discontinued, no redness, swelling or pain noted. Patients famiy in for transportation home. Patient discharged off the unit via wheelchair.  Patient armband removed and shredded

## 2020-08-25 NOTE — PROGRESS NOTES
Myah Paredes post spine rounding. Patient educated: Activity:  OOB for all meals, walk every hour to prevent blood clots  Follow back precautions (no bending, twisting, lifting &  Log roll in/out of bed). If your surgeon wants you to wear a back brace. Wear it per your surgeon's instructions. Promoting Circulation:   Use SCD pumps except when walking. Ankle pumps 10 times an hour at hospital & home. Pain Control:  Pain medications side effects discussed. Use ice, distraction, moving, & change position to also help with pain. Narcotics and anesthesia cause constipation so it is important to take stool softener/mild laxative daily while on narcotics. Incentive Spirometry:    Use of incentive spirometer 10 x/hr. Wound Care:   Educated on how to manage their dressing and/or incision once at home per their MD protocol. Keep dressing and or/incision clean and dry. No lotions, powders, creams to surgical site. Diet:   Eat for healing. Drink 8 glasses of water a day. Pain medication causes nausea and dizziness if taken on an empty stomach so instructed to make sure to eat a snack before taking pain medication   Patient Safety:   Call light & belongings in reach. Call for help when want to walk or get OOB. Myah Paredes verbalized understand. Given the opportunity for asking questions.

## 2020-08-25 NOTE — ROUTINE PROCESS
Bedside and Verbal shift change report given to Kristin Cortes (oncoming nurse) by Devaughn Miles RN (offgoing nurse). Report included the following information SBAR, Kardex, OR Summary, Intake/Output, MAR and Recent Results.

## 2020-08-25 NOTE — DISCHARGE INSTRUCTIONS
[facility]  Dr. Tamara Jaquez Post-Operative Instructions Lumbar Fusion    *YOU MUST AVOID SMOKING OR BEING AROUND ANYONE WHO SMOKES. AVOID ALL PRODUCTS THAT CONTAIN NICOTINE. DO NOT TAKE IBUPROFEN OR ANTI--INFLAMMATORIES, AS THESE MAY ALTER THE HEALING OF THE FUSION. ACTIVITIES :  *The first week after surgery   1. You may be up and walking about the house. 2.  Activities around the house, such as washing dishes, fixing light meals, and your own personal care are fine. 3.  Avoid strenuous activities, such as vacuuming, lifting laundry or grocery bags. 4.  Do not lift anything heavier than 1 gallon of milk (or about 5-8 pounds). 5.  Do not bend over to  items from the ground level until 3 months post-op. *Week 2 and beyond  1. You may gradually increase your activities, but still avoid heavy lifting, pushing/pulling. 2.  Walking is the best way to rebuild strength and stamina. Start SLOWLY and gradually increase the distance a little every week. 3.  Walk at a pace that avoids fatigue or severe pain. Do not try to walk several blocks the first day! As you increase the distance, you may feel tired. If so, stop and rest.   4.  You should be able to walk several blocks by your first clinic visit. 5.  Follow-up with Dr. Tiffany Fernandez in 10-14 days. BATHING and INCISION CARE:  1. The incision may be tender to touch or feel numb: this is normal.   2.  Keep the incision clean and dry no showering until your follow-up appointment. The incision will be closed with sutures under the skin and the skin will be glued. 3.  Do not apply any lotions, ointments or oils on the incision. 4.  If you notice any excessive swelling, redness, or persistent drainage around the incision, notify the office immediately. DRIVIN. You should not drive until after your follow-up appointment.    2.  You can be in a vehicle for short distances, but if you travel any long distance, please stop about every 30 minutes and walk/stretch. 3.  You should NEVER drive while taking narcotic medication. RETURN TO WORK :  1. The decision to return to work will be determined on an individual basis. 2.  Many people who have a strenuous job (construction, heavy labor, etc) may need to be off work for up to 12 weeks. 3.  If you need a work note, please let us know as soon as possible, and not the same day you are planning to return to work. NUTRITION :  1.  Good nutrition is an essential part of healing. 2.  You should eat a balanced diet each day, including fruits, vegetables, dairy products and protein. 3.  Remember to drink plenty of water. 4.  If you have not had a bowel movement within 3 days of surgery, you will need to use a laxative or suppository that can be obtained over-the-counter at your local pharmacy. BONE STIMULATOR:  1. A spinal bone stimulator may be prescribed for you under certain situations. 2.  A nurse will call you if one has been requested and discuss its use for approximately 4-6 months post-op every day. 3.  This device assists in bone healing and fusion. MEDICATIONS -  1. You may resume the medications you were taking before surgery, with the general exception of (or DO NOT TAKE) non-steroidal anti-inflammatory medications, such as Motrin, Aleve, Advil Naprosyn, Ibuprofen or aspirin. 2.  You will receive a prescription for pain medication at discharge from the hospital. The pain medication works best if taken before the pain becomes severe. 3.  To reduce stomach upset, always take the medication with food. 4.  Begin to wean yourself off the pain medication during the second week after discharge. 5.  If you need a refill, please call the office during working hours at least 2 days before your prescription runs out. Do not wait until your bottle is empty to call for a refill. 6.  DO NOT drive if you are taking narcotic pain medications.     HOME HEALTH CARE:  1.   A home health care service has been set-up for you to help assist you once you leave the hospital.  2.  They will contact you either before you leave the hospital or within 24 hours once you have been discharged home. 3. A nurse will assist you with your dressing changes and a Physical Therapist with help you with your therapy needs. CALL THE OFFICE:   If you have severe pain unrelieved by the medications, new numbness or tingling in your legs;   If you have a fever of 101.0°F or greater;    If you notice excessive swelling, redness, or persistent drainage from the incision or IV site; [facility] office number is [phone] from 8:00am to 5:00pm Monday through Friday. After 5:00pm, on weekends, or holidays, please leave a message with our answering service and the doctor on-call will get back to you shortly. DISCHARGE SUMMARY from Nurse    PATIENT INSTRUCTIONS:    After general anesthesia or intravenous sedation, for 24 hours or while taking prescription Narcotics:  · Limit your activities  · Do not drive and operate hazardous machinery  · Do not make important personal or business decisions  · Do  not drink alcoholic beverages  · If you have not urinated within 8 hours after discharge, please contact your surgeon on call. Report the following to your surgeon:  · Excessive pain, swelling, redness or odor of or around the surgical area  · Temperature over 100.5  · Nausea and vomiting lasting longer than 4 hours or if unable to take medications  · Any signs of decreased circulation or nerve impairment to extremity: change in color, persistent  numbness, tingling, coldness or increase pain  · Any questions    What to do at Home:  Recommended activity:     *  Please give a list of your current medications to your Primary Care Provider.     *  Please update this list whenever your medications are discontinued, doses are      changed, or new medications (including over-the-counter products) are added.    *  Please carry medication information at all times in case of emergency situations. These are general instructions for a healthy lifestyle:    No smoking/ No tobacco products/ Avoid exposure to second hand smoke  Surgeon General's Warning:  Quitting smoking now greatly reduces serious risk to your health. Obesity, smoking, and sedentary lifestyle greatly increases your risk for illness    A healthy diet, regular physical exercise & weight monitoring are important for maintaining a healthy lifestyle    You may be retaining fluid if you have a history of heart failure or if you experience any of the following symptoms:  Weight gain of 3 pounds or more overnight or 5 pounds in a week, increased swelling in our hands or feet or shortness of breath while lying flat in bed. Please call your doctor as soon as you notice any of these symptoms; do not wait until your next office visit. The discharge information has been reviewed with the patient. The patient verbalized understanding. Discharge medications reviewed with the patient and appropriate educational materials and side effects teaching were provided.   ___________________________________________________________________________________________________________________________________

## 2020-08-25 NOTE — DISCHARGE SUMMARY
Discharge Summary    Patient: Keith Hodgkins               Sex: female          DOA: 8/20/2020         YOB: 1960      Age:  62 y.o.        LOS:  LOS: 4 days                Admit Date: 8/20/2020    Discharge Date: 8/25/2020    Admission Diagnoses: Lumbar spinal stenosis [M48.061]  S/P lumbar fusion [Z98.1]    Discharge Diagnoses:    Problem List as of 8/25/2020 Date Reviewed: 8/20/2020          Codes Class Noted - Resolved    Snoring ICD-10-CM: R06.83  ICD-9-CM: 786.09  8/24/2020 - Present        Shock ([facility] ICD-10-CM: R57.9  ICD-9-CM: 785.50  8/23/2020 - Present        COPD (chronic obstructive pulmonary disease) ([facility] ICD-10-CM: J44.9  ICD-9-CM: 165  8/23/2020 - Present        ALLEN (obstructive sleep apnea) ICD-10-CM: G47.33  ICD-9-CM: 327.23  8/23/2020 - Present        Hypoxemia requiring supplemental oxygen ICD-10-CM: R09.02, Z99.81  ICD-9-CM: 799.02  8/23/2020 - Present        Acute diastolic CHF (congestive heart failure) ([facility] ICD-10-CM: I50.31  ICD-9-CM: 428.31, 428.0  8/23/2020 - Present        Cigar smoker ICD-10-CM: F17.290  ICD-9-CM: 305.1  8/23/2020 - Present        Sepsis secondary to UTI [facility]) ICD-10-CM: A41.9, N39.0  ICD-9-CM: 038.9, 995.91, 599.0  8/21/2020 - Present        ROSANGELA (acute kidney injury) ([facility] ICD-10-CM: N17.9  ICD-9-CM: 584.9  8/21/2020 - Present        UTI (urinary tract infection) ICD-10-CM: N39.0  ICD-9-CM: 599.0  8/21/2020 - Present        S/P lumbar fusion ICD-10-CM: Z98.1  ICD-9-CM: V45.4  8/21/2020 - Present        * (Principal) Lumbar spinal stenosis ICD-10-CM: M48.061  ICD-9-CM: 724.02  8/19/2020 - Present        Lumbar spondylosis ICD-10-CM: M47.816  ICD-9-CM: 721.3  8/19/2020 - Present        Radiculopathy of leg ICD-10-CM: M54.10  ICD-9-CM: 724.4  8/19/2020 - Present        Essential hypertension ICD-10-CM: I10  ICD-9-CM: 401.9  3/16/2018 - Present        Hx of CABG ICD-10-CM: Z95.1  ICD-9-CM: V45.81  3/15/2018 - Present        3-vessel CAD ICD-10-CM: I25.10  ICD-9-CM: 414.00  3/15/2018 - Present        Hyperlipidemia ICD-10-CM: E78.5  ICD-9-CM: 272.4  3/15/2018 - Present        Morbid obesity ([facility] ICD-10-CM: E66.01  ICD-9-CM: 278.01  3/15/2018 - Present        Depression ICD-10-CM: F32.9  ICD-9-CM: 311  Unknown - Present        Asthma ICD-10-CM: J45.909  ICD-9-CM: 493.90  Unknown - Present        Bipolar 1 disorder, depressed ([facility] ICD-10-CM: F31.9  ICD-9-CM: 296.50  Unknown - Present        PTSD (post-traumatic stress disorder) ICD-10-CM: F43.10  ICD-9-CM: 309.81  Unknown - Present        Back pain ICD-10-CM: M54.9  ICD-9-CM: 724.5  10/25/2012 - Present        HTN (hypertension), benign ICD-10-CM: I10  ICD-9-CM: 401.1  Unknown - Present              Discharge Condition:  stable    Hospital Course: The patient was transferred to the floor for post-operative and medical care. She   was alert and oriented times 3. She was neurologically intact in the lower extremities, and calves are non-tender. She was c/o of stable, mild-to-moderate joint symptoms intermittently, reasonably well controlled by current meds . She will begin Physical therapy and will be up and ambulatory with their walker. Their Hemoglobin was stable. She was transferred to the ICU for possible sepsis however her cultures were negative and she was treated empirically and followed by Medicine. Their pain medications were continued for pain control. She continued to progress with physical therapy over her hospital stay. Later today, if She is thought to be medically and orthopaedically stable then they will be discharged. Consults: Hospitalist    Significant Diagnostic Studies: none    Discharge Medications:     Current Discharge Medication List      START taking these medications    Details   cyclobenzaprine (FLEXERIL) 10 mg tablet Take 1 Tab by mouth three (3) times daily as needed for Muscle Spasm(s).   Qty: 60 Tab, Refills: 0      oxyCODONE IR (ROXICODONE) 5 mg immediate release tablet Take 1 Tab by mouth every six (6) hours as needed for Pain for up to 7 days. Max Daily Amount: 20 mg.  Qty: 28 Tab, Refills: 0    Associated Diagnoses: Spinal stenosis of lumbar region with neurogenic claudication         CONTINUE these medications which have NOT CHANGED    Details   albuterol (PROVENTIL HFA, VENTOLIN HFA, PROAIR HFA) 90 mcg/actuation inhaler Take  by inhalation every four (4) hours as needed for Wheezing. umeclidinium brm/vilanterol tr (ANORO ELLIPTA IN) Take  by inhalation daily. ARIPiprazole (Abilify) 2 mg tablet Take 2 mg by mouth nightly. chlorthalidone (HYGROTEN) 25 mg tablet Take 25 mg by mouth daily. chlorzoxazone (Parafon Forte DSC) 500 mg tablet Take 500 mg by mouth nightly as needed for Muscle Spasm(s). clobetasoL (TEMOVATE) 0.05 % ointment Apply  to affected area two (2) times daily as needed for Skin Irritation. Omeprazole delayed release (PRILOSEC D/R) 20 mg tablet Take 20 mg by mouth two (2) times a day. potassium chloride SA (MICRO-K) 10 mEq capsule Take 10 mEq by mouth daily. pregabalin (Lyrica) 300 mg capsule Take 300 mg by mouth two (2) times a day. Indications: disorder characterized by stiff, tender & painful muscles, repeated episodes of anxiety      OTHER Indications: Nicotene patch daily      atorvastatin (LIPITOR) 20 mg tablet Take 20 mg by mouth nightly. albuterol (PROVENTIL VENTOLIN) 2.5 mg /3 mL (0.083 %) nebulizer solution by Nebulization route every four (4) hours as needed for Wheezing. fluticasone (FLONASE) 50 mcg/actuation nasal spray 2 Sprays by Both Nostrils route two (2) times a day. furosemide (LASIX) 40 mg tablet Take 40 mg by mouth every other day. Indications: visible water retention      metoprolol tartrate (LOPRESSOR) 50 mg tablet Take 50 mg by mouth two (2) times a day. losartan (COZAAR) 100 mg tablet Take 100 mg by mouth daily.       lamoTRIgine (LAMICTAL) 100 mg tablet Take 150 mg by mouth two (2) times a day. Indications: DEPRESSION ASSOCIATED WITH BIPOLAR DISORDER      clonazePAM (KLONOPIN) 0.5 mg tablet Take 1 mg by mouth three (3) times daily as needed. Indications: usually takes once         STOP taking these medications       clopidogreL (Plavix) 75 mg tab Comments:   Reason for Stopping:         aspirin 81 mg chewable tablet Comments:   Reason for Stopping:         gabapentin (NEURONTIN) 800 mg tablet Comments:   Reason for Stopping:         acetaminophen (TylenoL) 325 mg tablet Comments:   Reason for Stopping:               Activity: No lifting, driving or strenuous activity for 2 weeks     Diet:  Regular Diet    Wound Care: Keep wound clean and dry. Change dressing as needed. Follow-up: Pt should follow-up in the office in 10 days - 2 weeks. They should not get their incision wet until they see us back. The patient will have home health care since they are home bound after their recent surgery. They will have dressing changes and physical therapy. With physical therapy, they should be up and ambulatory weight bear as tolerated. They will be sent home on Percocet and Flexeril. They will also take all of the medications on their discharge medical reconciliation form. They should avoid any lifting, anti-inflammatories or tobacco use. They will call with any and all concerns. Their medications are on the chart.

## 2020-08-25 NOTE — PROGRESS NOTES
Progress Note      Patient: Terri Moran               Sex: female          DOA: 8/20/2020         YOB: 1960      Age:  62 y.o.        LOS:  LOS: 4 days     Procedure: Procedure(s):  LUMBAR 3-LUMBAR 5 LAMINECTOMY, INSTRUMENTED FUSION WITH CAGES, EXPLORATION OF LUMBAR 5 - SACRAL 1 FUSION, REMOVAL OF LUMBAR 5-SACRAL 1 HARDWARE WITH C-ARM  **SPEC POP**            Subjective:     Terri Moran is a 62 y.o. female who c/o stable, mild-to-moderate joint symptoms intermittently, reasonably well controlled by PRN meds      Objective:      Visit Vitals  /62   Pulse 98   Temp 98.1 °F (36.7 °C)   Resp 16   Ht 5' 4\" (1.626 m)   Wt 125.1 kg (275 lb 12.7 oz)   SpO2 94%   BMI 47.34 kg/m²       Physical Exam:  A&O x3  VSS  HF/GS/QUADS/EHL/TA 5/5 bilateral  Calves nontender    Dressing: C/D/I    Intake and Output:  Current Shift:  No intake/output data recorded.   Last three shifts:  08/23 1901 - 08/25 0700  In: 1661 [P.O.:50; I.V.:1006.2]  Out: 350 [Urine:350]    Drain Output:    Lab/Data Reviewed:  BMP:   Lab Results   Component Value Date/Time     08/25/2020 04:25 AM    K 3.5 08/25/2020 04:25 AM     08/25/2020 04:25 AM    CO2 30 08/25/2020 04:25 AM    AGAP 6 08/25/2020 04:25 AM     (H) 08/25/2020 04:25 AM    BUN 15 08/25/2020 04:25 AM    CREA 1.07 08/25/2020 04:25 AM    GFRAA >60 08/25/2020 04:25 AM    GFRNA 52 (L) 08/25/2020 04:25 AM     CBC:   Lab Results   Component Value Date/Time    WBC 6.6 08/25/2020 04:25 AM    HGB 7.2 (L) 08/25/2020 04:25 AM    HCT 23.2 (L) 08/25/2020 04:25 AM     08/25/2020 04:25 AM       Medications Reviewed    Continued hospitalization is indicated due to s/p cervical fusion      Assessment/Plan     Principal Problem:    Lumbar spinal stenosis (8/19/2020)    Active Problems:    HTN (hypertension), benign ()      Hx of CABG (3/15/2018)      Hyperlipidemia (3/15/2018)      Lumbar spondylosis (8/19/2020)      Radiculopathy of leg (8/19/2020)      Sepsis secondary to UTI ([facility] (8/21/2020)      ROSANGELA (acute kidney injury) ([address] (8/21/2020)      UTI (urinary tract infection) (8/21/2020)      S/P lumbar fusion (8/21/2020)      Shock ([address] (8/23/2020)      COPD (chronic obstructive pulmonary disease) ([facility] (8/23/2020)      ALLEN (obstructive sleep apnea) (8/23/2020)      Hypoxemia requiring supplemental oxygen (8/23/2020)      Acute diastolic CHF (congestive heart failure) ([facility] (8/23/2020)      Cigar smoker (8/23/2020)      Snoring (8/24/2020)      Post-op Blood loss anemia    S/p Lumbar fusion doing well  Change Dressing  Increase activity with PT today, OOB to chair  D/c home when cleared by Medicine  Home health for discharge         Home

## 2020-08-25 NOTE — PROGRESS NOTES
0730 - Bedside shift report received from [facility]. Assumed care of patient. Patient noted resting in bed at this time. Call light in reach. 0195 - Assessment complete. Patient alert and oriented x 4. Cap refills < 3 sec. Pulses palpable and equal bilaterally. Lung sounds clear and diminished. Respirations even and unlabored. Abd soft and nontender. Bowel sounds active to all 4 quads. Voiding without difficulty. Skin warm and dry. Drsg to lower back noted intact with scant amount of shadowing. Back brace at bedside. IV to left FA, site CDI. SCD's to BLE. Reports pain 5/10 and tolerable at this time. Patient eating with call light in reach. 1151 - Patient assisted back into bed from chair. Call light in reach. 1212 - PRN oxycodone administered for 6/10 pain to lower back. Call light in reach. 1304 - Patient ambulated to restroom, voided without difficulty, and assisted to chair for lunch. Call light in reach. 1500 - Dr. Robyn Esteban stated patient is medically cleared for discharge.

## 2020-08-26 ENCOUNTER — PATIENT OUTREACH (OUTPATIENT)
Dept: CASE MANAGEMENT | Age: 60
End: 2020-08-26

## 2020-08-26 NOTE — PROGRESS NOTES
Patient contacted regarding recent discharge and COVID-19 risk. COVID-19 related testing which was available at this time. Test results were negative. Patient informed of results, if available? yes    No answer; unable to leave message. Voicemail full. Left HIPPA compliant message with Donnell Manzanaresandra, mother. Requested return call. Contact information provided. Will attempt to contact at a later time.

## 2020-08-26 NOTE — PROGRESS NOTES
Problem: Mobility Impaired (Adult and Pediatric)  Goal: *Acute Goals and Plan of Care (Insert Text)  Description: In 1-4 days pt will be able to perform:  STG  1. Bed mobility:  Demonstrate proper log-roll technique for safe initiation of rolling for OOB activities. 2.  Supine to sit to supine S with HR for meals. 3.  Sit to stand to sit S with RW/LSO in prep for ambulation. LT.  Gait:  Ambulate 150ft S with RW/LSO, for home/community mobility. 2.  Activity tolerance: Tolerate up in chair 30 minutes-1 hour for ADL's.  3.  Patient/Family Education:  Patient/family to be independent with HEP for follow-up care and safe discharge. Outcome: Resolved/Met    PHYSICAL THERAPY TREATMENT    Patient: Charlene Ibanez (70 y.o. female)  Date: 2020  Diagnosis: Lumbar spinal stenosis [M48.061]  S/P lumbar fusion [Z98.1]   Lumbar spinal stenosis  Procedure(s) (LRB):  LUMBAR 3-LUMBAR 5 LAMINECTOMY, INSTRUMENTED FUSION WITH CAGES, EXPLORATION OF LUMBAR 5 - SACRAL 1 FUSION, REMOVAL OF LUMBAR 5-SACRAL 1 HARDWARE WITH C-ARM  **SPEC POP** (N/A) 5 Days Post-Op  Precautions: Back, Fall, Spinal(LSO when OOB)   Chart, physical therapy assessment, plan of care and goals were reviewed. ASSESSMENT:  Pt meets needs for safe home mobility, expresses understanding of HEP and is cleared from this level of PT. Pt will use recliner for sleeping, until better capable of performing bed mobility  Progression toward goals:  []      Improving appropriately and progressing toward goals  [x]      Improving slowly and progressing toward goals  []      Not making progress toward goals and plan of care will be adjusted     PLAN:  Patient continues to benefit from skilled intervention to address the above impairments. Continue treatment per established plan of care.   Discharge Recommendations:  Home Health  Further Equipment Recommendations for Discharge:  RW, Brace and recliner     SUBJECTIVE:   Patient stated Since I'm up already.     OBJECTIVE DATA SUMMARY:   Critical Behavior:  Neurologic State: Alert, Appropriate for age  Orientation Level: Oriented X4  Cognition: Appropriate decision making, Appropriate for age attention/concentration, Appropriate safety awareness, Follows commands, No command following  Safety/Judgement: Decreased awareness of environment, Decreased awareness of need for assistance, Decreased awareness of need for safety, Decreased insight into deficits  Functional Mobility Training:  Bed Mobility:  Rolling: Contact guard assistance, Minimum assistance  Supine to Sit: Modified independent  Sit to Supine: Supervision, Minimum assistance  Scooting: Minimum assistance  Transfers:  Sit to Stand: Supervision, Adaptive equipment  Stand to Sit: Supervision, Adaptive equipment  Bed to Chair: Supervision, Adaptive equipment  Balance:  Sitting: Intact  Standing: Intact, With support  Standing - Static: Good  Standing - Dynamic : Good  Ambulation/Gait Training:  Distance (ft): 200 Feet (ft)  Assistive Device: Brace/Splint, Gait belt, Walker, rolling  Ambulation - Level of Assistance: Supervision  Gait Abnormalities: Antalgic, Decreased step clearance  Base of Support: Widened  Speed/Lucy: Slow  Step Length: Right shortened, Left shortened  Pain:  Pain Scale 1: Numeric (0 - 10)  Pain Intensity 1: 5  Pain Location 1: Back  Pain Orientation 1: Lower  Pain Description 1: Aching  Pain Intervention(s) 1: Rest  Activity Tolerance:   Good  Please refer to the flowsheet for vital signs taken during this treatment.   After treatment:   [x] Patient left in no apparent distress sitting up in chair  [] Patient left in no apparent distress in bed  [x] Call bell left within reach  [x] Nursing notified  [] Caregiver present  [] Bed alarm activated      Karissa Cisneros PTA   Time Calculation: 27 mins

## 2020-08-26 NOTE — PROGRESS NOTES
Physician Progress Note      PATIENT:               Lionel Alejandro  CSN #:                  [id number]  :                       1960  ADMIT DATE:       2020 6:54 AM  DISCH DATE:        2020 4:04 PM  RESPONDING  PROVIDER #:        Leatha Diaz MD          QUERY TEXT:    Dear Dr. Claudell Jain,  Pt admitted with Lumbar spinal stenosis. Pt noted to have Sepsis-likely due to urinary source  per Dr. Judge Springer. Dr. Steven Leyva documented Postoperative hypotension- resolved. Doubt sepsis as source. Urine culture with no growth . Dr. Sienna Guzman documented Shock-likely combination of sepsis and cardiac. If possible, please document in the progress notes and discharge summary if you are evaluating and /or treating any of the following: The medical record reflects the following:    Risk Factors: Morbid obesity, S/Pop, lumbar stenosis    Clinical Indicators: lactic acid 2.9, WBC 11.3, 10.5, 9.7, 7.4, temp 99.1, 100.5, 101.1, 100.6,  , bp 81/55, 96/94    Treatment: receiving sepsis bundle initiated, zosyn and vancomycin, repeat lactic acid within 4 hours    Thank you,  Kulwant Acosta RN, BSN, [facility]  [phone]  Options provided:  -- Sepsis, present on admission  -- Sepsis, now resolved  -- No Sepsis, localized infection only UTI  -- Sepsis was ruled out  -- Other - I will add my own diagnosis  -- Disagree - Not applicable / Not valid  -- Disagree - Clinically unable to determine / Unknown  -- Refer to Clinical Documentation Reviewer    PROVIDER RESPONSE TEXT:    After further study, sepsis was ruled out for this patient.     Query created by: Caren Palacio on 2020 3:05 PM      Electronically signed by:  Leatha Diaz MD 2020 4:57 PM

## 2020-08-27 LAB
BACTERIA SPEC CULT: NORMAL
SERVICE CMNT-IMP: NORMAL

## 2020-08-28 ENCOUNTER — PATIENT OUTREACH (OUTPATIENT)
Dept: CASE MANAGEMENT | Age: 60
End: 2020-08-28

## 2021-08-03 PROBLEM — I10 HTN (HYPERTENSION), BENIGN: Status: RESOLVED | Noted: 2021-08-03 | Resolved: 2021-08-03

## 2021-11-16 NOTE — PROGRESS NOTES
Patient Name: Magali Song   Age: 62 y.o. Sex:  female  YOB: 1960   Medical Record Number: [id number]      Antimicrobial  Vancomycin   Indication sepsis   Dose / Interval           Current Antimicrobial Therapy (168h ago, onward)      Ordered     Start Stop    08/21/20 0451  vancomycin (VANCOCIN) 1,250 mg in 0.9% sodium chloride 250 mL (VIAL-MATE)  1,250 mg,   IntraVENous,   EVERY 12 HOURS      08/21/20 1600 --    08/21/20 0451  piperacillin-tazobactam (ZOSYN) 3.375 g in 0.9% sodium chloride (MBP/ADV) 100 mL MBP  3.375 g,   IntraVENous,   EVERY 6 HOURS      08/21/20 0500 08/28/20 0459    08/21/20 0451  vancomycin (VANCOCIN) 1,250 mg in 0.9% sodium chloride 250 mL (VIAL-MATE)  1,250 mg,   IntraVENous,   ONCE      08/21/20 0500 08/21/20 1659    08/21/20 0352  vancomycin (VANCOCIN) 2000 mg in  ml infusion  2,000 mg,   IntraVENous,   ONCE      08/21/20 0400 08/21/20 1559    08/20/20 1723  ceFAZolin (ANCEF) 2g IVPB in 50 mL D5W  2 g,   IntraVENous,   EVERY 6 HOURS      08/20/20 1723 08/21/20 1159               Last Level (if applicable) No results found for: DOSE, TMG, DTG  Vancomycin   No results found for: VANCP, VANCT, VANCR   Gentamicin   No results found for: GENP, GENT, GENR]  Tobramycin   No results found for: TOBP, TOBT, TOBR   Amikacin   No results found for: Delta Stable, AMIKT, DAMIKT, DAMIKR     Cultures (7 most recent)   Culture result:   Date Value Ref Range Status   08/17/2020 MRSA NOT PRESENT   Final   08/17/2020    Final        Screening of patient nares for MRSA is for surveillance purposes and, if positive, to facilitate isolation considerations in high risk settings. It is not intended for automatic decolonization interventions per se as regimens are not sufficiently effective to warrant routine use.    01/03/2018    Final    MRSA target DNA is not detected (presumptive not colonized with MRSA)   04/13/2011 NO GROWTH 6 DAYS  Final   04/13/2011 NO GROWTH 6 DAYS  Final Renal Overview (72 hr)         Recent Labs     20  0356   BUN 27*   CREA 1.53*       CrCl (Current): Estimated Creatinine Clearance: 50 mL/min (A) (based on SCr of 1.53 mg/dL (H)). Lactic Acid    (Sepsis Criteria: >2.0 mmol/L) Lab Results   Component Value Date/Time    Lactic acid 2.9 (HH) 2020 03:56 AM      Procalcitonin (0.10-0.49 NG/ML) No results found for: PCT   Hepatic Overview (72 hr) Recent Labs     20  0356   ALT 18   AP PENDING      Temp (24-hr Max) Temp (24hrs), Av.5 °F (36.9 °C), Min:97 °F (36.1 °C), Max:101.1 °F (38.4 °C)       Hematology Recent Labs     20  0356 20  1650   WBC 11.3  --    HGB 10.4* 11.4*   HCT 32.0* 35.7     --    GRANS 83*  --    ANEU 9.3*  --    LAC 2.9*  --         DOT  1     Notes  Estimated Pharmacokinetic Parameters (based on population kinetics)  Vd: 83 L (0.7 L/kg)   Alfonzo: 0.054 hr-1 (T1/2 = 12.8 hrs)     Dosing Recommendations   Vancomycin dose: 1250 mg IV Q12hrs (infused over 1 hr)   Estimated peak: 30 mcg/mL   Estimated trough: 17.5 mcg/mL   Estimated AUC:ELODIA: 559 mcg*hr/mL (assumed ELODIA 1 mcg/mL)     A/P:   1. Recommend vancomycin 1250 mg IV Q12hrs (11 mg/kg)   2. Consider a vancomycin trough level prior to the 4th dose. 3. Please monitor renal function (urine output, BUN/SCr). Dose adjustments may be necessary with a significant change in renal function.    4. Vancomycin 2 gm Loading dose to facilitate rapid attainment of targeted trough serum levels       MIGUEL Gutierrez [facility]  Clinical Pharmacist  [phone] No

## 2022-03-18 PROBLEM — M54.10 RADICULOPATHY OF LEG: Status: ACTIVE | Noted: 2020-08-19

## 2022-03-18 PROBLEM — M48.061 LUMBAR SPINAL STENOSIS: Status: ACTIVE | Noted: 2020-08-19

## 2022-03-18 PROBLEM — N39.0 UTI (URINARY TRACT INFECTION): Status: ACTIVE | Noted: 2020-08-21

## 2022-03-18 PROBLEM — M47.816 LUMBAR SPONDYLOSIS: Status: ACTIVE | Noted: 2020-08-19

## 2022-03-18 PROBLEM — Z99.81 HYPOXEMIA REQUIRING SUPPLEMENTAL OXYGEN: Status: ACTIVE | Noted: 2020-08-23

## 2022-03-18 PROBLEM — R09.02 HYPOXEMIA REQUIRING SUPPLEMENTAL OXYGEN: Status: ACTIVE | Noted: 2020-08-23

## 2022-03-18 PROBLEM — Z95.1 HX OF CABG: Status: ACTIVE | Noted: 2018-03-15

## 2022-03-19 PROBLEM — J44.9 COPD (CHRONIC OBSTRUCTIVE PULMONARY DISEASE) (HCC): Status: ACTIVE | Noted: 2020-08-23

## 2022-03-19 PROBLEM — I25.10 3-VESSEL CAD: Status: ACTIVE | Noted: 2018-03-15

## 2022-03-19 PROBLEM — R06.83 SNORING: Status: ACTIVE | Noted: 2020-08-24

## 2022-03-19 PROBLEM — I50.31 ACUTE DIASTOLIC CHF (CONGESTIVE HEART FAILURE) (HCC): Status: ACTIVE | Noted: 2020-08-23

## 2022-03-19 PROBLEM — F17.290 CIGAR SMOKER: Status: ACTIVE | Noted: 2020-08-23

## 2022-03-19 PROBLEM — A41.9 SEPSIS SECONDARY TO UTI (HCC): Status: ACTIVE | Noted: 2020-08-21

## 2022-03-19 PROBLEM — E66.01 MORBID OBESITY (HCC): Status: ACTIVE | Noted: 2018-03-15

## 2022-03-19 PROBLEM — E78.5 HYPERLIPIDEMIA: Status: ACTIVE | Noted: 2018-03-15

## 2022-03-19 PROBLEM — G47.33 OSA (OBSTRUCTIVE SLEEP APNEA): Status: ACTIVE | Noted: 2020-08-23

## 2022-03-19 PROBLEM — N17.9 AKI (ACUTE KIDNEY INJURY) (HCC): Status: ACTIVE | Noted: 2020-08-21

## 2022-03-19 PROBLEM — N39.0 SEPSIS SECONDARY TO UTI (HCC): Status: ACTIVE | Noted: 2020-08-21

## 2022-03-19 PROBLEM — R57.9 SHOCK (HCC): Status: ACTIVE | Noted: 2020-08-23

## 2022-03-19 PROBLEM — I10 ESSENTIAL HYPERTENSION: Status: ACTIVE | Noted: 2018-03-16

## 2022-03-20 PROBLEM — Z98.1 S/P LUMBAR FUSION: Status: ACTIVE | Noted: 2020-08-21

## 2023-03-06 NOTE — PROGRESS NOTES
Problem: Mobility Impaired (Adult and Pediatric)  Goal: *Acute Goals and Plan of Care (Insert Text)  Description: In 1-4 days pt will be able to perform:  STG  1. Bed mobility:  Demonstrate proper log-roll technique for safe initiation of rolling for OOB activities. 2.  Supine to sit to supine S with HR for meals. 3.  Sit to stand to sit S with RW/LSO in prep for ambulation. LT.  Gait:  Ambulate 150ft S with RW/LSO, for home/community mobility. 2.  Activity tolerance: Tolerate up in chair 30 minutes-1 hour for ADL's.  3.  Patient/Family Education:  Patient/family to be independent with HEP for follow-up care and safe discharge. 2020 1442 by Damir Jackson PTA  Outcome: Not Progressing Towards Goal     Pt refused PT due to:  [x]  Son in to visit  []  Eating  []  Pain  []  Pt lethargic  []  Off Unit  Will f/u later, as pt's schedule allows. Thank you.   Deanne Gallardo, PTA [de-identified] :  ECHO 01/31/23: LVEF 33%, mod-severely reduced LVSF, severely dilated LV , no significant valvular disease, no pericardial effusion.  \par \par  [de-identified] : R+LHC 2/2:  LHC: Mild diffuse non-obs CAD;  RHC: RA 10, PA 24/17, PCWP 11, CI1.8, CO 3.75.

## 2023-05-12 RX ORDER — FLUTICASONE PROPIONATE 50 MCG
2 SPRAY, SUSPENSION (ML) NASAL 2 TIMES DAILY
COMMUNITY

## 2023-05-12 RX ORDER — CHLORTHALIDONE 25 MG/1
25 TABLET ORAL DAILY
COMMUNITY

## 2023-05-12 RX ORDER — FUROSEMIDE 40 MG/1
TABLET ORAL EVERY OTHER DAY
COMMUNITY

## 2023-05-12 RX ORDER — ATORVASTATIN CALCIUM 20 MG/1
20 TABLET, FILM COATED ORAL NIGHTLY
COMMUNITY

## 2023-05-12 RX ORDER — CLOBETASOL PROPIONATE 0.5 MG/G
OINTMENT TOPICAL 2 TIMES DAILY PRN
COMMUNITY

## 2023-05-12 RX ORDER — LAMOTRIGINE 100 MG/1
TABLET ORAL 2 TIMES DAILY
COMMUNITY

## 2023-05-12 RX ORDER — ARIPIPRAZOLE 2 MG/1
2 TABLET ORAL NIGHTLY
COMMUNITY

## 2023-05-12 RX ORDER — CYCLOBENZAPRINE HCL 10 MG
TABLET ORAL 3 TIMES DAILY PRN
COMMUNITY
Start: 2020-08-25

## 2023-05-12 RX ORDER — ALBUTEROL SULFATE 2.5 MG/3ML
SOLUTION RESPIRATORY (INHALATION) EVERY 4 HOURS PRN
COMMUNITY

## 2023-05-12 RX ORDER — OMEPRAZOLE 20 MG/1
TABLET, DELAYED RELEASE ORAL 2 TIMES DAILY
COMMUNITY

## 2023-05-12 RX ORDER — LOSARTAN POTASSIUM 100 MG/1
100 TABLET ORAL DAILY
COMMUNITY

## 2023-05-12 RX ORDER — CLONAZEPAM 0.5 MG/1
TABLET ORAL 3 TIMES DAILY PRN
COMMUNITY

## 2023-05-12 RX ORDER — ALBUTEROL SULFATE 90 UG/1
AEROSOL, METERED RESPIRATORY (INHALATION) EVERY 4 HOURS PRN
COMMUNITY

## 2023-05-12 RX ORDER — METOPROLOL TARTRATE 50 MG/1
TABLET, FILM COATED ORAL 2 TIMES DAILY
COMMUNITY

## 2023-05-12 RX ORDER — PREGABALIN 300 MG/1
CAPSULE ORAL 2 TIMES DAILY
COMMUNITY

## 2023-05-12 RX ORDER — POTASSIUM CHLORIDE 750 MG/1
CAPSULE, EXTENDED RELEASE ORAL DAILY
COMMUNITY

## 2023-05-12 RX ORDER — CHLORZOXAZONE 500 MG/1
TABLET ORAL
COMMUNITY

## 2025-01-27 NOTE — PROGRESS NOTES
Patient placed on CPAP with 2L bleed and is doing well. I will continue to monitor. impaired balance/impaired postural control/decreased strength

## (undated) DEVICE — STERILE MEDICINE CUP 2 OZ KIT: Brand: CARDINAL HEALTH

## (undated) DEVICE — GARMENT,MEDLINE,DVT,INT,CALF,MED, GEN2: Brand: MEDLINE

## (undated) DEVICE — POWDER HEMOSTAT GEL 1GR --

## (undated) DEVICE — SYRINGE IRRIG 60ML SFT PLIABLE BLB EZ TO GRP 1 HND USE W/

## (undated) DEVICE — GOWN ISOLATN REG BLU POLY UNISX W/ THMB LOOP

## (undated) DEVICE — 5.0MM X-COARSE DIAMOND

## (undated) DEVICE — NEEDLE HYPO 18GA L1.5IN PNK POLYPR HUB S STL THN WALL FILL

## (undated) DEVICE — HANDPIECE SET WITH FAN SPRAY TIP: Brand: INTERPULSE

## (undated) DEVICE — SUTURE STRATAFIX SZ 3-0 L30CM NONABSORBABLE UD L19MM FS-2 SXMP2B408

## (undated) DEVICE — STERILE POLYISOPRENE POWDER-FREE SURGICAL GLOVES: Brand: PROTEXIS

## (undated) DEVICE — SYSTEM SKIN CLSR 22CM DERMBND PRINEO

## (undated) DEVICE — PREP SKN CHLRAPRP APL 26ML STR --

## (undated) DEVICE — SYR 3ML LL TIP 1/10ML GRAD --

## (undated) DEVICE — PACK PROCEDURE SURG LAMINECTOMY SPINE CUST

## (undated) DEVICE — PACKING 8004004 NEURAY 200PK 13X38MM: Brand: NEURAY ®

## (undated) DEVICE — SOL IRRIGATION INJ NACL 0.9% 500ML BTL

## (undated) DEVICE — SYR 5ML 1/5 GRAD LL NSAF LF --

## (undated) DEVICE — CORD BPLR L12FT DISP

## (undated) DEVICE — SYR LR LCK 1ML GRAD NSAF 30ML --

## (undated) DEVICE — 1010 S-DRAPE TOWEL DRAPE 10/BX: Brand: STERI-DRAPE™

## (undated) DEVICE — STERILE POLYISOPRENE POWDER-FREE SURGICAL GLOVES WITH EMOLLIENT COATING: Brand: PROTEXIS

## (undated) DEVICE — YANKAUER,FLEXIBLE HANDLE,REGLR CAPACITY: Brand: MEDLINE INDUSTRIES, INC.

## (undated) DEVICE — DECANTING SET

## (undated) DEVICE — Z DISCONTINUED USE (MFG CAT 7984-37) SOLUTION IV SODIUM CHL 0.9% 100 ML INJ

## (undated) DEVICE — SUTURE VCRL + SZ 2-0 L18IN ABSRB VLT CT-2 1/2 CIR TAPERCUT VCP726D

## (undated) DEVICE — TOTAL TRAY, DB, 100% SILI FOLEY, 16FR 10: Brand: MEDLINE

## (undated) DEVICE — GAUZE,SPONGE,8"X4",12PLY,XRAY,STRL,LF: Brand: MEDLINE

## (undated) DEVICE — BOWL ASSY BM210 DUAL BLADE DISPOSABLE: Brand: MIDAS REX™

## (undated) DEVICE — SOLUTION IRRIG 3000ML LAC R FLX CONT

## (undated) DEVICE — SUTURE VCRL + SZ 1 L18IN ABSRB UD L36MM CT-1 1/2 CIR VCP841D

## (undated) DEVICE — DERMABOND SKIN ADH 0.7ML --

## (undated) DEVICE — KIT EVAC 0.13IN RECT TB DIA10FR 400CC PVC 3 SPR Y CONN DRN

## (undated) DEVICE — CATH IV AUTOGRD ORN 14GA 45MM -- INSYTE-N

## (undated) DEVICE — MAJ-1414 SINGLE USE ADPATER BIOPSY VALV: Brand: SINGLE USE ADAPTOR BIOPSY VALVE

## (undated) DEVICE — 3.0MM PRECISION NEURO (MATCH HEAD)

## (undated) DEVICE — DRESSING FOAM SELF ADH 20X10 CM ABSORBENT MEPILEX BORDER

## (undated) DEVICE — STRAP,POSITIONING,KNEE/BODY,FOAM,4X60": Brand: MEDLINE

## (undated) DEVICE — JACKSON TABLE POSITIONER KIT: Brand: MEDLINE INDUSTRIES, INC.